# Patient Record
Sex: MALE | Race: WHITE | NOT HISPANIC OR LATINO | Employment: UNEMPLOYED | URBAN - METROPOLITAN AREA
[De-identification: names, ages, dates, MRNs, and addresses within clinical notes are randomized per-mention and may not be internally consistent; named-entity substitution may affect disease eponyms.]

---

## 2021-03-24 ENCOUNTER — OFFICE VISIT (OUTPATIENT)
Dept: URGENT CARE | Facility: CLINIC | Age: 46
End: 2021-03-24
Payer: MEDICAID

## 2021-03-24 VITALS
BODY MASS INDEX: 31.83 KG/M2 | OXYGEN SATURATION: 96 % | RESPIRATION RATE: 16 BRPM | SYSTOLIC BLOOD PRESSURE: 125 MMHG | HEART RATE: 106 BPM | DIASTOLIC BLOOD PRESSURE: 95 MMHG | HEIGHT: 72 IN | WEIGHT: 235 LBS | TEMPERATURE: 98 F

## 2021-03-24 DIAGNOSIS — M25.571 ACUTE RIGHT ANKLE PAIN: Primary | ICD-10-CM

## 2021-03-24 DIAGNOSIS — R09.81 SINUS CONGESTION: ICD-10-CM

## 2021-03-24 PROCEDURE — 99204 OFFICE O/P NEW MOD 45 MIN: CPT | Mod: S$GLB,,, | Performed by: FAMILY MEDICINE

## 2021-03-24 PROCEDURE — 73610 X-RAY EXAM OF ANKLE: CPT | Mod: RT,S$GLB,, | Performed by: RADIOLOGY

## 2021-03-24 PROCEDURE — 73610 XR ANKLE COMPLETE 3 VIEW RIGHT: ICD-10-PCS | Mod: RT,S$GLB,, | Performed by: RADIOLOGY

## 2021-03-24 PROCEDURE — 99204 PR OFFICE/OUTPT VISIT, NEW, LEVL IV, 45-59 MIN: ICD-10-PCS | Mod: S$GLB,,, | Performed by: FAMILY MEDICINE

## 2021-03-24 RX ORDER — PREGABALIN 100 MG/1
100 CAPSULE ORAL 3 TIMES DAILY
Status: ON HOLD | COMMUNITY
End: 2022-10-31 | Stop reason: HOSPADM

## 2021-03-24 RX ORDER — KETOROLAC TROMETHAMINE 30 MG/ML
30 INJECTION, SOLUTION INTRAMUSCULAR; INTRAVENOUS
Status: COMPLETED | OUTPATIENT
Start: 2021-03-24 | End: 2021-03-24

## 2021-03-24 RX ORDER — DEXAMETHASONE SODIUM PHOSPHATE 100 MG/10ML
10 INJECTION INTRAMUSCULAR; INTRAVENOUS
Status: COMPLETED | OUTPATIENT
Start: 2021-03-24 | End: 2021-03-24

## 2021-03-24 RX ORDER — FLUTICASONE PROPIONATE 50 MCG
1 SPRAY, SUSPENSION (ML) NASAL 2 TIMES DAILY
Qty: 16 G | Refills: 1 | Status: SHIPPED | OUTPATIENT
Start: 2021-03-24

## 2021-03-24 RX ADMIN — KETOROLAC TROMETHAMINE 30 MG: 30 INJECTION, SOLUTION INTRAMUSCULAR; INTRAVENOUS at 01:03

## 2021-03-24 RX ADMIN — DEXAMETHASONE SODIUM PHOSPHATE 10 MG: 100 INJECTION INTRAMUSCULAR; INTRAVENOUS at 01:03

## 2022-10-15 PROBLEM — D64.9 SYMPTOMATIC ANEMIA: Status: ACTIVE | Noted: 2022-10-15

## 2022-10-15 PROBLEM — E11.9 TYPE 2 DIABETES MELLITUS, WITHOUT LONG-TERM CURRENT USE OF INSULIN: Status: ACTIVE | Noted: 2022-10-15

## 2022-10-15 PROBLEM — K70.30 ALCOHOLIC CIRRHOSIS: Status: ACTIVE | Noted: 2022-10-15

## 2022-10-15 PROBLEM — D69.6 THROMBOCYTOPENIA: Status: ACTIVE | Noted: 2022-10-15

## 2022-10-15 PROBLEM — I10 BENIGN ESSENTIAL HTN: Status: ACTIVE | Noted: 2022-10-15

## 2022-10-16 PROBLEM — E11.9 TYPE 2 DIABETES MELLITUS, WITHOUT LONG-TERM CURRENT USE OF INSULIN: Status: RESOLVED | Noted: 2022-10-15 | Resolved: 2022-10-16

## 2022-10-16 PROBLEM — R53.81 DEBILITY: Status: ACTIVE | Noted: 2022-10-16

## 2022-10-18 PROBLEM — L02.413 ABSCESS OF RIGHT ARM: Status: ACTIVE | Noted: 2022-10-18

## 2022-10-18 PROBLEM — E87.1 HYPONATREMIA: Status: ACTIVE | Noted: 2022-10-18

## 2022-10-20 PROBLEM — N17.9 ARF (ACUTE RENAL FAILURE): Status: ACTIVE | Noted: 2022-10-20

## 2022-10-22 ENCOUNTER — HOSPITAL ENCOUNTER (INPATIENT)
Facility: HOSPITAL | Age: 47
LOS: 10 days | Discharge: HOME | DRG: 432 | End: 2022-11-01
Attending: HOSPITALIST | Admitting: HOSPITALIST
Payer: MEDICAID

## 2022-10-22 DIAGNOSIS — F10.21 ALCOHOL DEPENDENCE IN REMISSION: Chronic | ICD-10-CM

## 2022-10-22 DIAGNOSIS — K74.60 DECOMPENSATED HEPATIC CIRRHOSIS: Primary | ICD-10-CM

## 2022-10-22 DIAGNOSIS — G60.9 IDIOPATHIC PERIPHERAL NEUROPATHY: ICD-10-CM

## 2022-10-22 DIAGNOSIS — K72.90 DECOMPENSATED HEPATIC CIRRHOSIS: Primary | ICD-10-CM

## 2022-10-22 DIAGNOSIS — Z01.818 PRE-TRANSPLANT EVALUATION FOR LIVER TRANSPLANT: ICD-10-CM

## 2022-10-22 DIAGNOSIS — I27.20 PULMONARY HYPERTENSION: ICD-10-CM

## 2022-10-22 PROBLEM — G61.0 GUILLAIN-BARRE SYNDROME: Status: ACTIVE | Noted: 2020-09-08

## 2022-10-22 PROBLEM — I87.2 PERIPHERAL VENOUS INSUFFICIENCY: Status: ACTIVE | Noted: 2021-06-21

## 2022-10-22 PROBLEM — E87.5 HYPERKALEMIA: Status: ACTIVE | Noted: 2022-10-22

## 2022-10-22 PROBLEM — M79.2 PERIPHERAL NEUROPATHIC PAIN: Status: ACTIVE | Noted: 2019-10-28

## 2022-10-22 PROBLEM — F10.20 UNCOMPLICATED ALCOHOL DEPENDENCE: Status: ACTIVE | Noted: 2019-10-28

## 2022-10-22 PROBLEM — I82.812 THROMBOSIS OF LEFT SAPHENOUS VEIN: Status: ACTIVE | Noted: 2019-11-26

## 2022-10-22 PROBLEM — I95.1 ORTHOSTATIC HYPOTENSION: Status: ACTIVE | Noted: 2021-10-20

## 2022-10-22 PROBLEM — E53.8 VITAMIN B12 DEFICIENCY (NON ANEMIC): Status: ACTIVE | Noted: 2020-04-30

## 2022-10-22 PROBLEM — R00.0 TACHYCARDIA: Status: ACTIVE | Noted: 2021-05-03

## 2022-10-22 PROCEDURE — 84540 ASSAY OF URINE/UREA-N: CPT | Performed by: HOSPITALIST

## 2022-10-22 PROCEDURE — 83935 ASSAY OF URINE OSMOLALITY: CPT | Performed by: HOSPITALIST

## 2022-10-22 PROCEDURE — 84300 ASSAY OF URINE SODIUM: CPT | Performed by: HOSPITALIST

## 2022-10-22 PROCEDURE — 81001 URINALYSIS AUTO W/SCOPE: CPT | Performed by: HOSPITALIST

## 2022-10-22 PROCEDURE — 99223 PR INITIAL HOSPITAL CARE,LEVL III: ICD-10-PCS | Mod: ,,, | Performed by: HOSPITALIST

## 2022-10-22 PROCEDURE — 84156 ASSAY OF PROTEIN URINE: CPT | Performed by: HOSPITALIST

## 2022-10-22 PROCEDURE — 87086 URINE CULTURE/COLONY COUNT: CPT | Performed by: HOSPITALIST

## 2022-10-22 PROCEDURE — 84133 ASSAY OF URINE POTASSIUM: CPT | Performed by: HOSPITALIST

## 2022-10-22 PROCEDURE — 87205 SMEAR GRAM STAIN: CPT | Performed by: HOSPITALIST

## 2022-10-22 PROCEDURE — 20600001 HC STEP DOWN PRIVATE ROOM

## 2022-10-22 PROCEDURE — 25000003 PHARM REV CODE 250: Performed by: HOSPITALIST

## 2022-10-22 PROCEDURE — 82436 ASSAY OF URINE CHLORIDE: CPT | Performed by: HOSPITALIST

## 2022-10-22 PROCEDURE — 99223 1ST HOSP IP/OBS HIGH 75: CPT | Mod: ,,, | Performed by: HOSPITALIST

## 2022-10-22 RX ORDER — MICONAZOLE NITRATE 2 %
POWDER (GRAM) TOPICAL 2 TIMES DAILY
Status: DISCONTINUED | OUTPATIENT
Start: 2022-10-22 | End: 2022-11-01 | Stop reason: HOSPADM

## 2022-10-22 RX ORDER — LACTULOSE 10 G/15ML
10 SOLUTION ORAL 2 TIMES DAILY
Status: DISCONTINUED | OUTPATIENT
Start: 2022-10-22 | End: 2022-11-01

## 2022-10-22 RX ORDER — ONDANSETRON 2 MG/ML
4 INJECTION INTRAMUSCULAR; INTRAVENOUS EVERY 6 HOURS PRN
Status: DISCONTINUED | OUTPATIENT
Start: 2022-10-22 | End: 2022-11-01 | Stop reason: HOSPADM

## 2022-10-22 RX ORDER — MIDODRINE HYDROCHLORIDE 5 MG/1
10 TABLET ORAL EVERY 8 HOURS
Status: DISCONTINUED | OUTPATIENT
Start: 2022-10-22 | End: 2022-10-28

## 2022-10-22 RX ORDER — SODIUM BICARBONATE 650 MG/1
1300 TABLET ORAL 3 TIMES DAILY
Status: DISCONTINUED | OUTPATIENT
Start: 2022-10-22 | End: 2022-11-01 | Stop reason: HOSPADM

## 2022-10-22 RX ORDER — ACETAMINOPHEN 325 MG/1
650 TABLET ORAL EVERY 8 HOURS PRN
Status: DISCONTINUED | OUTPATIENT
Start: 2022-10-22 | End: 2022-11-01 | Stop reason: HOSPADM

## 2022-10-22 RX ORDER — FOLIC ACID 1 MG/1
1000 TABLET ORAL DAILY
Status: DISCONTINUED | OUTPATIENT
Start: 2022-10-23 | End: 2022-11-01 | Stop reason: HOSPADM

## 2022-10-22 RX ADMIN — SODIUM BICARBONATE 650 MG TABLET 1300 MG: at 09:10

## 2022-10-22 RX ADMIN — MIDODRINE HYDROCHLORIDE 10 MG: 5 TABLET ORAL at 09:10

## 2022-10-22 RX ADMIN — LACTULOSE 10 G: 20 SOLUTION ORAL at 09:10

## 2022-10-22 RX ADMIN — MICONAZOLE NITRATE 2 % TOPICAL POWDER: at 10:10

## 2022-10-22 NOTE — PROVIDER TRANSFER
Outside Transfer Acceptance Note / Regional Referral Center    Referring facility: [facility]   Referring provider: GAIL DOUGLAS  Accepting facility: [facility]  Accepting provider: BETH ALAS  Reason for transfer: Higher Level of Care   Transfer diagnosis: hepatorenal syndrome  Transfer specialty requested: Transplant Liver  Transfer specialty notified: yes  Transfer level: NUMBER 1-5: 2  Isolation status: No active isolations   Admission class or status: IP- Inpatient    Narrative     47-year-old m with PMH HTN, GBS, diabetes, presumed alcoholic cirrhosis admitted to [facility] on 10/15 with weakness and abd distension.  Patient reports that his last drink was 2 months ago.    On admission VS unremarkable.  PEx pos for scleral icterus and abd distension with no tenderness.  He was AOX3.  WBC 12.9, Hb 7.2, Plts 142, INR 1.6 > 1. Na 132, K 3.7, CO2 21, BUN 50, Cr 1.41 (BL 0.91), bilirubin 8.8, AST 68, ALT 37, HV A/B/C neg.  COVID neg.      CT abd pos for cirrhosis with moderate volume ascites.  Also, 1.4 x 1 cm nonspecific hypoattenuating lesion in near the splenic hilum.  US abdomen pos for cirrhotic liver.  Stasis flow suggestive of portal HTN.  Moderate ascites.      On 10/18 the patient was noted to have a right axillary abscess a/w rising WBC.  The abscess was drained and patient placed on clindamycin > linezolid and CTX.  Cultures NGTD.    Hospitalization a/w increasing renal failure with  Cr 1.41 (10/15) > 1.66 (10/19) > 2 (10/21) > 3.8 (10/22) following a large volume paracentesis on 10/21.  BP fell into the mid 90s briefly following the procedure. Patient was given albumin (25 g x 4 ) and placed on midodrine    Also hosppitalization a/w increasing liver failure: T bili 8.8 > > 10.2, AST 68 > > 88. MELD Na 35    Transfer requested for liver XPL evaluation.  Transfer diagnosis acute liver failure, acute renal failure with concern for hepatorenal syndrome.     Instructions       [facility] James-  Admit to Hospital Medicine  Upon patient arrival to floor, please send SecureChat to [facility] or call extension [phone] (if no answer, this will flip to a beeper, so enter your call back number) for Hospital Medicine admit team assignment and for additional admit orders for the patient.  Do not page the attending physician associated with the patient on arrival (this physician may not be on duty at the time of arrival).  Rather, always call [phone] to reach the triage physician for orders and team assignment.

## 2022-10-23 PROBLEM — K74.60 DECOMPENSATED HEPATIC CIRRHOSIS: Status: ACTIVE | Noted: 2022-10-23

## 2022-10-23 PROBLEM — F10.21 ALCOHOL DEPENDENCE IN REMISSION: Chronic | Status: ACTIVE | Noted: 2022-10-23

## 2022-10-23 PROBLEM — K72.90 DECOMPENSATED HEPATIC CIRRHOSIS: Status: ACTIVE | Noted: 2022-10-23

## 2022-10-23 PROBLEM — F10.10 ALCOHOL ABUSE: Status: ACTIVE | Noted: 2022-10-23

## 2022-10-23 LAB
AFP SERPL-MCNC: <2 NG/ML (ref 0–8.4)
ALBUMIN SERPL BCP-MCNC: 2.6 G/DL (ref 3.5–5.2)
ALP SERPL-CCNC: 171 U/L (ref 55–135)
ALT SERPL W/O P-5'-P-CCNC: 25 U/L (ref 10–44)
AMMONIA PLAS-SCNC: 54 UMOL/L (ref 10–50)
ANION GAP SERPL CALC-SCNC: 9 MMOL/L (ref 8–16)
AST SERPL-CCNC: 54 U/L (ref 10–40)
BASOPHILS # BLD AUTO: 0.1 K/UL (ref 0–0.2)
BASOPHILS NFR BLD: 0.7 % (ref 0–1.9)
BILIRUB SERPL-MCNC: 9.7 MG/DL (ref 0.1–1)
BILIRUB UR QL STRIP: ABNORMAL
BUN SERPL-MCNC: 53 MG/DL (ref 6–20)
CALCIUM SERPL-MCNC: 7.9 MG/DL (ref 8.7–10.5)
CERULOPLASMIN SERPL-MCNC: 14 MG/DL (ref 15–45)
CHLORIDE SERPL-SCNC: 99 MMOL/L (ref 95–110)
CHLORIDE UR-SCNC: <20 MMOL/L (ref 25–200)
CK SERPL-CCNC: 13 U/L (ref 20–200)
CLARITY UR REFRACT.AUTO: ABNORMAL
CO2 SERPL-SCNC: 20 MMOL/L (ref 23–29)
COLOR UR AUTO: ABNORMAL
CREAT SERPL-MCNC: 2.4 MG/DL (ref 0.5–1.4)
CREAT UR-MCNC: 163 MG/DL (ref 23–375)
CREAT UR-MCNC: 163 MG/DL (ref 23–375)
DIFFERENTIAL METHOD: ABNORMAL
EOSINOPHIL # BLD AUTO: 0.5 K/UL (ref 0–0.5)
EOSINOPHIL NFR BLD: 3.1 % (ref 0–8)
ERYTHROCYTE [DISTWIDTH] IN BLOOD BY AUTOMATED COUNT: 20.4 % (ref 11.5–14.5)
EST. GFR  (NO RACE VARIABLE): 32.3 ML/MIN/1.73 M^2
FERRITIN SERPL-MCNC: 1292 NG/ML (ref 20–300)
GLUCOSE SERPL-MCNC: 125 MG/DL (ref 70–110)
GLUCOSE UR QL STRIP: NEGATIVE
GRAM STN SPEC: NORMAL
GRAM STN SPEC: NORMAL
HCT VFR BLD AUTO: 24.8 % (ref 40–54)
HGB BLD-MCNC: 8.3 G/DL (ref 14–18)
HGB UR QL STRIP: ABNORMAL
HYALINE CASTS UR QL AUTO: 11 /LPF
IGG SERPL-MCNC: 1465 MG/DL (ref 650–1600)
IMM GRANULOCYTES # BLD AUTO: 0.07 K/UL (ref 0–0.04)
IMM GRANULOCYTES NFR BLD AUTO: 0.5 % (ref 0–0.5)
INR PPP: 1.7 (ref 0.8–1.2)
KETONES UR QL STRIP: NEGATIVE
LEUKOCYTE ESTERASE UR QL STRIP: ABNORMAL
LYMPHOCYTES # BLD AUTO: 1.7 K/UL (ref 1–4.8)
LYMPHOCYTES NFR BLD: 11.4 % (ref 18–48)
MAGNESIUM SERPL-MCNC: 1.7 MG/DL (ref 1.6–2.6)
MCH RBC QN AUTO: 35.2 PG (ref 27–31)
MCHC RBC AUTO-ENTMCNC: 33.5 G/DL (ref 32–36)
MCV RBC AUTO: 105 FL (ref 82–98)
MICROSCOPIC COMMENT: ABNORMAL
MONOCYTES # BLD AUTO: 1.5 K/UL (ref 0.3–1)
MONOCYTES NFR BLD: 10 % (ref 4–15)
NEUTROPHILS # BLD AUTO: 11 K/UL (ref 1.8–7.7)
NEUTROPHILS NFR BLD: 74.3 % (ref 38–73)
NITRITE UR QL STRIP: NEGATIVE
NRBC BLD-RTO: 0 /100 WBC
OSMOLALITY UR: 374 MOSM/KG (ref 50–1200)
PH UR STRIP: 5 [PH] (ref 5–8)
PHOSPHATE SERPL-MCNC: 4.2 MG/DL (ref 2.7–4.5)
PLATELET # BLD AUTO: 114 K/UL (ref 150–450)
PMV BLD AUTO: 13.4 FL (ref 9.2–12.9)
POTASSIUM SERPL-SCNC: 4.3 MMOL/L (ref 3.5–5.1)
POTASSIUM UR-SCNC: 48 MMOL/L (ref 15–95)
PROT SERPL-MCNC: 5.4 G/DL (ref 6–8.4)
PROT UR QL STRIP: NEGATIVE
PROT UR-MCNC: 22 MG/DL (ref 0–15)
PROT/CREAT UR: 0.13 MG/G{CREAT} (ref 0–0.2)
PROTHROMBIN TIME: 17.2 SEC (ref 9–12.5)
RBC # BLD AUTO: 2.36 M/UL (ref 4.6–6.2)
RBC #/AREA URNS AUTO: 15 /HPF (ref 0–4)
SODIUM SERPL-SCNC: 128 MMOL/L (ref 136–145)
SODIUM UR-SCNC: <10 MMOL/L (ref 20–250)
SP GR UR STRIP: 1.02 (ref 1–1.03)
SQUAMOUS #/AREA URNS AUTO: 1 /HPF
URATE SERPL-MCNC: 12.7 MG/DL (ref 3.4–7)
URN SPEC COLLECT METH UR: ABNORMAL
UUN UR-MCNC: 347 MG/DL (ref 140–1050)
VANCOMYCIN SERPL-MCNC: 18.5 UG/ML
WBC # BLD AUTO: 14.85 K/UL (ref 3.9–12.7)
WBC #/AREA URNS AUTO: 48 /HPF (ref 0–5)

## 2022-10-23 PROCEDURE — 86256 FLUORESCENT ANTIBODY TITER: CPT | Performed by: HOSPITALIST

## 2022-10-23 PROCEDURE — 86376 MICROSOMAL ANTIBODY EACH: CPT | Performed by: HOSPITALIST

## 2022-10-23 PROCEDURE — 82784 ASSAY IGA/IGD/IGG/IGM EACH: CPT | Performed by: HOSPITALIST

## 2022-10-23 PROCEDURE — 82105 ALPHA-FETOPROTEIN SERUM: CPT | Performed by: HOSPITALIST

## 2022-10-23 PROCEDURE — 82728 ASSAY OF FERRITIN: CPT | Performed by: HOSPITALIST

## 2022-10-23 PROCEDURE — 82140 ASSAY OF AMMONIA: CPT | Performed by: HOSPITALIST

## 2022-10-23 PROCEDURE — 80321 ALCOHOLS BIOMARKERS 1OR 2: CPT | Performed by: HOSPITALIST

## 2022-10-23 PROCEDURE — 80053 COMPREHEN METABOLIC PANEL: CPT | Performed by: HOSPITALIST

## 2022-10-23 PROCEDURE — 84550 ASSAY OF BLOOD/URIC ACID: CPT | Performed by: HOSPITALIST

## 2022-10-23 PROCEDURE — 82550 ASSAY OF CK (CPK): CPT | Performed by: HOSPITALIST

## 2022-10-23 PROCEDURE — 82542 COL CHROMOTOGRAPHY QUAL/QUAN: CPT | Performed by: HOSPITALIST

## 2022-10-23 PROCEDURE — 83735 ASSAY OF MAGNESIUM: CPT | Performed by: HOSPITALIST

## 2022-10-23 PROCEDURE — 99223 1ST HOSP IP/OBS HIGH 75: CPT | Mod: ,,, | Performed by: INTERNAL MEDICINE

## 2022-10-23 PROCEDURE — 84100 ASSAY OF PHOSPHORUS: CPT | Performed by: HOSPITALIST

## 2022-10-23 PROCEDURE — 63600175 PHARM REV CODE 636 W HCPCS: Mod: JB | Performed by: HOSPITALIST

## 2022-10-23 PROCEDURE — 36415 COLL VENOUS BLD VENIPUNCTURE: CPT | Performed by: HOSPITALIST

## 2022-10-23 PROCEDURE — 63600175 PHARM REV CODE 636 W HCPCS: Mod: JG | Performed by: HOSPITALIST

## 2022-10-23 PROCEDURE — 25000003 PHARM REV CODE 250: Performed by: HOSPITALIST

## 2022-10-23 PROCEDURE — 85610 PROTHROMBIN TIME: CPT | Performed by: HOSPITALIST

## 2022-10-23 PROCEDURE — 99223 PR INITIAL HOSPITAL CARE,LEVL III: ICD-10-PCS | Mod: ,,, | Performed by: INTERNAL MEDICINE

## 2022-10-23 PROCEDURE — 99222 PR INITIAL HOSPITAL CARE,LEVL II: ICD-10-PCS | Mod: ,,, | Performed by: INTERNAL MEDICINE

## 2022-10-23 PROCEDURE — 85025 COMPLETE CBC W/AUTO DIFF WBC: CPT | Performed by: HOSPITALIST

## 2022-10-23 PROCEDURE — 80202 ASSAY OF VANCOMYCIN: CPT | Performed by: HOSPITALIST

## 2022-10-23 PROCEDURE — 82390 ASSAY OF CERULOPLASMIN: CPT | Performed by: HOSPITALIST

## 2022-10-23 PROCEDURE — 86665 EPSTEIN-BARR CAPSID VCA: CPT | Mod: 59 | Performed by: HOSPITALIST

## 2022-10-23 PROCEDURE — 20600001 HC STEP DOWN PRIVATE ROOM

## 2022-10-23 PROCEDURE — 99222 1ST HOSP IP/OBS MODERATE 55: CPT | Mod: ,,, | Performed by: INTERNAL MEDICINE

## 2022-10-23 PROCEDURE — 82525 ASSAY OF COPPER: CPT | Performed by: HOSPITALIST

## 2022-10-23 PROCEDURE — P9047 ALBUMIN (HUMAN), 25%, 50ML: HCPCS | Mod: JG | Performed by: HOSPITALIST

## 2022-10-23 PROCEDURE — 86038 ANTINUCLEAR ANTIBODIES: CPT | Performed by: HOSPITALIST

## 2022-10-23 RX ORDER — PROCHLORPERAZINE EDISYLATE 5 MG/ML
5 INJECTION INTRAMUSCULAR; INTRAVENOUS EVERY 6 HOURS PRN
Status: DISCONTINUED | OUTPATIENT
Start: 2022-10-23 | End: 2022-11-01 | Stop reason: HOSPADM

## 2022-10-23 RX ORDER — ALBUMIN HUMAN 250 G/1000ML
50 SOLUTION INTRAVENOUS 2 TIMES DAILY
Status: DISPENSED | OUTPATIENT
Start: 2022-10-23 | End: 2022-10-24

## 2022-10-23 RX ORDER — THIAMINE HCL 100 MG
100 TABLET ORAL DAILY
Status: DISCONTINUED | OUTPATIENT
Start: 2022-10-23 | End: 2022-11-01 | Stop reason: HOSPADM

## 2022-10-23 RX ORDER — PREGABALIN 75 MG/1
75 CAPSULE ORAL 2 TIMES DAILY
Status: DISCONTINUED | OUTPATIENT
Start: 2022-10-23 | End: 2022-10-31

## 2022-10-23 RX ORDER — OCTREOTIDE ACETATE 50 UG/ML
100 INJECTION, SOLUTION INTRAVENOUS; SUBCUTANEOUS EVERY 8 HOURS
Status: DISCONTINUED | OUTPATIENT
Start: 2022-10-23 | End: 2022-10-26

## 2022-10-23 RX ADMIN — MIDODRINE HYDROCHLORIDE 10 MG: 5 TABLET ORAL at 05:10

## 2022-10-23 RX ADMIN — ALBUMIN (HUMAN) 50 G: 12.5 SOLUTION INTRAVENOUS at 08:10

## 2022-10-23 RX ADMIN — OCTREOTIDE ACETATE 100 MCG: 50 INJECTION, SOLUTION INTRAVENOUS; SUBCUTANEOUS at 08:10

## 2022-10-23 RX ADMIN — SODIUM BICARBONATE 650 MG TABLET 1300 MG: at 08:10

## 2022-10-23 RX ADMIN — SODIUM BICARBONATE 650 MG TABLET 1300 MG: at 02:10

## 2022-10-23 RX ADMIN — PREGABALIN 75 MG: 75 CAPSULE ORAL at 12:10

## 2022-10-23 RX ADMIN — LACTULOSE 10 G: 20 SOLUTION ORAL at 08:10

## 2022-10-23 RX ADMIN — CEFTRIAXONE SODIUM 2 G: 2 INJECTION, SOLUTION INTRAVENOUS at 01:10

## 2022-10-23 RX ADMIN — MICONAZOLE NITRATE 2 % TOPICAL POWDER: at 08:10

## 2022-10-23 RX ADMIN — Medication 100 MG: at 08:10

## 2022-10-23 RX ADMIN — PREGABALIN 75 MG: 75 CAPSULE ORAL at 08:10

## 2022-10-23 RX ADMIN — VANCOMYCIN HYDROCHLORIDE 2000 MG: 500 INJECTION, POWDER, LYOPHILIZED, FOR SOLUTION INTRAVENOUS at 02:10

## 2022-10-23 RX ADMIN — ALBUMIN (HUMAN) 50 G: 12.5 SOLUTION INTRAVENOUS at 01:10

## 2022-10-23 RX ADMIN — OCTREOTIDE ACETATE 100 MCG: 50 INJECTION, SOLUTION INTRAVENOUS; SUBCUTANEOUS at 02:10

## 2022-10-23 RX ADMIN — Medication 1 CAPSULE: at 08:10

## 2022-10-23 RX ADMIN — MIDODRINE HYDROCHLORIDE 10 MG: 5 TABLET ORAL at 02:10

## 2022-10-23 RX ADMIN — MIDODRINE HYDROCHLORIDE 10 MG: 5 TABLET ORAL at 08:10

## 2022-10-23 RX ADMIN — PHYTONADIONE 5 MG: 10 INJECTION, EMULSION INTRAMUSCULAR; INTRAVENOUS; SUBCUTANEOUS at 11:10

## 2022-10-23 RX ADMIN — FOLIC ACID 1000 MCG: 1 TABLET ORAL at 08:10

## 2022-10-23 NOTE — NURSING
Pt to US. Pt transported per stretcher. Pt refused to assist at all in transfer from bed to stretcher. Nurse, transport and PCT required to move patient.

## 2022-10-23 NOTE — H&P
[facility] Medicine  History & Physical    Patient Name: Marshall Corona  MRN: [id number]  Patient Class: IP- Inpatient  Admission Date: 10/22/2022  Attending Physician: Sandra Neal MD [facility]  Admitting Physician: Jose Quijano MD  Primary Care Provider: Primary Doctor No         Patient information was obtained from patient, past medical records and [facility] notes .     Subjective:     Principal Problem:KATHE (acute kidney injury)    Chief Complaint:   Chief Complaint   Patient presents with    decompensated cirrhosis kathe        HPI: TRANSFER CENTER  PHYSICIAN SUMMARY  47-year-old m with PMH HTN, GBS, diabetes, presumed alcoholic cirrhosis admitted to [facility] on 10/15 with weakness and abd distension.  Patient reports that his last drink was 2 months ago.     On admission VS unremarkable.  PEx pos for scleral icterus and abd distension with no tenderness.  He was AOX3.  WBC 12.9, Hb 7.2, Plts 142, INR 1.6 > 1. Na 132, K 3.7, CO2 21, BUN 50, Cr 1.41 (BL 0.91), bilirubin 8.8, AST 68, ALT 37, HV A/B/C neg.  COVID neg.       CT abd pos for cirrhosis with moderate volume ascites.  Also, 1.4 x 1 cm nonspecific hypoattenuating lesion in near the splenic hilum.  US abdomen pos for cirrhotic liver.  Stasis flow suggestive of portal HTN.  Moderate ascites.       On 10/18 the patient was noted to have a right axillary abscess a/w rising WBC.  The abscess was drained and patient placed on clindamycin > linezolid and CTX.  Cultures NGTD.     Hospitalization a/w increasing renal failure with  Cr 1.41 (10/15) > 1.66 (10/19) > 2 (10/21) > 3.8 (10/22) following a large volume paracentesis on 10/21.  BP fell into the mid 90s briefly following the procedure. Patient was given albumin (25 g x 4 ) and placed on midodrine     Also hosppitalization a/w increasing liver failure: T bili 8.8 > > 10.2, AST 68 > > 88. MELD Na 35     Transfer requested for liver XPL evaluation.   "Transfer diagnosis acute liver failure, acute renal failure with concern for hepatorenal syndrome.     BEDSIDE EVAL    At bedside patient reports he feels ok.  Before paracentesis on 10/21, he's had one prior at a another hospital earlier this month where 4L was removed.  He feels after recent paracentesis he has increased abdominal swelling and pain, feels fluid has reaccumulated faster or he is more bloated.  Reports last alcohol use was a few months ago, states he quit prior to diagnosis of liver disease.     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   \  He uses smokeless tobacco Gallion Dip.  He denies any hx of Intra-nasal or injection illicit drug use.       Past Medical History:   Diagnosis Date    Alcohol abuse 10/23/2022    Allergy     Guillain-[place] syndrome     Hypertension     Peripheral venous insufficiency 6/21/2021    Pulmonary hypertension 8/3/2021    Thrombosis of left saphenous vein 11/26/2019    Uncomplicated alcohol dependence 10/28/2019    Vitamin B12 deficiency (non anemic) 4/30/2020       Past Surgical History:   Procedure Laterality Date    ARTHROSCOPY OF KNEE  2001    Right knee scope    EYE SURGERY      bilateral lasic    INCISION AND DRAINAGE OF ABSCESS Right 10/18/2022    Procedure: INCISION AND DRAINAGE, ABSCESS-AXILLA;  Surgeon: Geovanni Lee MD;  Location: [facility];  Service: General;  Laterality: Right;       Review of patient's allergies indicates:   Allergen Reactions    Codeine Itching, Anaphylaxis and Hives    Hydrocodone-acetaminophen      "Agitation"    Influenza virus vaccines Other (See Comments)    Oxycodone-acetaminophen      "Agitation"       Current Facility-Administered Medications on File Prior to " Encounter   Medication    [DISCONTINUED] 0.9%  NaCl infusion (for blood administration)    [DISCONTINUED] acetaminophen tablet 650 mg    [DISCONTINUED] acetaminophen tablet 650 mg    [DISCONTINUED] cefTRIAXone (ROCEPHIN) 2 g/50 mL D5W IVPB    [DISCONTINUED] dextrose 50% injection 12.5 g    [DISCONTINUED] dextrose 50% injection 25 g    [DISCONTINUED] dextrose oral liquid/gel 15 g    [DISCONTINUED] dextrose oral liquid/gel 15 g    [DISCONTINUED] dextrose oral liquid/gel 30 g    [DISCONTINUED] dextrose oral liquid/gel 30 g    [DISCONTINUED] folic acid tablet 1,000 mcg    [DISCONTINUED] glucagon (human recombinant) injection 1 mg    [DISCONTINUED] Lactobacillus rhamnosus GG capsule 1 capsule    [DISCONTINUED] lactulose 20 gram/30 mL solution Soln 10 g    [DISCONTINUED] linezolid 600 mg/300 mL IVPB 600 mg    [DISCONTINUED] miconazole 2 % ointment    [DISCONTINUED] miconazole NITRATE 2 % top powder    [DISCONTINUED] midodrine tablet 10 mg    [DISCONTINUED] morphine injection 2 mg    [DISCONTINUED] naloxone 0.4 mg/mL injection 0.02 mg    [DISCONTINUED] ondansetron injection 4 mg    [DISCONTINUED] pantoprazole injection 40 mg    [DISCONTINUED] sodium bicarbonate tablet 1,300 mg    [DISCONTINUED] sodium bicarbonate tablet 650 mg    [DISCONTINUED] sodium chloride 0.9% flush 5 mL     Current Outpatient Medications on File Prior to Encounter   Medication Sig    amitriptyline (ELAVIL) 50 MG tablet Take 50 mg by mouth every evening.    benazepriL (LOTENSIN) 20 MG tablet Take 20 mg by mouth 2 (two) times daily.    bisoprolol (ZEBETA) 5 MG tablet Take 5 mg by mouth once daily.    fluticasone propionate (FLONASE) 50 mcg/actuation nasal spray 1 spray (50 mcg total) by Each Nostril route 2 (two) times daily.    folic acid (FOLVITE) 1 MG tablet Take 1,000 mcg by mouth once daily.    lactulose (CHRONULAC) 10 gram/15 mL solution Take 15 mLs by mouth 2 (two) times daily.    multivitamin (ONE DAILY MULTIVITAMIN) per tablet Take 1 tablet by  "mouth once daily.    nystatin (MYCOSTATIN) cream Apply 1 g topically 2 (two) times daily.    pantoprazole (PROTONIX) 40 MG tablet Take 40 mg by mouth 2 (two) times daily.    pregabalin (LYRICA) 100 MG capsule Take 100 mg by mouth 3 (three) times daily.     Family History    None       Tobacco Use    Smoking status: Never    Smokeless tobacco: Current     Types: Chew   Substance and Sexual Activity    Alcohol use: Not Currently     Comment: "quit 8wks ago" as of 10/15/2022    Drug use: Never    Sexual activity: Not on file     Review of Systems   Constitutional:  Positive for activity change and appetite change. Negative for chills and fever.   HENT:  Negative for sore throat and trouble swallowing.    Respiratory:  Positive for shortness of breath. Negative for cough.    Cardiovascular:  Positive for leg swelling.   Gastrointestinal:  Negative for nausea and vomiting.   Genitourinary:  Negative for dysuria.   Skin:  Negative for rash.   Neurological:  Negative for weakness.   Psychiatric/Behavioral:  Negative for confusion.    Objective:     Vital Signs (Most Recent):  Temp: 98 °F (36.7 °C) (10/22/22 2356)  Pulse: 98 (10/22/22 2356)  Resp: 15 (10/22/22 2356)  BP: 101/70 (10/22/22 2356)  SpO2: 99 % (10/22/22 2356) Vital Signs (24h Range):  Temp:  [97.7 °F (36.5 °C)-98 °F (36.7 °C)] 98 °F (36.7 °C)  Pulse:  [] 98  Resp:  [13-20] 15  SpO2:  [98 %-100 %] 99 %  BP: (100-144)/(63-86) 101/70     Weight: 103.5 kg (228 lb 2.8 oz)  Body mass index is 30.95 kg/m².    Physical Exam  Vitals and nursing note reviewed.   Constitutional:       General: He is not in acute distress.     Appearance: He is ill-appearing.   HENT:      Head: Normocephalic and atraumatic.      Mouth/Throat:      Pharynx: Oropharynx is clear.      Comments: Icteric palate  Eyes:      General: Scleral icterus present.      Pupils: Pupils are equal, round, and reactive to light.   Cardiovascular:      Rate and Rhythm: Normal rate and regular rhythm.    "   Heart sounds: No murmur heard.  Pulmonary:      Effort: Pulmonary effort is normal. No respiratory distress.      Breath sounds: No wheezing.      Comments: Reduced at bases  Abdominal:      General: Bowel sounds are normal. There is distension.      Palpations: There is shifting dullness and fluid wave.      Tenderness: There is generalized abdominal tenderness.      Hernia: A hernia is present. Hernia is present in the umbilical area.   Musculoskeletal:      Right lower leg: Edema present.      Left lower leg: Edema present.   Skin:     Coloration: Skin is jaundiced.   Neurological:      Mental Status: He is alert. Mental status is at baseline.      Comments: Has poor endurance keeping arms up testing asterixis - no overt flap noted         CRANIAL NERVES     CN III, IV, VI   Pupils are equal, round, and reactive to light.     Significant Labs: All pertinent labs within the past 24 hours have been reviewed.  CBC:   Recent Labs   Lab 10/21/22  0838 10/22/22  1535   WBC 18.42* 21.07*   HGB 9.0* 9.5*   HCT 26.2* 28.5*   * 149*     CMP:   Recent Labs   Lab 10/21/22  0546 10/22/22  1050 10/22/22  1317   *  133* 133*  --    K 4.7  4.7 6.1* 4.5     101 106  --    CO2 17*  17* 14*  --    *  132* 126*  --    BUN 53*  53* 58*  --    CREATININE 2.00*  2.00* 3.80*  --    CALCIUM 7.7*  7.7* 7.6*  --    PROT 6.5 6.2  --    ALBUMIN 2.5*  2.5* 2.5*  --    BILITOT 9.8* 10.2*  --    ALKPHOS 246* 189*  --    AST 81* 88*  --    ALT 35 32  --    ANIONGAP 15  15 13  --      Cardiac Markers: No results for input(s): CKMB, MYOGLOBIN, BNP, TROPISTAT in the last 48 hours.  Coagulation:   Recent Labs   Lab 10/22/22  1535   INR 1.6     Lactic Acid: No results for input(s): LACTATE in the last 48 hours.  Urine Studies:   Recent Labs   Lab 10/22/22  1649 10/22/22  2346   COLORU Yellow Odilia   APPEARANCEUA Clear Hazy*   PHUR 5.0 5.0   SPECGRAV 1.020 1.020   PROTEINUA 1+* Negative   GLUCUA Negative Negative    KETONESU Trace* Negative   BILIRUBINUA 3+* 1+*   OCCULTUA Trace* 3+*   NITRITE Positive* Negative   UROBILINOGEN 1.0  --    LEUKOCYTESUR 1+* 1+*   RBCUA 34* 15*   WBCUA 31* 48*   BACTERIA Negative  --    SQUAMEPITHEL 3 1   HYALINECASTS 50* 11*       Significant Imaging: I have reviewed all pertinent imaging results/findings within the past 24 hours.      MELD-Na score: 34 at 10/22/2022  3:35 PM  MELD score: 33 at 10/22/2022  3:35 PM  Calculated from:  Serum Creatinine: 3.80 mg/dL at 10/22/2022 10:50 AM  Serum Sodium: 133 mmol/L at 10/22/2022 10:50 AM  Total Bilirubin: 10.2 mg/dL at 10/22/2022 10:50 AM  INR(ratio): 1.6 at 10/22/2022  3:35 PM  Age: 47 years    Assessment/Plan:     * KATHE (acute kidney injury)  Patient with acute kidney injury likely due to pre-renal azotemia and vs ATN vs  possible hepatorenal syndrome KATHE is currently worsening. Labs reviewed- Renal function/electrolytes with Estimated Creatinine Clearance: 29.3 mL/min (A) (based on SCr of 3.8 mg/dL (H)). according to latest data. Monitor urine output and serial BMP and adjust therapy as needed. Avoid nephrotoxins and renally dose meds for GFR listed above.   -obtain urine electrolytes  -obtain Nephrology consult  -obtain retroperitoneal u/s   -continue patient on Midodrine   -Schedule 1gram/kg of iv albumin every 8 hours - normalize serum albumin and eval for improvement in creatinine  -consider up to 1.5l of saline infusion      Alcoholic cirrhosis  -send additional serologic w/u for liver disease etiologies  -hepatology consult in AM  -check Liver Doppler ultrasound   -sodium and fluid restriction   -May need another diagnostic vs therapeutic paracentesis soon.  Review of [facility] labs do not see that any Ascitic fluid was send for Cell Ct Diff or studies.   -Check PETH      Symptomatic anemia  Resolved, Required PRBC transfusion prior to transfer.   -trend daily CBC  -no active GI bleeding.       Abscess of right arm  S/p I &D at outside  hospital  -patient was on Linezolid,  Ceftriaxone added today     -switch to vancomycin IV for now - wound cultures pending, does not appear gram stain sent.       Hyponatremia  Secondary to underlying cirrhosis   -holding diuretics  -Oral fluid restriction         Benign essential HTN  Holding any anti-hypertensives now      Debility  Hx of Guillain [place]re syndrome - reports not fully recovered, left with chronic debility   -PT/OT consults.       Alcohol dependence in remission  Check PETH  -reported last drink was few months ago  -continue MVI & Folic acid, add thiamine supplementation        VTE Risk Mitigation (From admission, onward)           Ordered     Place sequential compression device  Until discontinued         10/22/22 2124                       Jose Quijano MD  Department of Hospital Medicine   [facility] - Transplant Stepdown

## 2022-10-23 NOTE — PROGRESS NOTES
"Pharmacokinetic Initial Assessment: IV Vancomycin    Assessment/Plan:    Patient with KATHE, unclear baseline scr. Nephrology consult ordered. Will pulse dose at this time.     Initiate intravenous vancomycin with loading dose of 2000 mg once with subsequent doses when random concentrations are less than 20 mcg/mL  Desired empiric serum trough concentration is 10 to 15 mcg/mL  Draw vancomycin trough level on 10/23 at approximately 1400 (~12 hr post dose).  Pharmacy will continue to follow and monitor vancomycin.      Please contact pharmacy at extension [phone] with any questions regarding this assessment.     Thank you for the consult,   Josefa Park       Patient brief summary:  Marshall Corona is a 47 y.o. male initiated on antimicrobial therapy with IV Vancomycin for treatment of suspected skin & soft tissue infection    Drug Allergies:   Review of patient's allergies indicates:   Allergen Reactions    Codeine Itching, Anaphylaxis and Hives    Hydrocodone-acetaminophen      "Agitation"    Influenza virus vaccines Other (See Comments)    Oxycodone-acetaminophen      "Agitation"       Actual Body Weight:   103.5 kg    Renal Function:   Estimated Creatinine Clearance: 29.3 mL/min (A) (based on SCr of 3.8 mg/dL (H)).,     Dialysis Method (if applicable):  N/A    CBC (last 72 hours):  Recent Labs   Lab Result Units 10/20/22  1046 10/21/22  0838 10/22/22  1535   WBC K/uL 17.48* 18.42* 21.07*   Hemoglobin g/dL 8.5* 9.0* 9.5*   Hematocrit % 25.3* 26.2* 28.5*   Platelets K/uL 172 144* 149*   Gran % % 80.1* 76.5* 80.1*   Lymph % % 7.7* 9.2* 8.1*   Mono % % 8.5 9.2 8.5   Eosinophil % % 2.6 3.6 2.4   Basophil % % 0.5 0.6 0.4   Differential Method  Automated Automated Automated       Metabolic Panel (last 72 hours):  Recent Labs   Lab Result Units 10/20/22  1046 10/20/22  1923 10/21/22  0546 10/22/22  1050 10/22/22  1317 10/22/22  1649 10/22/22  2346   Sodium mmol/L 133*  --  133*  133* 133*  --   --   --    Sodium, Urine " mmol/L  --  <5*  --   --   --  <5*  --    Potassium mmol/L 4.6  --  4.7  4.7 6.1* 4.5  --   --    Potassium, Urine mmol/L  --  39  --   --   --   --   --    Chloride mmol/L 101  --  101  101 106  --   --   --    CO2 mmol/L 17*  --  17*  17* 14*  --   --   --    Glucose mg/dL 144*  --  132*  132* 126*  --   --   --    Glucose, UA   --   --   --   --   --  Negative Negative   BUN mg/dL 53*  --  53*  53* 58*  --   --   --    Creatinine mg/dL 1.93*  --  2.00*  2.00* 3.80*  --   --   --    Creatinine, Urine mg/dL  --  138.2  138.2  138.2  --   --   --   --   --    Albumin g/dL 2.5*  --  2.5*  2.5* 2.5*  --   --   --    Total Bilirubin mg/dL 10.2*  --  9.8* 10.2*  --   --   --    Alkaline Phosphatase U/L 241*  --  246* 189*  --   --   --    AST U/L 76*  --  81* 88*  --   --   --    ALT U/L 38  --  35 32  --   --   --    Phosphorus mg/dL  --   --  4.6*  --   --   --   --        Drug levels (last 3 results):  No results for input(s): VANCOMYCINRA, VANCORANDOM, VANCOMYCINPE, VANCOPEAK, VANCOMYCINTR, VANCOTROUGH in the last 72 hours.    Microbiologic Results:  Microbiology Results (last 7 days)       Procedure Component Value Units Date/Time    Gram stain [[id number]]     Order Status: Completed Specimen: Urine     Urine culture [[id number]] Collected: 10/22/22 2346    Order Status: No result Specimen: Urine Updated: 10/23/22 0035

## 2022-10-23 NOTE — SUBJECTIVE & OBJECTIVE
"Past Medical History:   Diagnosis Date    Alcohol abuse 10/23/2022    Allergy     Guillain-[place] syndrome     Hypertension     Peripheral venous insufficiency 6/21/2021    Pulmonary hypertension 8/3/2021    Thrombosis of left saphenous vein 11/26/2019    Uncomplicated alcohol dependence 10/28/2019    Vitamin B12 deficiency (non anemic) 4/30/2020       Past Surgical History:   Procedure Laterality Date    ARTHROSCOPY OF KNEE  2001    Right knee scope    EYE SURGERY      bilateral lasic    INCISION AND DRAINAGE OF ABSCESS Right 10/18/2022    Procedure: INCISION AND DRAINAGE, ABSCESS-AXILLA;  Surgeon: Geovanni Lee MD;  Location: [facility];  Service: General;  Laterality: Right;       Review of patient's allergies indicates:   Allergen Reactions    Codeine Itching, Anaphylaxis and Hives    Hydrocodone-acetaminophen      "Agitation"    Influenza virus vaccines Other (See Comments)    Oxycodone-acetaminophen      "Agitation"     Current Facility-Administered Medications   Medication Frequency    acetaminophen tablet 650 mg Q8H PRN    albumin human 25% bottle 50 g BID    cefTRIAXone (ROCEPHIN) 2 g/50 mL D5W IVPB Q24H    dextrose 10% bolus 125 mL PRN    dextrose 10% bolus 250 mL PRN    folic acid tablet 1,000 mcg Daily    Lactobacillus rhamnosus GG capsule 1 capsule Daily    lactulose 20 gram/30 mL solution Soln 10 g BID    miconazole NITRATE 2 % top powder BID    miconazole nitrate 2% ointment PRN    midodrine tablet 10 mg Q8H    ondansetron injection 4 mg Q6H PRN    phytonadione vitamin k (AQUA-MEPHYTON) 5 mg in dextrose 5 % 50 mL IVPB Daily    prochlorperazine injection Soln 5 mg Q6H PRN    sodium bicarbonate tablet 1,300 mg TID    thiamine tablet 100 mg Daily    vancomycin - pharmacy to dose pharmacy to manage frequency     Family History    None       Tobacco Use    Smoking status: Never    Smokeless tobacco: Current     Types: Chew   Substance and Sexual Activity    Alcohol use: Not Currently     Comment: "quit " "8wks ago" as of 10/15/2022    Drug use: Never    Sexual activity: Not on file     Review of Systems   Constitutional:  Positive for fatigue. Negative for appetite change, chills and fever.   HENT:  Negative for congestion, sore throat and trouble swallowing.    Eyes:  Negative for photophobia, pain and discharge.   Respiratory:  Negative for cough and shortness of breath.    Cardiovascular:  Negative for chest pain and palpitations.   Gastrointestinal:  Positive for abdominal distention. Negative for abdominal pain, diarrhea, nausea and vomiting.   Genitourinary:  Negative for difficulty urinating.   Musculoskeletal:  Negative for back pain, myalgias and neck pain.   Skin:  Positive for color change. Negative for pallor and rash.   Neurological:  Negative for light-headedness, numbness and headaches.   Objective:     Vital Signs (Most Recent):  Temp: 97.6 °F (36.4 °C) (10/23/22 0706)  Pulse: 101 (10/23/22 0706)  Resp: 16 (10/23/22 0706)  BP: 133/68 (10/23/22 0706)  SpO2: 98 % (10/23/22 0706)  O2 Device (Oxygen Therapy): room air (10/23/22 0706) Vital Signs (24h Range):  Temp:  [97.6 °F (36.4 °C)-98.1 °F (36.7 °C)] 97.6 °F (36.4 °C)  Pulse:  [] 101  Resp:  [13-18] 16  SpO2:  [98 %-100 %] 98 %  BP: (100-144)/(63-86) 133/68     Weight: 103.5 kg (228 lb 2.8 oz) (10/22/22 2053)  Body mass index is 30.95 kg/m².  Body surface area is 2.29 meters squared.    I/O last 3 completed shifts:  In: 200 [P.O.:200]  Out: 125 [Urine:125]    Physical Exam  Constitutional:       Appearance: Normal appearance.   Eyes:      General: Scleral icterus present.      Conjunctiva/sclera: Conjunctivae normal.   Cardiovascular:      Rate and Rhythm: Normal rate and regular rhythm.      Pulses: Normal pulses.      Heart sounds: Normal heart sounds.   Pulmonary:      Effort: Pulmonary effort is normal. No respiratory distress.      Breath sounds: Normal breath sounds.   Abdominal:      General: Bowel sounds are normal. There is distension. "      Palpations: Abdomen is soft.      Tenderness: There is no abdominal tenderness.   Musculoskeletal:      Right lower leg: No edema.      Left lower leg: No edema.   Skin:     General: Skin is warm and dry.      Coloration: Skin is jaundiced.      Findings: No bruising.   Neurological:      Mental Status: He is alert and oriented to person, place, and time.       Significant Labs:  All labs within the past 24 hours have been reviewed.    Significant Imaging:  Labs: Reviewed

## 2022-10-23 NOTE — CONSULTS
[facility] - Transplant Stepdown  Hepatology  Consult Note    Patient Name: Marshall Corona  MRN: [id number]  Admission Date: 10/22/2022  Hospital Length of Stay: 1 days  Attending Provider: Sandra Neal MD   Primary Care Physician: Primary Doctor No  Principal Problem:KATHE (acute kidney injury)    Inpatient consult to Hepatology  Consult performed by: Reynaldo Saucedo MD  Consult ordered by: Jose Quijano MD        Subjective:     Transplant status: No    HPI:  Mr. Marshall Corona is a 47 year old male for whom hepatology is consulted for management of decompensated cirrhosis. 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     Patient reports developing progressive abdominal distension and bilateral lower extremity swelling in approximately 07/2022 associated with friends/family noticing skin and eye yellowing. He reports symptom association with same duration of progressive generalized weakness.     He reports being admitted at [facility] from 10/08/2022 to 10/12/2022 for evaluation of symptoms; records for this hospitalization not available for review. He reports being told he was anemic and given 2U PRBC with improvement in weakness. He also reports undergoing an EGD, but does not recall results. He reports abdominal distension and skin/eye yellowing was not addressed at this admission. Following discharge, his weakness again worsened and he then presented to [facility] on 10/15.     Hospital Course: On arrival to [facility] on 10/15/2022, his physical exam was notable for evidence of jaundice/scleral icterus with abdominal distension and swelling of his right axilla. His presentation was notable for labs showing leukocytosis (12), macrocytic anemia (Hgb  "7.2), elevated INR (1.6), hyponatremia (132), KATHE (Cr 1.4), hyperbilirubinemia (8.8), and transaminitis (AST 68). Hepatitis panel negative. CT A/P showed cirrhosis with moderate volume ascites and a 1.4 X 1 cm splenic lesion. He was admitted to hospital medicine and given 2U PRBC. He was noted to have a rising leukocytosis and general surgery consulted on 10/18 with concern for abscess of right axilla; he underwent I&D on 10/18 and was treated with Clindamycin and Linezolid. He was noted to develop a worsening KATHE by 10/20 and nephrology consulted; he was initiated on Midodrine for suspected HRS. He underwent paracentesis on 10/21 with 7L of fluid removed (not analyzed) with worsening KATHE afterwards despite receiving Albumin after procedure. He was also noted to have a rising MELD score by 10/21 (35). Patient was then transferred to [facility] on 10/22 with concern for needing liver transplant evaluation.     On initial bedside interview, patient reports continued weakness and abdominal distension. He denies melena or hematochezia. He denies prior known history of liver disease, prior episodes of hepatitis, family history of liver disease, fevers, chills, and history of IVDU or tobacco usage. He reports daily consumption of ETOH since approximately age 16 that increased in frequency after being diagnosed with GBS in 2020 due to being stuck inside his home from subsequent debility. He is unable to quantify the exact amount he drinks daily; he reports drinking "until I am done". His last drink was in approximately 08/2022. He reports one prior DUI >20 years ago with subsequent enrollment in [employer] and anger management, however with continued drinking. 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 He is  and has on son. He requires a walker for assistance with ambulation and is currently looking for a new home due to living in a second floor walk-up and having difficulty " "using stairs due to lower extremity weakness.       Review of Systems   Constitutional:  Positive for fatigue. Negative for appetite change, chills and fever.   HENT:  Negative for congestion, sore throat and trouble swallowing.    Eyes:  Negative for photophobia, pain and discharge.   Respiratory:  Negative for cough and shortness of breath.    Cardiovascular:  Negative for chest pain and palpitations.   Gastrointestinal:  Positive for abdominal distention. Negative for abdominal pain, diarrhea, nausea and vomiting.   Genitourinary:  Negative for difficulty urinating.   Musculoskeletal:  Negative for back pain, myalgias and neck pain.   Skin:  Positive for color change. Negative for pallor and rash.   Neurological:  Positive for weakness. Negative for light-headedness, numbness and headaches.     Past Medical History:   Diagnosis Date    Alcohol abuse 10/23/2022    Allergy     Guillain-Umbarger syndrome     Hypertension     Peripheral venous insufficiency 6/21/2021    Pulmonary hypertension 8/3/2021    Thrombosis of left saphenous vein 11/26/2019    Uncomplicated alcohol dependence 10/28/2019    Vitamin B12 deficiency (non anemic) 4/30/2020       Past Surgical History:   Procedure Laterality Date    ARTHROSCOPY OF KNEE  2001    Right knee scope    EYE SURGERY      bilateral lasic    INCISION AND DRAINAGE OF ABSCESS Right 10/18/2022    Procedure: INCISION AND DRAINAGE, ABSCESS-AXILLA;  Surgeon: Geovanni Lee MD;  Location: [facility] OR;  Service: General;  Laterality: Right;     Review of patient's allergies indicates:   Allergen Reactions    Codeine Itching, Anaphylaxis and Hives    Hydrocodone-acetaminophen      "Agitation"    Influenza virus vaccines Other (See Comments)    Oxycodone-acetaminophen      "Agitation"         Tobacco Use    Smoking status: Never    Smokeless tobacco: Current     Types: Chew   Substance and Sexual Activity    Alcohol use: Not Currently     Comment: "quit 8wks ago" " as of 10/15/2022    Drug use: Never    Sexual activity: Not on file       Medications Prior to Admission   Medication Sig Dispense Refill Last Dose    amitriptyline (ELAVIL) 50 MG tablet Take 50 mg by mouth every evening.       benazepriL (LOTENSIN) 20 MG tablet Take 20 mg by mouth 2 (two) times daily.       bisoprolol (ZEBETA) 5 MG tablet Take 5 mg by mouth once daily.       fluticasone propionate (FLONASE) 50 mcg/actuation nasal spray 1 spray (50 mcg total) by Each Nostril route 2 (two) times daily. 16 g 1     folic acid (FOLVITE) 1 MG tablet Take 1,000 mcg by mouth once daily.       lactulose (CHRONULAC) 10 gram/15 mL solution Take 15 mLs by mouth 2 (two) times daily.       multivitamin (ONE DAILY MULTIVITAMIN) per tablet Take 1 tablet by mouth once daily.       nystatin (MYCOSTATIN) cream Apply 1 g topically 2 (two) times daily.       pantoprazole (PROTONIX) 40 MG tablet Take 40 mg by mouth 2 (two) times daily.       pregabalin (LYRICA) 100 MG capsule Take 100 mg by mouth 3 (three) times daily.          Objective:     Vital Signs (Most Recent):  Temp: 98.1 °F (36.7 °C) (10/23/22 0500)  Pulse: 99 (10/23/22 0500)  Resp: 16 (10/23/22 0500)  BP: 123/76 (10/23/22 0500)  SpO2: 99 % (10/23/22 0500) Vital Signs (24h Range):  Temp:  [97.7 °F (36.5 °C)-98.1 °F (36.7 °C)] 98.1 °F (36.7 °C)  Pulse:  [] 99  Resp:  [13-18] 16  SpO2:  [98 %-100 %] 99 %  BP: (100-144)/(63-86) 123/76     Weight: 103.5 kg (228 lb 2.8 oz) (10/22/22 2053)  Body mass index is 30.95 kg/m².    Physical Exam  Constitutional:       Appearance: Normal appearance.   Eyes:      General: Scleral icterus present.      Conjunctiva/sclera: Conjunctivae normal.   Cardiovascular:      Rate and Rhythm: Normal rate and regular rhythm.      Pulses: Normal pulses.      Heart sounds: Normal heart sounds.   Pulmonary:      Effort: Pulmonary effort is normal. No respiratory distress.      Breath sounds: Normal breath sounds.   Abdominal:      General:  Bowel sounds are normal. There is distension.      Palpations: Abdomen is soft.      Tenderness: There is no abdominal tenderness.   Musculoskeletal:      Right lower leg: No edema.      Left lower leg: No edema.   Skin:     General: Skin is warm and dry.      Coloration: Skin is jaundiced.      Findings: No bruising or rash.   Neurological:      Mental Status: He is alert and oriented to person, place, and time.       MELD-Na score: 34 at 10/22/2022  3:35 PM  MELD score: 33 at 10/22/2022  3:35 PM  Calculated from:  Serum Creatinine: 3.80 mg/dL at 10/22/2022 10:50 AM  Serum Sodium: 133 mmol/L at 10/22/2022 10:50 AM  Total Bilirubin: 10.2 mg/dL at 10/22/2022 10:50 AM  INR(ratio): 1.6 at 10/22/2022  3:35 PM  Age: 47 years    Significant Labs:  Labs within the past month have been reviewed.    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 for management of newly diagnosed decompensated cirrhosis associated with significant ascites, abscess of right axilla (status post I&D on 10/18) and KATHE with concern for needing liver transplant evaluation. Presentation here notable for evidence of ascites, improving KATHE, and MELD >30.     Recommendations:     -Serological work-up for other etiologies of liver disease other than ETOH (ordered).   -Follow-up PETH.    -Obtain repeat diagnostic and therapeutic paracentesis and send fluid studies to rule out SBP (culture, gram stain, cell count, albumin, total protein, LDH, and amylase).   -Obtain ECHO.    -Evaluation by addiction psychiatry.   -Follow-up nephrology recommendations for KATHE management.  -Midodrine TID.  -Lactulose TID and Rifaximin BID for encephalopathy management; titrate Lactulose to 3 bowel movements daily.   -PT/OT to assess functional status.   -CMP and INR daily.   -Avoid use of medications that may precipitate encephalopathy (e.g. opioids and benzo's).   -Given high MELD score, patient warrants consideration for transplant. However will hold off on initiation of transplant evaluation until further work-up of liver disease, evaluation by addiction psychiatry, PT/OT assessment, and evaluation by transplant .           Thank you for your consult. I will follow-up with patient. Please contact us if you have any additional questions.    Reynaldo Saucedo MD  Hepatology  [facility] - Transplant Stepdown

## 2022-10-23 NOTE — SUBJECTIVE & OBJECTIVE
"Past Medical History:   Diagnosis Date    Alcohol abuse 10/23/2022    Allergy     Guillain-Morris syndrome     Hypertension     Peripheral venous insufficiency 6/21/2021    Pulmonary hypertension 8/3/2021    Thrombosis of left saphenous vein 11/26/2019    Uncomplicated alcohol dependence 10/28/2019    Vitamin B12 deficiency (non anemic) 4/30/2020       Past Surgical History:   Procedure Laterality Date    ARTHROSCOPY OF KNEE  2001    Right knee scope    EYE SURGERY      bilateral lasic    INCISION AND DRAINAGE OF ABSCESS Right 10/18/2022    Procedure: INCISION AND DRAINAGE, ABSCESS-AXILLA;  Surgeon: Geovanni Lee MD;  Location: [facility];  Service: General;  Laterality: Right;       Review of patient's allergies indicates:   Allergen Reactions    Codeine Itching, Anaphylaxis and Hives    Hydrocodone-acetaminophen      "Agitation"    Influenza virus vaccines Other (See Comments)    Oxycodone-acetaminophen      "Agitation"       Current Facility-Administered Medications on File Prior to Encounter   Medication    [DISCONTINUED] 0.9%  NaCl infusion (for blood administration)    [DISCONTINUED] acetaminophen tablet 650 mg    [DISCONTINUED] acetaminophen tablet 650 mg    [DISCONTINUED] cefTRIAXone (ROCEPHIN) 2 g/50 mL D5W IVPB    [DISCONTINUED] dextrose 50% injection 12.5 g    [DISCONTINUED] dextrose 50% injection 25 g    [DISCONTINUED] dextrose oral liquid/gel 15 g    [DISCONTINUED] dextrose oral liquid/gel 15 g    [DISCONTINUED] dextrose oral liquid/gel 30 g    [DISCONTINUED] dextrose oral liquid/gel 30 g    [DISCONTINUED] folic acid tablet 1,000 mcg    [DISCONTINUED] glucagon (human recombinant) injection 1 mg    [DISCONTINUED] Lactobacillus rhamnosus GG capsule 1 capsule    [DISCONTINUED] lactulose 20 gram/30 mL solution Soln 10 g    [DISCONTINUED] linezolid 600 mg/300 mL IVPB 600 mg    [DISCONTINUED] miconazole 2 % ointment    [DISCONTINUED] miconazole NITRATE 2 % top powder    [DISCONTINUED] midodrine tablet 10 " "mg    [DISCONTINUED] morphine injection 2 mg    [DISCONTINUED] naloxone 0.4 mg/mL injection 0.02 mg    [DISCONTINUED] ondansetron injection 4 mg    [DISCONTINUED] pantoprazole injection 40 mg    [DISCONTINUED] sodium bicarbonate tablet 1,300 mg    [DISCONTINUED] sodium bicarbonate tablet 650 mg    [DISCONTINUED] sodium chloride 0.9% flush 5 mL     Current Outpatient Medications on File Prior to Encounter   Medication Sig    amitriptyline (ELAVIL) 50 MG tablet Take 50 mg by mouth every evening.    benazepriL (LOTENSIN) 20 MG tablet Take 20 mg by mouth 2 (two) times daily.    bisoprolol (ZEBETA) 5 MG tablet Take 5 mg by mouth once daily.    fluticasone propionate (FLONASE) 50 mcg/actuation nasal spray 1 spray (50 mcg total) by Each Nostril route 2 (two) times daily.    folic acid (FOLVITE) 1 MG tablet Take 1,000 mcg by mouth once daily.    lactulose (CHRONULAC) 10 gram/15 mL solution Take 15 mLs by mouth 2 (two) times daily.    multivitamin (ONE DAILY MULTIVITAMIN) per tablet Take 1 tablet by mouth once daily.    nystatin (MYCOSTATIN) cream Apply 1 g topically 2 (two) times daily.    pantoprazole (PROTONIX) 40 MG tablet Take 40 mg by mouth 2 (two) times daily.    pregabalin (LYRICA) 100 MG capsule Take 100 mg by mouth 3 (three) times daily.     Family History    None       Tobacco Use    Smoking status: Never    Smokeless tobacco: Current     Types: Chew   Substance and Sexual Activity    Alcohol use: Not Currently     Comment: "quit 8wks ago" as of 10/15/2022    Drug use: Never    Sexual activity: Not on file     Review of Systems   Constitutional:  Positive for activity change and appetite change. Negative for chills and fever.   HENT:  Negative for sore throat and trouble swallowing.    Respiratory:  Positive for shortness of breath. Negative for cough.    Cardiovascular:  Positive for leg swelling.   Gastrointestinal:  Negative for nausea and vomiting.   Genitourinary:  Negative for dysuria.   Skin:  Negative for " rash.   Neurological:  Negative for weakness.   Psychiatric/Behavioral:  Negative for confusion.    Objective:     Vital Signs (Most Recent):  Temp: 98 °F (36.7 °C) (10/22/22 2356)  Pulse: 98 (10/22/22 2356)  Resp: 15 (10/22/22 2356)  BP: 101/70 (10/22/22 2356)  SpO2: 99 % (10/22/22 2356) Vital Signs (24h Range):  Temp:  [97.7 °F (36.5 °C)-98 °F (36.7 °C)] 98 °F (36.7 °C)  Pulse:  [] 98  Resp:  [13-20] 15  SpO2:  [98 %-100 %] 99 %  BP: (100-144)/(63-86) 101/70     Weight: 103.5 kg (228 lb 2.8 oz)  Body mass index is 30.95 kg/m².    Physical Exam  Vitals and nursing note reviewed.   Constitutional:       General: He is not in acute distress.     Appearance: He is ill-appearing.   HENT:      Head: Normocephalic and atraumatic.      Mouth/Throat:      Pharynx: Oropharynx is clear.      Comments: Icteric palate  Eyes:      General: Scleral icterus present.      Pupils: Pupils are equal, round, and reactive to light.   Cardiovascular:      Rate and Rhythm: Normal rate and regular rhythm.      Heart sounds: No murmur heard.  Pulmonary:      Effort: Pulmonary effort is normal. No respiratory distress.      Breath sounds: No wheezing.      Comments: Reduced at bases  Abdominal:      General: Bowel sounds are normal. There is distension.      Palpations: There is shifting dullness and fluid wave.      Tenderness: There is generalized abdominal tenderness.      Hernia: A hernia is present. Hernia is present in the umbilical area.   Musculoskeletal:      Right lower leg: Edema present.      Left lower leg: Edema present.   Skin:     Coloration: Skin is jaundiced.   Neurological:      Mental Status: He is alert. Mental status is at baseline.      Comments: Has poor endurance keeping arms up testing asterixis - no overt flap noted         CRANIAL NERVES     CN III, IV, VI   Pupils are equal, round, and reactive to light.     Significant Labs: All pertinent labs within the past 24 hours have been reviewed.  CBC:   Recent  Labs   Lab 10/21/22  0838 10/22/22  1535   WBC 18.42* 21.07*   HGB 9.0* 9.5*   HCT 26.2* 28.5*   * 149*     CMP:   Recent Labs   Lab 10/21/22  0546 10/22/22  1050 10/22/22  1317   *  133* 133*  --    K 4.7  4.7 6.1* 4.5     101 106  --    CO2 17*  17* 14*  --    *  132* 126*  --    BUN 53*  53* 58*  --    CREATININE 2.00*  2.00* 3.80*  --    CALCIUM 7.7*  7.7* 7.6*  --    PROT 6.5 6.2  --    ALBUMIN 2.5*  2.5* 2.5*  --    BILITOT 9.8* 10.2*  --    ALKPHOS 246* 189*  --    AST 81* 88*  --    ALT 35 32  --    ANIONGAP 15  15 13  --      Cardiac Markers: No results for input(s): CKMB, MYOGLOBIN, BNP, TROPISTAT in the last 48 hours.  Coagulation:   Recent Labs   Lab 10/22/22  1535   INR 1.6     Lactic Acid: No results for input(s): LACTATE in the last 48 hours.  Urine Studies:   Recent Labs   Lab 10/22/22  1649 10/22/22  2346   COLORU Yellow Odilia   APPEARANCEUA Clear Hazy*   PHUR 5.0 5.0   SPECGRAV 1.020 1.020   PROTEINUA 1+* Negative   GLUCUA Negative Negative   KETONESU Trace* Negative   BILIRUBINUA 3+* 1+*   OCCULTUA Trace* 3+*   NITRITE Positive* Negative   UROBILINOGEN 1.0  --    LEUKOCYTESUR 1+* 1+*   RBCUA 34* 15*   WBCUA 31* 48*   BACTERIA Negative  --    SQUAMEPITHEL 3 1   HYALINECASTS 50* 11*       Significant Imaging: I have reviewed all pertinent imaging results/findings within the past 24 hours.

## 2022-10-23 NOTE — CONSULTS
[facility] - Transplant Stepdown  Nephrology  Consult Note    Patient Name: Marshall Corona  MRN: [id number]  Admission Date: 10/22/2022  Hospital Length of Stay: 1 days  Attending Provider: Sandra Neal MD   Primary Care Physician: Primary Doctor No  Principal Problem:KATHE (acute kidney injury)    Inpatient consult to Nephrology  Consult performed by: Luis Alcantara MD  Consult ordered by: Jose Quijano MD        Subjective:     HPI: Marshall Corona is a 47-year-old male with hypertension, diabetes, alcoholic cirrhosis who presented to OSH with weakness and abdominal distention. Patient was subsequently transferred to [facility] for liver transplant evaluation.  During hospitalization at OSH patient noted to have right axillary abscess which was drained and he was subsequently started on clindamycin and transitioned to linezolid.  Additionally patient with worsening renal function during admission with creatinine of 1.4 on admit with subsequent worsening up to 3.8 at the time of transfer.  Outside records noting worsening renal function following paracentesis and associated hypotension for which he received albumin and was started on midodrine.  Nephrology was consulted for the management of KATHE.      Past Medical History:   Diagnosis Date    Alcohol abuse 10/23/2022    Allergy     Guillain-[place] syndrome     Hypertension     Peripheral venous insufficiency 6/21/2021    Pulmonary hypertension 8/3/2021    Thrombosis of left saphenous vein 11/26/2019    Uncomplicated alcohol dependence 10/28/2019    Vitamin B12 deficiency (non anemic) 4/30/2020       Past Surgical History:   Procedure Laterality Date    ARTHROSCOPY OF KNEE  2001    Right knee scope    EYE SURGERY      bilateral lasic    INCISION AND DRAINAGE OF ABSCESS Right 10/18/2022    Procedure: INCISION AND DRAINAGE, ABSCESS-AXILLA;  Surgeon: Geovanni Lee MD;  Location: [facility];  Service: General;  Laterality: Right;       Review of patient's allergies  "indicates:   Allergen Reactions    Codeine Itching, Anaphylaxis and Hives    Hydrocodone-acetaminophen      "Agitation"    Influenza virus vaccines Other (See Comments)    Oxycodone-acetaminophen      "Agitation"     Current Facility-Administered Medications   Medication Frequency    acetaminophen tablet 650 mg Q8H PRN    albumin human 25% bottle 50 g BID    cefTRIAXone (ROCEPHIN) 2 g/50 mL D5W IVPB Q24H    dextrose 10% bolus 125 mL PRN    dextrose 10% bolus 250 mL PRN    folic acid tablet 1,000 mcg Daily    Lactobacillus rhamnosus GG capsule 1 capsule Daily    lactulose 20 gram/30 mL solution Soln 10 g BID    miconazole NITRATE 2 % top powder BID    miconazole nitrate 2% ointment PRN    midodrine tablet 10 mg Q8H    ondansetron injection 4 mg Q6H PRN    phytonadione vitamin k (AQUA-MEPHYTON) 5 mg in dextrose 5 % 50 mL IVPB Daily    prochlorperazine injection Soln 5 mg Q6H PRN    sodium bicarbonate tablet 1,300 mg TID    thiamine tablet 100 mg Daily    vancomycin - pharmacy to dose pharmacy to manage frequency     Family History    None       Tobacco Use    Smoking status: Never    Smokeless tobacco: Current     Types: Chew   Substance and Sexual Activity    Alcohol use: Not Currently     Comment: "quit 8wks ago" as of 10/15/2022    Drug use: Never    Sexual activity: Not on file     Review of Systems   Constitutional:  Positive for fatigue. Negative for appetite change, chills and fever.   HENT:  Negative for congestion, sore throat and trouble swallowing.    Eyes:  Negative for photophobia, pain and discharge.   Respiratory:  Negative for cough and shortness of breath.    Cardiovascular:  Negative for chest pain and palpitations.   Gastrointestinal:  Positive for abdominal distention. Negative for abdominal pain, diarrhea, nausea and vomiting.   Genitourinary:  Negative for difficulty urinating.   Musculoskeletal:  Negative for back pain, myalgias and neck pain.   Skin:  Positive for color change. Negative for " pallor and rash.   Neurological:  Negative for light-headedness, numbness and headaches.   Objective:     Vital Signs (Most Recent):  Temp: 97.6 °F (36.4 °C) (10/23/22 0706)  Pulse: 101 (10/23/22 0706)  Resp: 16 (10/23/22 0706)  BP: 133/68 (10/23/22 0706)  SpO2: 98 % (10/23/22 0706)  O2 Device (Oxygen Therapy): room air (10/23/22 0706) Vital Signs (24h Range):  Temp:  [97.6 °F (36.4 °C)-98.1 °F (36.7 °C)] 97.6 °F (36.4 °C)  Pulse:  [] 101  Resp:  [13-18] 16  SpO2:  [98 %-100 %] 98 %  BP: (100-144)/(63-86) 133/68     Weight: 103.5 kg (228 lb 2.8 oz) (10/22/22 2053)  Body mass index is 30.95 kg/m².  Body surface area is 2.29 meters squared.    I/O last 3 completed shifts:  In: 200 [P.O.:200]  Out: 125 [Urine:125]    Physical Exam  Constitutional:       Appearance: Normal appearance.   Eyes:      General: Scleral icterus present.      Conjunctiva/sclera: Conjunctivae normal.   Cardiovascular:      Rate and Rhythm: Normal rate and regular rhythm.      Pulses: Normal pulses.      Heart sounds: Normal heart sounds.   Pulmonary:      Effort: Pulmonary effort is normal. No respiratory distress.      Breath sounds: Normal breath sounds.   Abdominal:      General: Bowel sounds are normal. There is distension.      Palpations: Abdomen is soft.      Tenderness: There is abdominal tenderness.   Musculoskeletal:      Right lower leg: No edema.      Left lower leg: No edema.   Skin:     General: Skin is warm and dry.      Coloration: Skin is jaundiced.      Findings: No bruising.   Neurological:      Mental Status: He is alert and oriented to person, place, and time.       Significant Labs:  All labs within the past 24 hours have been reviewed.    Significant Imaging:  Labs: Reviewed    Assessment/Plan:     * KATHE (acute kidney injury)  Patient with baseline creatinine of 1.0 which worsened to creatinine of 1.4 at the time of admission to OSH and subsequently 3.8 upon [place] [facility]  Notably, creatinine 2.4 upon repeat so  suspect 3.8 was erroneous. In the setting of cirrhosis, there was some concern for hepatorenal syndrome so patient was started on albumin and midodrine.  Urine sodium (<10) consistent with hepatorenal syndrome but patient with robust blood pressures so less likely.  Some concern for abdominal compartment syndrome but recent paracentesis. Tender belly. Bilirubin elevated.  Suspect either pre renal versus ATN v bilirubin tubulopathy.    - lower suspicion but okay to continue HRS regimen at this time  - volume expansion with albumin  - recommend repeat paracentesis for therapeutic and diagnostic  - will plan to assess urine sediment  - strict intake and output  - maintain map > 65  - renally dose medications and avoid nephrotoxins when possible      Hyponatremia  Patient with baseline hyponatremia but worsening to 128 at the time of consult.  Suspect likely related to underlying cirrhosis versus hypovolemic hyponatremia given history.     - would recommend repeating urine sodium and osmolality, serum osmolality  - daily BMP    Abscess of right arm  Status post I&D at OSH  Currently on vancomycin  - per primary    Alcoholic cirrhosis  MELD-Na score: 32 at 10/23/2022  6:19 AM  MELD score: 29 at 10/23/2022  6:19 AM  Calculated from:  Serum Creatinine: 2.4 mg/dL at 10/23/2022  6:19 AM  Serum Sodium: 128 mmol/L at 10/23/2022  6:19 AM  Total Bilirubin: 9.7 mg/dL at 10/23/2022  6:19 AM  INR(ratio): 1.7 at 10/23/2022  6:18 AM  Age: 47 years    - per primary        Thank you for your consult. I will follow-up with patient. Please contact us if you have any additional questions.    Luis Alcantara MD  Nephrology  [facility] - Transplant Stepdown

## 2022-10-23 NOTE — PROGRESS NOTES
"Pharmacokinetic Assessment Follow Up: IV Vancomycin    Vancomycin serum concentration assessment(s):    Indication: right axillary abscess s/p I&D 10/18, culture NGTD  Goal trough: 10-15  Renal function: KATHE iso suspected HRS, Scr bl 1.4, peaked at 3.8 upon admission, improving today to 2.4  The random level was drawn correctly and can be used to guide therapy at this time. The measurement is above the desired definitive target range of 10 to 15 mcg/mL.    Vancomycin Regimen Plan:    Re-dose later with 15 mg/kg x1, when the random level is less than 15 mcg/mL, likely 24 hr after the last dose on 10/23 @2:30 AM  Dose by level iso KATHE, next level to be drawn at 0200 on 10/25, if still continuing therapy    Drug levels (last 3 results):  Recent Labs   Lab Result Units 10/23/22  1454   Vancomycin, Random ug/mL 18.5       Pharmacy will continue to follow and monitor vancomycin.    Please contact pharmacy at extension [phone] for questions regarding this assessment.    Thank you for the consult,   Kitty "Dakota Flanagan  PGY2 Solid Organ Transplant Pharmacy Resident         Patient brief summary:  Marshall Corona is a 47 y.o. male initiated on antimicrobial therapy with IV Vancomycin for treatment of skin & soft tissue infection - right axillary abscess s/p I&D 10/18.    The patient's current regimen is dose by level    Drug Allergies:   Review of patient's allergies indicates:   Allergen Reactions    Codeine Itching, Anaphylaxis and Hives    Hydrocodone-acetaminophen      "Agitation"    Influenza virus vaccines Other (See Comments)    Oxycodone-acetaminophen      "Agitation"       Actual Body Weight:   103.5 kg    Renal Function:   Estimated Creatinine Clearance: 46.3 mL/min (A) (based on SCr of 2.4 mg/dL (H)).,     Dialysis Method (if applicable):  N/A    CBC (last 72 hours):  Recent Labs   Lab Result Units 10/21/22  0838 10/22/22  1535 10/23/22  0618   WBC K/uL 18.42* 21.07* 14.85*   Hemoglobin g/dL 9.0* 9.5* 8.3* "   Hematocrit % 26.2* 28.5* 24.8*   Platelets K/uL 144* 149* 114*   Gran % % 76.5* 80.1* 74.3*   Lymph % % 9.2* 8.1* 11.4*   Mono % % 9.2 8.5 10.0   Eosinophil % % 3.6 2.4 3.1   Basophil % % 0.6 0.4 0.7   Differential Method  Automated Automated Automated       Metabolic Panel (last 72 hours):  Recent Labs   Lab Result Units 10/20/22  1923 10/21/22  0546 10/22/22  1050 10/22/22  1317 10/22/22  1649 10/22/22  2345 10/22/22  2346 10/23/22  0619   Sodium mmol/L  --  133*  133* 133*  --   --   --   --  128*   Sodium, Urine mmol/L <5*  --   --   --  <5* <10*  --   --    Potassium mmol/L  --  4.7  4.7 6.1* 4.5  --   --   --  4.3   Potassium, Urine mmol/L 39  --   --   --   --  48  --   --    Chloride mmol/L  --  101  101 106  --   --   --   --  99   Chloride, Urine mmol/L  --   --   --   --   --  <20*  --   --    CO2 mmol/L  --  17*  17* 14*  --   --   --   --  20*   Glucose mg/dL  --  132*  132* 126*  --   --   --   --  125*   Glucose, UA   --   --   --   --  Negative  --  Negative  --    BUN mg/dL  --  53*  53* 58*  --   --   --   --  53*   Creatinine mg/dL  --  2.00*  2.00* 3.80*  --   --   --   --  2.4*   Creatinine, Urine mg/dL 138.2  138.2  138.2  --   --   --   --  163.0  163.0  --   --    Albumin g/dL  --  2.5*  2.5* 2.5*  --   --   --   --  2.6*   Total Bilirubin mg/dL  --  9.8* 10.2*  --   --   --   --  9.7*   Alkaline Phosphatase U/L  --  246* 189*  --   --   --   --  171*   AST U/L  --  81* 88*  --   --   --   --  54*   ALT U/L  --  35 32  --   --   --   --  25   Magnesium mg/dL  --   --   --   --   --   --   --  1.7   Phosphorus mg/dL  --  4.6*  --   --   --   --   --  4.2       Vancomycin Administrations:  vancomycin given in the last 96 hours                     vancomycin 2 g in dextrose 5 % 500 mL IVPB (mg) 2,000 mg New Bag 10/23/22 0254                    Microbiologic Results:  Microbiology Results (last 7 days)       Procedure Component Value Units Date/Time    Gram stain [[id number]]  Collected: 10/22/22 2346    Order Status: Completed Specimen: Urine Updated: 10/23/22 1448     Gram Stain Result No WBC's      No organisms seen    (rule out SBP) Aerobic culture [[id number]]     Order Status: No result Specimen: Ascites     (rule out SBP) Culture, Anaerobic [[id number]]     Order Status: No result Specimen: Ascites     (rule out SBP) Gram stain [[id number]]     Order Status: No result Specimen: Ascites     Gram stain [[id number]]     Order Status: Completed Specimen: Urine     Urine culture [[id number]] Collected: 10/22/22 2346    Order Status: No result Specimen: Urine Updated: 10/23/22 0036

## 2022-10-23 NOTE — ASSESSMENT & PLAN NOTE
MELD-Na score: 32 at 10/23/2022  6:19 AM  MELD score: 29 at 10/23/2022  6:19 AM  Calculated from:  Serum Creatinine: 2.4 mg/dL at 10/23/2022  6:19 AM  Serum Sodium: 128 mmol/L at 10/23/2022  6:19 AM  Total Bilirubin: 9.7 mg/dL at 10/23/2022  6:19 AM  INR(ratio): 1.7 at 10/23/2022  6:18 AM  Age: 47 years    - per primary

## 2022-10-23 NOTE — NURSING
Pt arrived to [facility] room 92829 at this time.  Pt is AAO4; he was pulled over from stretcher to bed with 4 person assistance.  Pt states that he can walk with assistance devices/people, but not on his own.  Bruising noted to extremities, skin and sclera yellow, abdomen distended.  Foam dressing to R axillae is CDI.  Gauze dressing to RLQ is CDI.  Foam dressing applied to sacrum for protection. VSS/afebrile.  Wctm.

## 2022-10-23 NOTE — ASSESSMENT & PLAN NOTE
This is a 47 year old male with  a PMH significant for DVT (diagnosed in 11/2019 and status post treatment with Apixaban), Guillain [place] (diagnosed in 2020, attributed to preceding insect bite, and associated with weakness and neuropathy of bilateral lower extremities), ETOH abuse (daily ETOH consumption since age 16 with last drink reportedly in 08/2022), HTN, and T2DM who presented to [facility] on 10/22/2022 as a transfer from [facility] for management of newly diagnosed decompensated cirrhosis associated with significant ascites, abscess of right axilla (status post I&D on 10/18) and KATHE with concern for needing liver transplant evaluation. Presentation here notable for evidence of ascites, improving KATHE, and MELD >30.     Recommendations:     -Serological work-up for other etiologies of liver disease other than ETOH (ordered).   -Follow-up PETH.    -Obtain repeat diagnostic and therapeutic paracentesis and send fluid studies to rule out SBP (culture, gram stain, cell count, albumin, total protein, LDH, and amylase).   -Obtain ECHO.   -Evaluation by addiction psychiatry.   -Follow-up nephrology recommendations for KATHE management.  -Midodrine TID.  -Lactulose TID and Rifaximin BID for encephalopathy management; titrate Lactulose to 3 bowel movements daily.   -PT/OT to assess functional status.   -CMP and INR daily.   -Avoid use of medications that may precipitate encephalopathy (e.g. opioids and benzo's).   -Given high MELD score, patient warrants consideration for transplant. However will hold off on initiation of transplant evaluation until further work-up of liver disease, evaluation by addiction psychiatry, PT/OT assessment, and evaluation by transplant .

## 2022-10-23 NOTE — ASSESSMENT & PLAN NOTE
Check PETH  -reported last drink was few months ago  -continue MVI & Folic acid, add thiamine supplementation

## 2022-10-23 NOTE — ASSESSMENT & PLAN NOTE
S/p I &D at outside hospital  -patient was on Linezolid,  Ceftriaxone added today     -switch to vancomycin IV for now - wound cultures pending, does not appear gram stain sent.

## 2022-10-23 NOTE — PLAN OF CARE
Pt aao x3. Vss. No acute distress. Pt very deconditioned. PT and OT ordered as well as a dietician consult. Pt complaining of peripheral neuropathy- home Lyrica ordered. Pt had a US of the liver and a retroperitoneal US today. See results for details. Pt started on midodrine and octreotide. Psych consulted. Order written for pt to have a paracentesis with labs tomorrow. Echo ordered. Pt is bed bound. Pt able to roll to side, but complains of extreme abdominal pain. Pt's mother and sister coming in town from [place] today to come see him. See assessment for full chart details. Will continue to monitor, assess, and adjust care as needed.

## 2022-10-23 NOTE — ASSESSMENT & PLAN NOTE
Patient with baseline creatinine of 1.0 which worsened to creatinine of 1.4 at the time of admission to OSH and subsequently 3.8 upon [place] [facility]  Notably, creatinine 2.4 upon repeat so suspect 3.8 was erroneous. In the setting of cirrhosis, there was some concern for hepatorenal syndrome so patient was started on albumin and midodrine.  Urine sodium (<10) consistent with hepatorenal syndrome but patient with robust blood pressures so less likely.  Lower suspicion for abdominal compartment syndrome given recent paracentesis.  Bilirubin elevated.  Suspect either pre renal versus ATN v bilirubin tubulopathy.    - lower suspicion but okay to continue HRS regimen at this time  - volume expansion with albumin  - will plan to assess urine sediment  - strict intake and output  - maintain map > 65  - renally dose medications and avoid nephrotoxins when possible

## 2022-10-23 NOTE — PLAN OF CARE
Admit 10/22 from OSH for concern for hepatorenal syndrome and liver eval  -AAO4, VSS/afebrile, on RA, reports pain to abdomen  -last para 10/21 - 7.7 L off  -hepatology/nephrology consulted  -pt takes PO lactulose  -WBC 21.07; q24 rocephin ordered  -incision to R axillae with dressing CDI, plan for dressing changes q3 days  -low Na diet ordered  -voids per urinal, LBM 10/22  -fall precautions maintained, SCDs in place, call bell in reach

## 2022-10-23 NOTE — ASSESSMENT & PLAN NOTE
-send additional serologic w/u for liver disease etiologies  -hepatology consult in AM  -check Liver Doppler ultrasound   -sodium and fluid restriction   -May need another diagnostic vs therapeutic paracentesis soon.  Review of [facility] labs do not see that any Ascitic fluid was send for Cell Ct Diff or studies.   -Check PETH

## 2022-10-23 NOTE — ASSESSMENT & PLAN NOTE
Resolved, Required PRBC transfusion prior to transfer.   -trend daily CBC  -no active GI bleeding.

## 2022-10-23 NOTE — ASSESSMENT & PLAN NOTE
Patient with baseline hyponatremia but worsening to 128 at the time of consult.  Suspect likely related to underlying cirrhosis versus hypovolemic hyponatremia given history.     - would recommend repeating urine sodium and osmolality, serum osmolality  - daily BMP

## 2022-10-23 NOTE — ASSESSMENT & PLAN NOTE
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

## 2022-10-23 NOTE — SUBJECTIVE & OBJECTIVE
"Review of Systems   Constitutional:  Positive for fatigue. Negative for appetite change, chills and fever.   HENT:  Negative for congestion, sore throat and trouble swallowing.    Eyes:  Negative for photophobia, pain and discharge.   Respiratory:  Negative for cough and shortness of breath.    Cardiovascular:  Negative for chest pain and palpitations.   Gastrointestinal:  Positive for abdominal distention. Negative for abdominal pain, diarrhea, nausea and vomiting.   Genitourinary:  Negative for difficulty urinating.   Musculoskeletal:  Negative for back pain, myalgias and neck pain.   Skin:  Positive for color change. Negative for pallor and rash.   Neurological:  Positive for weakness. Negative for light-headedness, numbness and headaches.     Past Medical History:   Diagnosis Date    Alcohol abuse 10/23/2022    Allergy     Guillain-[place] syndrome     Hypertension     Peripheral venous insufficiency 6/21/2021    Pulmonary hypertension 8/3/2021    Thrombosis of left saphenous vein 11/26/2019    Uncomplicated alcohol dependence 10/28/2019    Vitamin B12 deficiency (non anemic) 4/30/2020       Past Surgical History:   Procedure Laterality Date    ARTHROSCOPY OF KNEE  2001    Right knee scope    EYE SURGERY      bilateral lasic    INCISION AND DRAINAGE OF ABSCESS Right 10/18/2022    Procedure: INCISION AND DRAINAGE, ABSCESS-AXILLA;  Surgeon: Geovanni Lee MD;  Location: [facility];  Service: General;  Laterality: Right;     Review of patient's allergies indicates:   Allergen Reactions    Codeine Itching, Anaphylaxis and Hives    Hydrocodone-acetaminophen      "Agitation"    Influenza virus vaccines Other (See Comments)    Oxycodone-acetaminophen      "Agitation"         Tobacco Use    Smoking status: Never    Smokeless tobacco: Current     Types: Chew   Substance and Sexual Activity    Alcohol use: Not Currently     Comment: "quit 8wks ago" as of 10/15/2022    Drug use: Never    Sexual activity: Not on file "       Medications Prior to Admission   Medication Sig Dispense Refill Last Dose    amitriptyline (ELAVIL) 50 MG tablet Take 50 mg by mouth every evening.       benazepriL (LOTENSIN) 20 MG tablet Take 20 mg by mouth 2 (two) times daily.       bisoprolol (ZEBETA) 5 MG tablet Take 5 mg by mouth once daily.       fluticasone propionate (FLONASE) 50 mcg/actuation nasal spray 1 spray (50 mcg total) by Each Nostril route 2 (two) times daily. 16 g 1     folic acid (FOLVITE) 1 MG tablet Take 1,000 mcg by mouth once daily.       lactulose (CHRONULAC) 10 gram/15 mL solution Take 15 mLs by mouth 2 (two) times daily.       multivitamin (ONE DAILY MULTIVITAMIN) per tablet Take 1 tablet by mouth once daily.       nystatin (MYCOSTATIN) cream Apply 1 g topically 2 (two) times daily.       pantoprazole (PROTONIX) 40 MG tablet Take 40 mg by mouth 2 (two) times daily.       pregabalin (LYRICA) 100 MG capsule Take 100 mg by mouth 3 (three) times daily.          Objective:     Vital Signs (Most Recent):  Temp: 98.1 °F (36.7 °C) (10/23/22 0500)  Pulse: 99 (10/23/22 0500)  Resp: 16 (10/23/22 0500)  BP: 123/76 (10/23/22 0500)  SpO2: 99 % (10/23/22 0500) Vital Signs (24h Range):  Temp:  [97.7 °F (36.5 °C)-98.1 °F (36.7 °C)] 98.1 °F (36.7 °C)  Pulse:  [] 99  Resp:  [13-18] 16  SpO2:  [98 %-100 %] 99 %  BP: (100-144)/(63-86) 123/76     Weight: 103.5 kg (228 lb 2.8 oz) (10/22/22 2053)  Body mass index is 30.95 kg/m².    Physical Exam  Constitutional:       Appearance: Normal appearance.   Eyes:      General: Scleral icterus present.      Conjunctiva/sclera: Conjunctivae normal.   Cardiovascular:      Rate and Rhythm: Normal rate and regular rhythm.      Pulses: Normal pulses.      Heart sounds: Normal heart sounds.   Pulmonary:      Effort: Pulmonary effort is normal. No respiratory distress.      Breath sounds: Normal breath sounds.   Abdominal:      General: Bowel sounds are normal. There is distension.      Palpations: Abdomen is soft.       Tenderness: There is no abdominal tenderness.   Musculoskeletal:      Right lower leg: No edema.      Left lower leg: No edema.   Skin:     General: Skin is warm and dry.      Coloration: Skin is jaundiced.      Findings: No bruising or rash.   Neurological:      Mental Status: He is alert and oriented to person, place, and time.       MELD-Na score: 34 at 10/22/2022  3:35 PM  MELD score: 33 at 10/22/2022  3:35 PM  Calculated from:  Serum Creatinine: 3.80 mg/dL at 10/22/2022 10:50 AM  Serum Sodium: 133 mmol/L at 10/22/2022 10:50 AM  Total Bilirubin: 10.2 mg/dL at 10/22/2022 10:50 AM  INR(ratio): 1.6 at 10/22/2022  3:35 PM  Age: 47 years    Significant Labs:  Labs within the past month have been reviewed.    Significant Imaging:  CT: Reviewed

## 2022-10-23 NOTE — HPI
Mr. Marshall Corona is a 47 year old male for whom hepatology is consulted for management of decompensated cirrhosis. 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     Patient reports developing progressive abdominal distension and bilateral lower extremity swelling in approximately 07/2022 associated with friends/family noticing skin and eye yellowing. He reports symptom association with same duration of progressive generalized weakness.     He reports being admitted at [facility] from 10/08/2022 to 10/12/2022 for evaluation of symptoms; records for this hospitalization not available for review. He reports being told he was anemic and given 2U PRBC with improvement in weakness. He also reports undergoing an EGD, but does not recall results. He reports abdominal distension and skin/eye yellowing was not addressed at this admission. Following discharge, his weakness again worsened and he then presented to [facility] on 10/15.     Hospital Course: On arrival to [facility] on 10/15/2022, his physical exam was notable for evidence of jaundice/scleral icterus with abdominal distension and swelling of his right axilla. His presentation was notable for labs showing leukocytosis (12), macrocytic anemia (Hgb 7.2), elevated INR (1.6), hyponatremia (132), KATHE (Cr 1.4), hyperbilirubinemia (8.8), and transaminitis (AST 68). Hepatitis panel negative. CT A/P showed cirrhosis with moderate volume ascites and a 1.4 X 1 cm splenic lesion. He was admitted to hospital medicine and given 2U PRBC. He was noted to have a rising leukocytosis and general surgery consulted on 10/18 with concern for abscess of right axilla; he underwent I&D on 10/18 and was treated with Clindamycin and  "Linezolid. He was noted to develop a worsening KATHE by 10/20 and nephrology consulted; he was initiated on Midodrine for suspected HRS. He underwent paracentesis on 10/21 with 7L of fluid removed (not analyzed) with worsening KATHE afterwards despite receiving Albumin after procedure. He was also noted to have a rising MELD score by 10/21 (35). Patient was then transferred to [facility] on 10/22 with concern for needing liver transplant evaluation.     On initial bedside interview, patient reports continued weakness and abdominal distension. He denies melena or hematochezia. He denies prior known history of liver disease, prior episodes of hepatitis, family history of liver disease, fevers, chills, and history of IVDU or tobacco usage. He reports daily consumption of ETOH since approximately age 16 that increased in frequency after being diagnosed with GBS in 2020 due to being stuck inside his home from subsequent debility. He is unable to quantify the exact amount he drinks daily; he reports drinking "until I am done". His last drink was in approximately 08/2022. He reports one prior DUI >20 years ago with subsequent enrollment in [employer] and anger management, however with continued drinking. 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 He is  and has on son. He requires a walker for assistance with ambulation and is currently looking for a new home due to living in a second floor walk-up and having difficulty using stairs due to lower extremity weakness.   "

## 2022-10-23 NOTE — ASSESSMENT & PLAN NOTE
Patient with acute kidney injury likely due to pre-renal azotemia and vs ATN vs  possible hepatorenal syndrome KATHE is currently worsening. Labs reviewed- Renal function/electrolytes with Estimated Creatinine Clearance: 29.3 mL/min (A) (based on SCr of 3.8 mg/dL (H)). according to latest data. Monitor urine output and serial BMP and adjust therapy as needed. Avoid nephrotoxins and renally dose meds for GFR listed above.   -obtain urine electrolytes  -obtain Nephrology consult  -obtain retroperitoneal u/s   -continue patient on Midodrine   -Schedule 1gram/kg of iv albumin every 8 hours - normalize serum albumin and eval for improvement in creatinine  -consider up to 1.5l of saline infusion

## 2022-10-23 NOTE — HPI
Marshall Corona is a 47-year-old male with hypertension, diabetes, alcoholic cirrhosis who presented to OSH with weakness and abdominal distention. [place] [facility].  During hospitalization at OSH patient noted to have right axillary abscess which was drained and he was subsequently started on clindamycin and transitioned to linezolid.  Additionally patient with worsening renal function during admission with creatinine of 1.4 on admit with subsequent worsening up to 3.8 at the time of transfer.  Outside records noting worsening renal function following paracentesis and associated hypotension for which he received albumin and was started on midodrine.  Nephrology was consulted for the management of KATHE.

## 2022-10-23 NOTE — HPI
TRANSFER CENTER  PHYSICIAN SUMMARY  47-year-old m with PMH HTN, GBS, diabetes, presumed alcoholic cirrhosis admitted to [facility] on 10/15 with weakness and abd distension.  Patient reports that his last drink was 2 months ago.     On admission VS unremarkable.  PEx pos for scleral icterus and abd distension with no tenderness.  He was AOX3.  WBC 12.9, Hb 7.2, Plts 142, INR 1.6 > 1. Na 132, K 3.7, CO2 21, BUN 50, Cr 1.41 (BL 0.91), bilirubin 8.8, AST 68, ALT 37, HV A/B/C neg.  COVID neg.       CT abd pos for cirrhosis with moderate volume ascites.  Also, 1.4 x 1 cm nonspecific hypoattenuating lesion in near the splenic hilum.  US abdomen pos for cirrhotic liver.  Stasis flow suggestive of portal HTN.  Moderate ascites.       On 10/18 the patient was noted to have a right axillary abscess a/w rising WBC.  The abscess was drained and patient placed on clindamycin > linezolid and CTX.  Cultures NGTD.     Hospitalization a/w increasing renal failure with  Cr 1.41 (10/15) > 1.66 (10/19) > 2 (10/21) > 3.8 (10/22) following a large volume paracentesis on 10/21.  BP fell into the mid 90s briefly following the procedure. Patient was given albumin (25 g x 4 ) and placed on midodrine     Also hosppitalization a/w increasing liver failure: T bili 8.8 > > 10.2, AST 68 > > 88. MELD Na 35     Transfer requested for liver XPL evaluation.  Transfer diagnosis acute liver failure, acute renal failure with concern for hepatorenal syndrome.     BEDSIDE EVAL    At bedside patient reports he feels ok.  Before paracentesis on 10/21, he's had one prior at a another hospital earlier this month where 4L was removed.  He feels after recent paracentesis he has increased abdominal swelling and pain, feels fluid has reaccumulated faster or he is more bloated.  Reports last alcohol use was a few months ago, states he quit prior to diagnosis of liver disease.     Reports having Guillain [place] syndrome in 2019 after an insect bite, stated that he  has not fully recovered from this, after his discharge and receiving rehab, continued as an outpatient but when pandemic started in 2020, could not continue prior rehab efforts and reports chronic debility as a result.   \  He uses smokeless tobacco [employer].  He denies any hx of Intra-nasal or injection illicit drug use.

## 2022-10-24 LAB
ALBUMIN FLD-MCNC: 1 G/DL
ALBUMIN SERPL BCP-MCNC: 3.5 G/DL (ref 3.5–5.2)
ALP SERPL-CCNC: 147 U/L (ref 55–135)
ALT SERPL W/O P-5'-P-CCNC: 26 U/L (ref 10–44)
ANA SER QL IF: NORMAL
ANION GAP SERPL CALC-SCNC: 10 MMOL/L (ref 8–16)
APPEARANCE FLD: CLEAR
ASCENDING AORTA: 3.05 CM
AST SERPL-CCNC: 74 U/L (ref 10–40)
AV INDEX (PROSTH): 0.76
AV MEAN GRADIENT: 10 MMHG
AV PEAK GRADIENT: 18 MMHG
AV VALVE AREA: 2.93 CM2
AV VELOCITY RATIO: 0.75
BACTERIA UR CULT: NO GROWTH
BASOPHILS # BLD AUTO: 0.11 K/UL (ref 0–0.2)
BASOPHILS NFR BLD: 0.6 % (ref 0–1.9)
BILIRUB SERPL-MCNC: 13.2 MG/DL (ref 0.1–1)
BODY FLD TYPE: NORMAL
BODY FLUID SOURCE, LDH: NORMAL
BSA FOR ECHO PROCEDURE: 2.29 M2
BUN SERPL-MCNC: 52 MG/DL (ref 6–20)
CALCIUM SERPL-MCNC: 9 MG/DL (ref 8.7–10.5)
CHLORIDE SERPL-SCNC: 99 MMOL/L (ref 95–110)
CO2 SERPL-SCNC: 20 MMOL/L (ref 23–29)
COLOR FLD: YELLOW
CREAT SERPL-MCNC: 2.6 MG/DL (ref 0.5–1.4)
CV ECHO LV RWT: 0.43 CM
DIFFERENTIAL METHOD: ABNORMAL
DOP CALC AO PEAK VEL: 2.13 M/S
DOP CALC AO VTI: 37.15 CM
DOP CALC LVOT AREA: 3.8 CM2
DOP CALC LVOT DIAMETER: 2.21 CM
DOP CALC LVOT PEAK VEL: 1.6 M/S
DOP CALC LVOT STROKE VOLUME: 108.85 CM3
DOP CALCLVOT PEAK VEL VTI: 28.39 CM
E WAVE DECELERATION TIME: 265.84 MSEC
E/A RATIO: 0.65
E/E' RATIO: 5.82 M/S
ECHO LV POSTERIOR WALL: 0.88 CM (ref 0.6–1.1)
EJECTION FRACTION: 70 %
EOSINOPHIL # BLD AUTO: 0.8 K/UL (ref 0–0.5)
EOSINOPHIL NFR BLD: 4.3 % (ref 0–8)
ERYTHROCYTE [DISTWIDTH] IN BLOOD BY AUTOMATED COUNT: 19.9 % (ref 11.5–14.5)
EST. GFR  (NO RACE VARIABLE): 29.3 ML/MIN/1.73 M^2
FRACTIONAL SHORTENING: 30 % (ref 28–44)
GLUCOSE SERPL-MCNC: 135 MG/DL (ref 70–110)
GRAM STN SPEC: NORMAL
GRAM STN SPEC: NORMAL
HCT VFR BLD AUTO: 25.1 % (ref 40–54)
HGB BLD-MCNC: 8.2 G/DL (ref 14–18)
IMM GRANULOCYTES # BLD AUTO: 0.07 K/UL (ref 0–0.04)
IMM GRANULOCYTES NFR BLD AUTO: 0.4 % (ref 0–0.5)
INR PPP: 1.6 (ref 0.8–1.2)
INTERVENTRICULAR SEPTUM: 0.93 CM (ref 0.6–1.1)
IVRT: 78.02 MSEC
LA MAJOR: 4.66 CM
LA MINOR: 5.2 CM
LA WIDTH: 3.41 CM
LDH FLD L TO P-CCNC: 107 U/L
LEFT ATRIUM SIZE: 3.35 CM
LEFT ATRIUM VOLUME INDEX: 21.2 ML/M2
LEFT ATRIUM VOLUME: 47.73 CM3
LEFT INTERNAL DIMENSION IN SYSTOLE: 2.86 CM (ref 2.1–4)
LEFT VENTRICLE DIASTOLIC VOLUME INDEX: 32.63 ML/M2
LEFT VENTRICLE DIASTOLIC VOLUME: 73.42 ML
LEFT VENTRICLE MASS INDEX: 51 G/M2
LEFT VENTRICLE SYSTOLIC VOLUME INDEX: 13.8 ML/M2
LEFT VENTRICLE SYSTOLIC VOLUME: 31.03 ML
LEFT VENTRICULAR INTERNAL DIMENSION IN DIASTOLE: 4.08 CM (ref 3.5–6)
LEFT VENTRICULAR MASS: 114.1 G
LV LATERAL E/E' RATIO: 7.11 M/S
LV SEPTAL E/E' RATIO: 4.92 M/S
LYMPHOCYTES # BLD AUTO: 2.1 K/UL (ref 1–4.8)
LYMPHOCYTES NFR BLD: 11.8 % (ref 18–48)
LYMPHOCYTES NFR FLD MANUAL: 25 %
MAGNESIUM SERPL-MCNC: 1.8 MG/DL (ref 1.6–2.6)
MCH RBC QN AUTO: 34.7 PG (ref 27–31)
MCHC RBC AUTO-ENTMCNC: 32.7 G/DL (ref 32–36)
MCV RBC AUTO: 106 FL (ref 82–98)
MESOTHL CELL NFR FLD MANUAL: 28 %
MONOCYTES # BLD AUTO: 1.6 K/UL (ref 0.3–1)
MONOCYTES NFR BLD: 9.1 % (ref 4–15)
MONOS+MACROS NFR FLD MANUAL: 29 %
MV A" WAVE DURATION": 10.85 MSEC
MV PEAK A VEL: 0.98 M/S
MV PEAK E VEL: 0.64 M/S
MV STENOSIS PRESSURE HALF TIME: 77.09 MS
MV VALVE AREA P 1/2 METHOD: 2.85 CM2
NEUTROPHILS # BLD AUTO: 12.8 K/UL (ref 1.8–7.7)
NEUTROPHILS NFR BLD: 73.8 % (ref 38–73)
NEUTROPHILS NFR FLD MANUAL: 18 %
NRBC BLD-RTO: 0 /100 WBC
PHOSPHATE SERPL-MCNC: 5.1 MG/DL (ref 2.7–4.5)
PISA TR MAX VEL: 2.78 M/S
PLATELET # BLD AUTO: 132 K/UL (ref 150–450)
PMV BLD AUTO: 12.9 FL (ref 9.2–12.9)
POTASSIUM SERPL-SCNC: 4.3 MMOL/L (ref 3.5–5.1)
PROT FLD-MCNC: 1.8 G/DL
PROT SERPL-MCNC: 5.9 G/DL (ref 6–8.4)
PROTHROMBIN TIME: 16.3 SEC (ref 9–12.5)
PULM VEIN S/D RATIO: 1.64
PV PEAK D VEL: 0.47 M/S
PV PEAK S VEL: 0.77 M/S
RA MAJOR: 4.59 CM
RA PRESSURE: 3 MMHG
RA WIDTH: 2.4 CM
RBC # BLD AUTO: 2.36 M/UL (ref 4.6–6.2)
RIGHT VENTRICULAR END-DIASTOLIC DIMENSION: 2.76 CM
RV TISSUE DOPPLER FREE WALL SYSTOLIC VELOCITY 1 (APICAL 4 CHAMBER VIEW): 12.24 CM/S
SINUS: 3.29 CM
SODIUM SERPL-SCNC: 129 MMOL/L (ref 136–145)
SPECIMEN SOURCE: NORMAL
SPECIMEN SOURCE: NORMAL
STJ: 2.7 CM
TDI LATERAL: 0.09 M/S
TDI SEPTAL: 0.13 M/S
TDI: 0.11 M/S
TR MAX PG: 31 MMHG
TRICUSPID ANNULAR PLANE SYSTOLIC EXCURSION: 2.57 CM
TV REST PULMONARY ARTERY PRESSURE: 34 MMHG
WBC # BLD AUTO: 17.42 K/UL (ref 3.9–12.7)
WBC # FLD: 35 /CU MM

## 2022-10-24 PROCEDURE — 20600001 HC STEP DOWN PRIVATE ROOM

## 2022-10-24 PROCEDURE — 97165 OT EVAL LOW COMPLEX 30 MIN: CPT

## 2022-10-24 PROCEDURE — 88305 TISSUE EXAM BY PATHOLOGIST: CPT | Performed by: PATHOLOGY

## 2022-10-24 PROCEDURE — 87075 CULTR BACTERIA EXCEPT BLOOD: CPT | Performed by: HOSPITALIST

## 2022-10-24 PROCEDURE — 84100 ASSAY OF PHOSPHORUS: CPT | Performed by: HOSPITALIST

## 2022-10-24 PROCEDURE — 89051 BODY FLUID CELL COUNT: CPT | Performed by: HOSPITALIST

## 2022-10-24 PROCEDURE — 88112 PR  CYTOPATH, CELL ENHANCE TECH: ICD-10-PCS | Mod: 26,,, | Performed by: PATHOLOGY

## 2022-10-24 PROCEDURE — 85610 PROTHROMBIN TIME: CPT | Performed by: HOSPITALIST

## 2022-10-24 PROCEDURE — 63600175 PHARM REV CODE 636 W HCPCS: Performed by: HOSPITALIST

## 2022-10-24 PROCEDURE — 90792 PR PSYCHIATRIC DIAGNOSTIC EVALUATION W/MEDICAL SERVICES: ICD-10-PCS | Mod: AF,HB,, | Performed by: PSYCHIATRY & NEUROLOGY

## 2022-10-24 PROCEDURE — 99232 PR SUBSEQUENT HOSPITAL CARE,LEVL II: ICD-10-PCS | Mod: ,,, | Performed by: HOSPITALIST

## 2022-10-24 PROCEDURE — 25000003 PHARM REV CODE 250: Performed by: HOSPITALIST

## 2022-10-24 PROCEDURE — 80053 COMPREHEN METABOLIC PANEL: CPT | Performed by: HOSPITALIST

## 2022-10-24 PROCEDURE — 87205 SMEAR GRAM STAIN: CPT | Performed by: HOSPITALIST

## 2022-10-24 PROCEDURE — 85025 COMPLETE CBC W/AUTO DIFF WBC: CPT | Performed by: HOSPITALIST

## 2022-10-24 PROCEDURE — 36415 COLL VENOUS BLD VENIPUNCTURE: CPT | Performed by: HOSPITALIST

## 2022-10-24 PROCEDURE — 88305 TISSUE EXAM BY PATHOLOGIST: CPT | Mod: 26,,, | Performed by: PATHOLOGY

## 2022-10-24 PROCEDURE — 86381 MITOCHONDRIAL ANTIBODY EACH: CPT | Performed by: STUDENT IN AN ORGANIZED HEALTH CARE EDUCATION/TRAINING PROGRAM

## 2022-10-24 PROCEDURE — 99232 SBSQ HOSP IP/OBS MODERATE 35: CPT | Mod: ,,, | Performed by: HOSPITALIST

## 2022-10-24 PROCEDURE — 84157 ASSAY OF PROTEIN OTHER: CPT | Performed by: HOSPITALIST

## 2022-10-24 PROCEDURE — 88112 CYTOPATH CELL ENHANCE TECH: CPT | Performed by: PATHOLOGY

## 2022-10-24 PROCEDURE — 83735 ASSAY OF MAGNESIUM: CPT | Performed by: HOSPITALIST

## 2022-10-24 PROCEDURE — P9047 ALBUMIN (HUMAN), 25%, 50ML: HCPCS | Mod: JG | Performed by: HOSPITALIST

## 2022-10-24 PROCEDURE — 97535 SELF CARE MNGMENT TRAINING: CPT

## 2022-10-24 PROCEDURE — 88112 CYTOPATH CELL ENHANCE TECH: CPT | Mod: 26,,, | Performed by: PATHOLOGY

## 2022-10-24 PROCEDURE — 97161 PT EVAL LOW COMPLEX 20 MIN: CPT

## 2022-10-24 PROCEDURE — 63600175 PHARM REV CODE 636 W HCPCS: Mod: JG | Performed by: HOSPITALIST

## 2022-10-24 PROCEDURE — 90792 PSYCH DIAG EVAL W/MED SRVCS: CPT | Mod: AF,HB,, | Performed by: PSYCHIATRY & NEUROLOGY

## 2022-10-24 PROCEDURE — 82042 OTHER SOURCE ALBUMIN QUAN EA: CPT | Performed by: HOSPITALIST

## 2022-10-24 PROCEDURE — 87070 CULTURE OTHR SPECIMN AEROBIC: CPT | Performed by: HOSPITALIST

## 2022-10-24 PROCEDURE — 88305 TISSUE EXAM BY PATHOLOGIST: ICD-10-PCS | Mod: 26,,, | Performed by: PATHOLOGY

## 2022-10-24 PROCEDURE — 83615 LACTATE (LD) (LDH) ENZYME: CPT | Performed by: HOSPITALIST

## 2022-10-24 PROCEDURE — 97116 GAIT TRAINING THERAPY: CPT

## 2022-10-24 RX ORDER — ALBUMIN HUMAN 250 G/1000ML
25 SOLUTION INTRAVENOUS 2 TIMES DAILY
Status: COMPLETED | OUTPATIENT
Start: 2022-10-24 | End: 2022-10-25

## 2022-10-24 RX ADMIN — LACTULOSE 10 G: 20 SOLUTION ORAL at 09:10

## 2022-10-24 RX ADMIN — MICONAZOLE NITRATE 2 % TOPICAL POWDER: at 08:10

## 2022-10-24 RX ADMIN — Medication 1 CAPSULE: at 09:10

## 2022-10-24 RX ADMIN — ALBUMIN (HUMAN) 25 G: 12.5 SOLUTION INTRAVENOUS at 11:10

## 2022-10-24 RX ADMIN — MICONAZOLE NITRATE 2 % TOPICAL POWDER: at 11:10

## 2022-10-24 RX ADMIN — FOLIC ACID 1000 MCG: 1 TABLET ORAL at 09:10

## 2022-10-24 RX ADMIN — SODIUM BICARBONATE 650 MG TABLET 1300 MG: at 08:10

## 2022-10-24 RX ADMIN — VANCOMYCIN HYDROCHLORIDE 1500 MG: 1.5 INJECTION, POWDER, LYOPHILIZED, FOR SOLUTION INTRAVENOUS at 01:10

## 2022-10-24 RX ADMIN — SODIUM BICARBONATE 650 MG TABLET 1300 MG: at 09:10

## 2022-10-24 RX ADMIN — PHYTONADIONE 5 MG: 10 INJECTION, EMULSION INTRAMUSCULAR; INTRAVENOUS; SUBCUTANEOUS at 11:10

## 2022-10-24 RX ADMIN — PREGABALIN 75 MG: 75 CAPSULE ORAL at 09:10

## 2022-10-24 RX ADMIN — MIDODRINE HYDROCHLORIDE 10 MG: 5 TABLET ORAL at 08:10

## 2022-10-24 RX ADMIN — ALBUMIN (HUMAN) 25 G: 12.5 SOLUTION INTRAVENOUS at 08:10

## 2022-10-24 RX ADMIN — PREGABALIN 75 MG: 75 CAPSULE ORAL at 08:10

## 2022-10-24 RX ADMIN — OCTREOTIDE ACETATE 100 MCG: 50 INJECTION, SOLUTION INTRAVENOUS; SUBCUTANEOUS at 08:10

## 2022-10-24 RX ADMIN — CEFTRIAXONE SODIUM 2 G: 2 INJECTION, SOLUTION INTRAVENOUS at 12:10

## 2022-10-24 RX ADMIN — SODIUM BICARBONATE 650 MG TABLET 1300 MG: at 02:10

## 2022-10-24 RX ADMIN — MIDODRINE HYDROCHLORIDE 10 MG: 5 TABLET ORAL at 05:10

## 2022-10-24 RX ADMIN — Medication 100 MG: at 09:10

## 2022-10-24 RX ADMIN — LACTULOSE 10 G: 20 SOLUTION ORAL at 08:10

## 2022-10-24 RX ADMIN — OCTREOTIDE ACETATE 100 MCG: 50 INJECTION, SOLUTION INTRAVENOUS; SUBCUTANEOUS at 05:10

## 2022-10-24 RX ADMIN — OCTREOTIDE ACETATE 100 MCG: 50 INJECTION, SOLUTION INTRAVENOUS; SUBCUTANEOUS at 02:10

## 2022-10-24 RX ADMIN — MIDODRINE HYDROCHLORIDE 10 MG: 5 TABLET ORAL at 02:10

## 2022-10-24 NOTE — PLAN OF CARE
Problem: Physical Therapy  Goal: Physical Therapy Goal  Description: Goals to be met by: 2022     Patient will increase functional independence with mobility by performin. Supine to sit with Set-up [place]  2. Sit to stand transfer with supervision  3. Gait  x 150 feet with Supervision using Rolling Walker.   4. Ascend/descend 3 stair with bilateral Handrails Minimal Assistance using No Assistive Device.   5. Stand for 5 minutes with Modified [place] using Rolling Walker or no AD    Outcome: Ongoing, Progressing     Pt evaluated and appropriate goals established.

## 2022-10-24 NOTE — CONSULTS
"[facility] - Transplant Stepdown  Wound Care    Patient Name:  Marshall Corona   MRN:  [id number]  Date: 10/24/2022  Diagnosis: KATHE (acute kidney injury)    History:     Past Medical History:   Diagnosis Date    Alcohol abuse 10/23/2022    Allergy     Guillain-[place] syndrome     Hypertension     Peripheral venous insufficiency 6/21/2021    Pulmonary hypertension 8/3/2021    Thrombosis of left saphenous vein 11/26/2019    Uncomplicated alcohol dependence 10/28/2019    Vitamin B12 deficiency (non anemic) 4/30/2020       Social History     Socioeconomic History    Marital status: Single   Tobacco Use    Smoking status: Never    Smokeless tobacco: Current     Types: Chew   Substance and Sexual Activity    Alcohol use: Not Currently     Comment: "quit 8wks ago" as of 10/15/2022    Drug use: Never       Precautions:     Allergies as of 10/22/2022 - Reviewed 10/22/2022   Allergen Reaction Noted    Codeine Itching, Anaphylaxis, and Hives 08/23/2005    Hydrocodone-acetaminophen  10/16/2008    Influenza virus vaccines Other (See Comments) 10/22/2022    Oxycodone-acetaminophen  10/16/2008       WO Assessment Details/Treatment   Wound care consult received for the right axilla.  The patient states he had an abscess which was drained at [facility]. The wound is 2x1x0.3. The wound bed is pink, red and moist. It is a partial thickness skin loss.     Recommendations:  - Right axilla: nursing to cleanse with wound cleanser, pat dry, apply aquacel ag rope (cut to fit wound bed), and cover with a foam dressing every MWF   - Waffle mattress overlay  - Turning every 2 hours      10/24/22 1343        Incision/Site 10/18/22 2057 Right Axilla   Date First Assessed/Time First Assessed: 10/18/22 2057   Side: Right  Location: Axilla   Wound Image      Dressing Appearance Intact   Drainage Amount Small   Drainage Characteristics/Odor Serosanguineous   Appearance Red;Pink;Moist   Periwound Area Intact;Dry;Pink   Wound Edges Open   Dressing Foam "       Recommendations made to primary team for above plan via secure chat. Orders placed. Wound care to follow-up as needed.     10/24/2022

## 2022-10-24 NOTE — SUBJECTIVE & OBJECTIVE
"Interval History: NAEON. Patient states he feels "the best I've felt in a long time." Reports improved abdominal pain. Pending repeat therapeutic paracentesis. Making small amounts of dark urine.     Review of Systems   Constitutional:  Negative for chills and fever.   HENT:  Negative for congestion and sore throat.    Respiratory:  Negative for cough and shortness of breath.    Cardiovascular:  Negative for chest pain and leg swelling.   Gastrointestinal:  Positive for abdominal pain. Negative for constipation, diarrhea, nausea and vomiting.   Musculoskeletal:  Negative for joint pain and myalgias.   Neurological:  Negative for dizziness and loss of consciousness.   Psychiatric/Behavioral:  Negative for depression.        Review of patient's allergies indicates:   Allergen Reactions    Codeine Itching, Anaphylaxis and Hives    Hydrocodone-acetaminophen      "Agitation"    Influenza virus vaccines Other (See Comments)    Oxycodone-acetaminophen      "Agitation"     Current Facility-Administered Medications   Medication Frequency    acetaminophen tablet 650 mg Q8H PRN    albumin human 25% bottle 50 g BID    cefTRIAXone (ROCEPHIN) 2 g/50 mL D5W IVPB Q24H    dextrose 10% bolus 125 mL PRN    dextrose 10% bolus 250 mL PRN    folic acid tablet 1,000 mcg Daily    Lactobacillus rhamnosus GG capsule 1 capsule Daily    lactulose 20 gram/30 mL solution Soln 10 g BID    miconazole NITRATE 2 % top powder BID    miconazole nitrate 2% ointment PRN    midodrine tablet 10 mg Q8H    octreotide injection 100 mcg Q8H    ondansetron injection 4 mg Q6H PRN    phytonadione vitamin k (AQUA-MEPHYTON) 5 mg in dextrose 5 % 50 mL IVPB Daily    pregabalin capsule 75 mg BID    prochlorperazine injection Soln 5 mg Q6H PRN    sodium bicarbonate tablet 1,300 mg TID    thiamine tablet 100 mg Daily    vancomycin - pharmacy to dose pharmacy to manage frequency       Objective:     Vital Signs (Most Recent):  Temp: 98.7 °F (37.1 °C) (10/24/22 " 0945)  Pulse: 102 (10/24/22 0945)  Resp: 18 (10/24/22 0945)  BP: 111/71 (10/24/22 0945)  SpO2: 98 % (10/24/22 0945)  O2 Device (Oxygen Therapy): room air (10/23/22 0706) Vital Signs (24h Range):  Temp:  [97.3 °F (36.3 °C)-98.7 °F (37.1 °C)] 98.7 °F (37.1 °C)  Pulse:  [] 102  Resp:  [16-18] 18  SpO2:  [95 %-99 %] 98 %  BP: ()/(45-84) 111/71     Weight: 103.5 kg (228 lb 2.8 oz) (10/22/22 2053)  Body mass index is 30.95 kg/m².  Body surface area is 2.29 meters squared.    I/O last 3 completed shifts:  In: 400 [P.O.:400]  Out: 265 [Urine:265]    Physical Exam  Constitutional:       Appearance: Normal appearance.   Eyes:      General: Scleral icterus present.      Conjunctiva/sclera: Conjunctivae normal.   Cardiovascular:      Rate and Rhythm: Normal rate and regular rhythm.      Pulses: Normal pulses.      Heart sounds: Normal heart sounds.   Pulmonary:      Effort: Pulmonary effort is normal. No respiratory distress.      Breath sounds: Normal breath sounds.   Abdominal:      General: Bowel sounds are normal. There is distension.      Palpations: Abdomen is soft.      Tenderness: There is no abdominal tenderness.   Musculoskeletal:      Right lower leg: No edema.      Left lower leg: No edema.   Skin:     General: Skin is warm and dry.      Coloration: Skin is jaundiced.      Findings: No bruising.   Neurological:      Mental Status: He is alert and oriented to person, place, and time.       Significant Labs:  CBC:   Recent Labs   Lab 10/24/22  0836   WBC 17.42*   RBC 2.36*   HGB 8.2*   HCT 25.1*   *   *   MCH 34.7*   MCHC 32.7     CMP:   Recent Labs   Lab 10/24/22  0836   *   CALCIUM 9.0   ALBUMIN 3.5   PROT 5.9*   *   K 4.3   CO2 20*   CL 99   BUN 52*   CREATININE 2.6*   ALKPHOS 147*   ALT 26   AST 74*   BILITOT 13.2*     All labs within the past 24 hours have been reviewed.     Significant Imaging:

## 2022-10-24 NOTE — PROGRESS NOTES
"[facility] - Transplant Stepdown  Nephrology  Progress Note    Patient Name: Marshall Corona  MRN: [id number]  Admission Date: 10/22/2022  Hospital Length of Stay: 2 days  Attending Provider: Sandra Neal MD   Primary Care Physician: Primary Doctor No  Principal Problem:KATHE (acute kidney injury)    Subjective:     HPI: Marshall Corona is a 47-year-old male with hypertension, diabetes, alcoholic cirrhosis who presented to OSH with weakness and abdominal distention. Patient was subsequently transferred to [facility] for liver transplant evaluation.  During hospitalization at OSH patient noted to have right axillary abscess which was drained and he was subsequently started on clindamycin and transitioned to linezolid.  Additionally patient with worsening renal function during admission with creatinine of 1.4 on admit with subsequent worsening up to 3.8 at the time of transfer.  Outside records noting worsening renal function following paracentesis and associated hypotension for which he received albumin and was started on midodrine.  Nephrology was consulted for the management of KATHE.      Interval History: NAEON. Patient states he feels "the best I've felt in a long time." Reports improved abdominal pain. Pending repeat therapeutic paracentesis. Making small amounts of dark urine.     Review of Systems   Constitutional:  Negative for chills and fever.   HENT:  Negative for congestion and sore throat.    Respiratory:  Negative for cough and shortness of breath.    Cardiovascular:  Negative for chest pain and leg swelling.   Gastrointestinal:  Positive for abdominal pain. Negative for constipation, diarrhea, nausea and vomiting.   Musculoskeletal:  Negative for joint pain and myalgias.   Neurological:  Negative for dizziness and loss of consciousness.   Psychiatric/Behavioral:  Negative for depression.        Review of patient's allergies indicates:   Allergen Reactions    Codeine Itching, Anaphylaxis and Hives    " "Hydrocodone-acetaminophen      "Agitation"    Influenza virus vaccines Other (See Comments)    Oxycodone-acetaminophen      "Agitation"     Current Facility-Administered Medications   Medication Frequency    acetaminophen tablet 650 mg Q8H PRN    albumin human 25% bottle 50 g BID    cefTRIAXone (ROCEPHIN) 2 g/50 mL D5W IVPB Q24H    dextrose 10% bolus 125 mL PRN    dextrose 10% bolus 250 mL PRN    folic acid tablet 1,000 mcg Daily    Lactobacillus rhamnosus GG capsule 1 capsule Daily    lactulose 20 gram/30 mL solution Soln 10 g BID    miconazole NITRATE 2 % top powder BID    miconazole nitrate 2% ointment PRN    midodrine tablet 10 mg Q8H    octreotide injection 100 mcg Q8H    ondansetron injection 4 mg Q6H PRN    phytonadione vitamin k (AQUA-MEPHYTON) 5 mg in dextrose 5 % 50 mL IVPB Daily    pregabalin capsule 75 mg BID    prochlorperazine injection Soln 5 mg Q6H PRN    sodium bicarbonate tablet 1,300 mg TID    thiamine tablet 100 mg Daily    vancomycin - pharmacy to dose pharmacy to manage frequency       Objective:     Vital Signs (Most Recent):  Temp: 98.7 °F (37.1 °C) (10/24/22 0945)  Pulse: 102 (10/24/22 0945)  Resp: 18 (10/24/22 0945)  BP: 111/71 (10/24/22 0945)  SpO2: 98 % (10/24/22 0945)  O2 Device (Oxygen Therapy): room air (10/23/22 0706) Vital Signs (24h Range):  Temp:  [97.3 °F (36.3 °C)-98.7 °F (37.1 °C)] 98.7 °F (37.1 °C)  Pulse:  [] 102  Resp:  [16-18] 18  SpO2:  [95 %-99 %] 98 %  BP: ()/(45-84) 111/71     Weight: 103.5 kg (228 lb 2.8 oz) (10/22/22 2053)  Body mass index is 30.95 kg/m².  Body surface area is 2.29 meters squared.    I/O last 3 completed shifts:  In: 400 [P.O.:400]  Out: 265 [Urine:265]    Physical Exam  Constitutional:       Appearance: Normal appearance.   Eyes:      General: Scleral icterus present.      Conjunctiva/sclera: Conjunctivae normal.   Cardiovascular:      Rate and Rhythm: Normal rate and regular rhythm.      Pulses: Normal pulses.      " Heart sounds: Normal heart sounds.   Pulmonary:      Effort: Pulmonary effort is normal. No respiratory distress.      Breath sounds: Normal breath sounds.   Abdominal:      General: Bowel sounds are normal. There is distension.      Palpations: Abdomen is soft.      Tenderness: There is no abdominal tenderness.   Musculoskeletal:      Right lower leg: No edema.      Left lower leg: No edema.   Skin:     General: Skin is warm and dry.      Coloration: Skin is jaundiced.      Findings: No bruising.   Neurological:      Mental Status: He is alert and oriented to person, place, and time.       Significant Labs:  CBC:   Recent Labs   Lab 10/24/22  0836   WBC 17.42*   RBC 2.36*   HGB 8.2*   HCT 25.1*   *   *   MCH 34.7*   MCHC 32.7     CMP:   Recent Labs   Lab 10/24/22  0836   *   CALCIUM 9.0   ALBUMIN 3.5   PROT 5.9*   *   K 4.3   CO2 20*   CL 99   BUN 52*   CREATININE 2.6*   ALKPHOS 147*   ALT 26   AST 74*   BILITOT 13.2*     All labs within the past 24 hours have been reviewed.     Significant Imaging:       Assessment/Plan:     * KATHE (acute kidney injury)  Patient with baseline creatinine of 1.0 which worsened to creatinine of 1.4 at the time of admission to OSH and subsequently 3.8 [place] [facility]  Notably, creatinine 2.4 upon repeat so suspect 3.8 was erroneous. In the setting of cirrhosis, there was some concern for hepatorenal syndrome so patient was started on albumin and midodrine.  Urine sodium (<10) consistent with hepatorenal syndrome but patient with robust blood pressures so less likely.  Lower suspicion for abdominal compartment syndrome given recent paracentesis.  Bilirubin elevated.  Suspect either pre renal versus ATN v bilirubin tubulopathy.     Cr slowly increasing, 2.6 on 10/24. MAPs maintaining in 90s, though patient with one episode of hypotension to MAP 57 overnight.       Recommendations:   - lower suspicion but okay to continue HRS regimen at this time  - s/p  albumin protocol for volume expansion  - strict intake and output  - maintain map > 65  - renally dose medications and avoid nephrotoxins when possible      Hyponatremia  Patient with baseline hyponatremia but worsening to 128 at the time of consult.  Suspect likely related to underlying cirrhosis versus hypovolemic hyponatremia given history.     Na 129 on 10/24.     - daily BMP    Alcoholic cirrhosis  MELD-Na score: 33 at 10/24/2022  8:36 AM  MELD score: 31 at 10/24/2022  8:36 AM  Calculated from:  Serum Creatinine: 2.6 mg/dL at 10/24/2022  8:36 AM  Serum Sodium: 129 mmol/L at 10/24/2022  8:36 AM  Total Bilirubin: 13.2 mg/dL at 10/24/2022  8:36 AM  INR(ratio): 1.6 at 10/24/2022  8:36 AM  Age: 47 years     - per primary           Thank you for your consult. I will follow-up with patient. Please contact us if you have any additional questions.    Duy Hdz MD  Nephrology  [facility] - Transplant Stepdown

## 2022-10-24 NOTE — PLAN OF CARE
Recommendations    1. Rec Renal diet      2. Add Novasource renal BID to optimize nutrient intake     3. RD to monitor and follow      Goals: Meet % EEN, EPN by RD f/u  Nutrition Goal Status: new  Communication of RD Recs:  (POC)

## 2022-10-24 NOTE — PLAN OF CARE
Problem: Adult Inpatient Plan of Care  Goal: Plan of Care Review  Outcome: Ongoing, Progressing  Goal: Patient-Specific Goal (Individualized)  Outcome: Ongoing, Progressing  Goal: Absence of Hospital-Acquired Illness or Injury  Outcome: Ongoing, Progressing  Goal: Optimal Comfort and Wellbeing  Outcome: Ongoing, Progressing  Goal: Readiness for Transition of Care  Outcome: Ongoing, Progressing     Problem: Fluid and Electrolyte Imbalance (Acute Kidney Injury/Impairment)  Goal: Fluid and Electrolyte Balance  Outcome: Ongoing, Progressing     Problem: Renal Function Impairment (Acute Kidney Injury/Impairment)  Goal: Effective Renal Function  Outcome: Ongoing, Progressing     Problem: Impaired Wound Healing  Goal: Optimal Wound Healing  Outcome: Ongoing, Progressing

## 2022-10-24 NOTE — ASSESSMENT & PLAN NOTE
Patient with baseline creatinine of 1.0 which worsened to creatinine of 1.4 at the time of admission to OSH and subsequently 3.8 upon transfer to [facility].  Notably, creatinine 2.4 upon repeat so suspect 3.8 was erroneous. In the setting of cirrhosis, there was some concern for hepatorenal syndrome so patient was started on albumin and midodrine.  Urine sodium (<10) consistent with hepatorenal syndrome but patient with robust blood pressures so less likely.  Lower suspicion for abdominal compartment syndrome given recent paracentesis.  Bilirubin elevated.  Suspect either pre renal versus ATN v bilirubin tubulopathy.     Cr slowly increasing, 2.6 on 10/24. MAPs maintaining in 90s, though patient with one episode of hypotension to MAP 57 overnight.       Recommendations:   - lower suspicion but okay to continue HRS regimen at this time  - s/p albumin protocol for volume expansion  - strict intake and output  - maintain map > 65  - renally dose medications and avoid nephrotoxins when possible

## 2022-10-24 NOTE — ASSESSMENT & PLAN NOTE
MELD-Na score: 33 at 10/24/2022  8:36 AM  MELD score: 31 at 10/24/2022  8:36 AM  Calculated from:  Serum Creatinine: 2.6 mg/dL at 10/24/2022  8:36 AM  Serum Sodium: 129 mmol/L at 10/24/2022  8:36 AM  Total Bilirubin: 13.2 mg/dL at 10/24/2022  8:36 AM  INR(ratio): 1.6 at 10/24/2022  8:36 AM  Age: 47 years     - per primary

## 2022-10-24 NOTE — PROGRESS NOTES
Progress Note  Hospital Medicine      Patient Name: Marshall Corona  MRN: [id number]  Patient Class: IP- Inpatient     Admission Date: 10/15/2022  Length of Stay: 6 days  Attending Physician: Cecil Villalta MD  Primary Care Provider: Primary Doctor No    SUBJECTIVE:     Follow-up For:  KATHE (acute kidney injury)     Interval history/ROS:   10/23: admission for liver eval      HPI:  Briefly, is a 47-year-old male with history of hypertension, diabetes, presumed alcoholic cirrhosis who initially presents to the emergency department today with complaint of generalized weakness.  Patient reports he was recently admitted to outside facility with anemia and weakness.  He reports he was transfuse packed red blood cells at that time.  He also underwent paracentesis and EGD which was reportedly unrevealing although no records are available for review at this time.  He was discharged approximately 3 days ago and has reported continued weakness and near syncopal episode today.  He denies any current chest pain, shortness of breath, fevers, chills, nausea, vomiting, diarrhea, hematochezia or melanotic stools.  Patient was noted to have a hemoglobin of 7.2 in the emergency department.  The severity is severe nature subacute onset with no alleviating symptoms appreciated.        Overview/Hospital Course:  47-year-old male with history of hypertension, diabetes, presumed alcoholic cirrhosis initially presents to the emergency department with generalized weakness.  He was recently admitted to outside facility with anemia and weakness.  He was transfuse packed red blood cells at that facility and underwent paracentesis an EGD which was reportedly unrevealing.  Hemoglobin emergency department was noted to be 7.2.  Patient was admitted to the inpatient unit and transfuse 2 units packed red blood cells with consultation to Gastroenterology.  CT abdomen and right upper quadrant ultrasound consistent with cirrhosis.  GI has recommended  outpatient follow-up.  Patient was noted to have leukocytosis during his hospitalization with right axillary abscess.  He is placed on empiric broad-spectrum IV antibiotic coverage and underwent I&D by General surgery.  Renal function has worsened and thus nephrology consulted on 10/20.       OBJECTIVE:     Body mass index is 30.95 kg/m².    Vital Signs Range (Last 24H):  Temp:  [97.3 °F (36.3 °C)-98.7 °F (37.1 °C)]   Pulse:  []   Resp:  [16-18]   BP: ()/(45-84)   SpO2:  [95 %-100 %]     I & O (Last 24H):    Intake/Output Summary (Last 24 hours) at 10/24/2022 1326  Last data filed at 10/24/2022 1057  Gross per 24 hour   Intake 200 ml   Output 6400 ml   Net -6200 ml          Physical Exam:  Vitals and nursing note reviewed.   GEN: awake, alert, comfortable, NAD  EYES: PERRL. Icteric sclera.   ENT: OP clear, MMM.   CV: RRR, no murmur appreciated   PULM: CTAb, no wheezes, on RA  ABD: Normoactive bowel sounds. Abdomen is distended, mild TTP worse on left upper and lower  EXT: Symmetric, atraumatic, no LE edema.   SKIN: normal turgor, no rashes; right axillary abscess bandaged, dry  PSYCH: flat affect    Recent Labs   Lab 10/22/22  1050 10/22/22  1317 10/23/22  0619 10/24/22  0836   *  --  128* 129*   K 6.1* 4.5 4.3 4.3     --  99 99   CO2 14*  --  20* 20*   BUN 58*  --  53* 52*   CREATININE 3.80*  --  2.4* 2.6*   *  --  125* 135*   CALCIUM 7.6*  --  7.9* 9.0   MG  --   --  1.7 1.8   PHOS  --   --  4.2 5.1*       Recent Labs   Lab 10/22/22  1050 10/22/22  1535 10/23/22  0618 10/23/22  0619 10/24/22  0836   ALKPHOS 189*  --   --  171* 147*   ALT 32  --   --  25 26   AST 88*  --   --  54* 74*   ALBUMIN 2.5*  --   --  2.6* 3.5   PROT 6.2  --   --  5.4* 5.9*   BILITOT 10.2*  --   --  9.7* 13.2*   INR  --  1.6 1.7*  --  1.6*         Recent Labs   Lab 10/22/22  1535 10/23/22  0618 10/24/22  0836   WBC 21.07* 14.85* 17.42*   HGB 9.5* 8.3* 8.2*   HCT 28.5* 24.8* 25.1*   * 114* 132*   GRAN  80.1*  16.9* 74.3*  11.0* 73.8*  12.8*   LYMPH 8.1*  1.7 11.4*  1.7 11.8*  2.1   MONO 8.5  1.8* 10.0  1.5* 9.1  1.6*         Recent Labs   Lab 10/18/22  2124   POCTGLUCOSE 111*         No results for input(s): TROPONINI in the last 168 hours.    Diagnostic Results:  Labs: Reviewed    albumin human 25%, 25 g, Intravenous, BID  albumin human 25%, 50 g, Intravenous, BID  cefTRIAXone (ROCEPHIN) IVPB, 2 g, Intravenous, Q24H  folic acid, 1,000 mcg, Oral, Daily  Lactobacillus rhamnosus GG, 1 capsule, Oral, Daily  lactulose, 10 g, Oral, BID  miconazole NITRATE 2 %, , Topical (Top), BID  midodrine, 10 mg, Oral, Q8H  octreotide, 100 mcg, Subcutaneous, Q8H  phytonadione ((AQUA-MEPHYTON) IVPB, 5 mg, Intravenous, Daily  pregabalin, 75 mg, Oral, BID  sodium bicarbonate, 1,300 mg, Oral, TID  thiamine, 100 mg, Oral, Daily      As Needed acetaminophen, dextrose 10%, dextrose 10%, miconazole nitrate 2%, ondansetron, prochlorperazine, Pharmacy to dose Vancomycin consult **AND** vancomycin - pharmacy to dose    ASSESSMENT/PLAN:     Assessment: Marshall Corona is a 47 y.o. male here with:     Active Hospital Problems    Diagnosis  POA    *KATHE (acute kidney injury) [N17.9]  Yes    Alcohol dependence in remission [F10.21]  Yes     Chronic    Decompensated hepatic cirrhosis [K72.90, K74.60]  Yes    Abscess of right arm [L02.413]  Yes    Hyponatremia [E87.1]  Yes    Debility [R53.81]  Yes    Alcoholic cirrhosis [K70.30]  Yes    Benign essential HTN [I10]  Yes    Symptomatic anemia [D64.9]  Yes      Resolved Hospital Problems   No resolved problems to display.        Plan:     KATHE (acute kidney injury)  Appears to be consistent with hepatorenal syndrome  Worsening GFR with metabolic acidosis and hyperkalemia   Patient and sister, Rah, informed  Cont midodrine, albumin, octreotide        Alcoholic cirrhosis  New diagnosis cirrhosis, likely 2/2 heavy EtOH use  HCV negative  DF > 32, may benefit from steroids however concern for  active infection with WBC 19K  MELD = 35    MELD-Na score: 33 at 10/24/2022  8:36 AM  MELD score: 31 at 10/24/2022  8:36 AM  Calculated from:  Serum Creatinine: 2.6 mg/dL at 10/24/2022  8:36 AM  Serum Sodium: 129 mmol/L at 10/24/2022  8:36 AM  Total Bilirubin: 13.2 mg/dL at 10/24/2022  8:36 AM  INR(ratio): 1.6 at 10/24/2022  8:36 AM  Age: 47 years      - paracentesis, on rocephin  Thiamine  Vitamin K  Midodrine  Lactulose/rifaximine.     Symptomatic anemia  Hemoglobin improved post pRBC transfusion and has remained stable  No overt bleeding  Recent EGD at [facility]-- reportedly normal, will request records  Plan cscope as outpatient once medically optimized per GI     Hyponatremia  asymptomatic.  Now improved.     Abscess of right arm  Patient status post I&D on 10/18.    Cultures NGTD  WBC rising-- abx changed from clinda (compelted 3 days) to linezolid on 10/20  Add Rocephin      Debility  Continue aggressive PT/OT.  Work towards placement.     Thrombocytopenia  Likely secondary to underlying cirrhosis.  Monitor closely and transfuse as indicated.     Benign essential HTN  BP stable off of home antihypertensive regimen.        VTE Risk Mitigation (From admission, onward)           HIGH RISK CONDITION(S):  Patient has a condition that poses threat to life and bodily function: Acute Renal Failure        Sandra Neal MD

## 2022-10-24 NOTE — PT/OT/SLP EVAL
Physical Therapy Co-Evaluation and Treatment    Patient Name:  Marshall Corona   MRN:  [id number]    *co-treatment with OT  2/2 pt with potential impaired ability to tolerate 2 evaluations 2/2 medical status   Recommendations:     Discharge Recommendations:  rehabilitation facility   Discharge Equipment Recommendations: bedside commode   Barriers to discharge: Decreased caregiver support at current level of function     Assessment:     Marshall Corona is a 47 y.o. male admitted with a medical diagnosis of KATHE (acute kidney injury).  He presents with the following impairments/functional limitations:  weakness, impaired endurance, impaired self care skills, impaired functional mobility, gait instability, impaired balance. Pt tolerated activity with assistance required for OOB mobility. Pt presents with gait instability, evidence of deconditioning. Pt encouraged to perform OOB mobility with nursing daily to continue to improve strength and endurance. Pt currently presents below functional baseline, not safe to return home without 24/7 assistance. Upon discharge, pt would benefit from continued skilled therapy intervention at an inpatient rehabiltiation facility to progress toward more independent mobility. At this time, pt would continue to benefit from acute skilled therapy intervention to address deficits and progress toward prior level of function.       Rehab Prognosis: Good; patient would benefit from acute skilled PT services to address these deficits and reach maximum level of function.    Recent Surgery: * No surgery found *      Plan:     During this hospitalization, patient to be seen 4 x/week to address the identified rehab impairments via gait training, therapeutic activities, therapeutic exercises, neuromuscular re-education and progress toward the following goals:    Plan of Care Expires:  11/24/22    Subjective     Chief Complaint: Pt c/o fatigue   Patient/Family Comments/goals: to get better and return  home   Pain/Comfort:  Pain Rating 1:  (pt did not report)  Pain Rating Post-Intervention 1:  (pt did not report)    Patients cultural, spiritual, Religion conflicts given the current situation: no    Living Environment:  Pt lives alone in a 2 story condo with no ZAK, full flight to second floor where full bathroom is located. Pt reports plan to travel to [place] to live with his sister when medically stable to do so. She lives in a [facility] with no ZAK.   Prior to admission, patients level of function was ambulating with use of a RW, able to complete ADLs without assistance.  Equipment used at home: walker, rolling.  DME owned (not currently used): none.  Upon discharge, patient will have assistance from unknown.    Objective:     Communicated with RN prior to session.  Patient found supine with  (none)  upon PT entry to room.    General Precautions: Standard, fall   Orthopedic Precautions:N/A   Braces: N/A  Respiratory Status: Room air    Exams:  Cognitive Exam:  Patient is AAOx4, followed all commands, communicates clearly and fluently  Gross Motor Coordination:  WFL  RLE ROM: WFL  RLE Strength: WFL  LLE ROM: WFL  LLE Strength: WFL    Functional Mobility:  Bed Mobility:     Supine to Sit: contact guard assistance  Transfers:     Sit to Stand:  1x from EOB with minimum assistance, 1x from toilet with moderate assistance with grab bars and rolling walker   Gait: Pt ambulated 15 ft to restroom, then 18 ft to stretcher with RW and contact guard assistance. Pt demo'd small step size, decreased foot clearance, narrow JOSÉ MIGUEL, excessive sway. Facilitation provided for lateral weight shift to promote step initiation. Cuing provided for forward gaze and upright posture.       AM-PAC 6 CLICK MOBILITY  Total Score:16       Treatment & Education:  Pt required maximum assistance for hygiene and pericare after toileting. He maintained standing balance with contact guard assistance.   Pt educated on role of PT/POC. Pt verbalized  understanding.   Pt encouraged to only perform OOB mobility with assistance from nursing/therapy. Pt agreeable.   Pt encouraged to ambulate daily with assistance/supervision from nursing/therapy. Pt agreeable.      Patient left  on transport stretcher  with all lines intact,  and RN notified.    GOALS:   Multidisciplinary Problems       Physical Therapy Goals          Problem: Physical Therapy    Goal Priority Disciplines Outcome Goal Variances Interventions   Physical Therapy Goal     PT, PT/OT Ongoing, Progressing     Description: Goals to be met by: 2022     Patient will increase functional independence with mobility by performin. Supine to sit with Set-up [place]  2. Sit to stand transfer with supervision  3. Gait  x 150 feet with Supervision using Rolling Walker.   4. Ascend/descend 3 stair with bilateral Handrails Minimal Assistance using No Assistive Device.   5. Stand for 5 minutes with Modified [place] using Rolling Walker or no AD                         History:     Past Medical History:   Diagnosis Date    Alcohol abuse 10/23/2022    Allergy     Guillain-[place] syndrome     Hypertension     Peripheral venous insufficiency 2021    Pulmonary hypertension 8/3/2021    Thrombosis of left saphenous vein 2019    Uncomplicated alcohol dependence 10/28/2019    Vitamin B12 deficiency (non anemic) 2020       Past Surgical History:   Procedure Laterality Date    ARTHROSCOPY OF KNEE      Right knee scope    EYE SURGERY      bilateral lasic    INCISION AND DRAINAGE OF ABSCESS Right 10/18/2022    Procedure: INCISION AND DRAINAGE, ABSCESS-AXILLA;  Surgeon: Geovanni Lee MD;  Location: [facility];  Service: General;  Laterality: Right;       Time Tracking:     PT Received On: 10/24/22  PT Start Time: 1424     PT Stop Time: 1441  PT Total Time (min): 17 min     Billable Minutes: Evaluation 7 mins and Gait Training 10 mins      10/24/2022

## 2022-10-24 NOTE — CONSULTS
[facility]  DEPARTMENT OF PSYCHIATRY    ADDICTION CONSULT INITIAL EVALUATION   Pre-Transplant Evaluation    SITE: [facility] [address]    10/24/2022 1:08 PM  Marshall Corona  1975  [id number]    DATE OF ADMISSION: 10/22/2022  8:34 PM  LENGTH OF STAY: 2 days    EXAMINING PRACTITIONER: Chris Nowak    Inpatient consult to Psychiatry  Consult performed by: Chris Nowak MD  Consult ordered by: Sandra Neal MD      MCCONNELL:     I[]I Y = II[x][]II = Yes / Present / Present Though Denies / Endorses  I[]I N = II[][x]II = No / Absent / Absent Though Endorses / Denies  I[]I U = II[][]II = Unknown / Unable to Assess/Enact / Unwilling to Participate/Provide  I[]I A = II[x][x]II = Ambiguity / Uncertainty of Accuracy Exists  I[]I D = Denial or Minimization is Suspected/Evident  I[]I N/A = Non-Applicable    CHIEF COMPLAINT:     Patient Name: Marshall Corona  YOB: 1975  MRN: [id number]    Marshall Corona is a 47 y.o. male who is being seen by me for an initial psychiatric evaluation.  Marshall Corona presents with the chief complaint of: problematic substance use/abuse and alcohol and/or drug addiction    CHART REVIEW:     Available documentation has been reviewed, and pertinent elements of the chart have been incorporated into this evaluation where appropriate.    Per Primary Team:    47-year-old m with PMH HTN, GBS, diabetes, presumed alcoholic cirrhosis admitted to [facility] on 10/15 with weakness and abd distension.  Patient reports that his last drink was 2 months ago. On admission VS unremarkable.  PEx pos for scleral icterus and abd distension with no tenderness.  He was AOX3.  WBC 12.9, Hb 7.2, Plts 142, INR 1.6 > 1. Na 132, K 3.7, CO2 21, BUN 50, Cr 1.41 (BL 0.91), bilirubin 8.8, AST 68, ALT 37, HV A/B/C neg.  COVID neg. CT abd pos for cirrhosis with moderate volume ascites.  Also, 1.4 x 1 cm nonspecific hypoattenuating lesion in near the splenic hilum.  US abdomen pos for  cirrhotic liver. Stasis flow suggestive of portal HTN.  Moderate ascites. Transfer requested for liver XPL evaluation.  Transfer diagnosis acute liver failure, acute renal failure with concern for hepatorenal syndrome.      At bedside patient reports he feels ok.  Before paracentesis on 10/21, he's had one prior at a another hospital earlier this month where 4L was removed.  He feels after recent paracentesis he has increased abdominal swelling and pain, feels fluid has reaccumulated faster or he is more bloated.  Reports last alcohol use was a few months ago, states he quit prior to diagnosis of liver disease. 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 He uses smokeless tobacco Benedicta Dip. He denies any hx of Intra-nasal or injection illicit drug use.     The patient's last encounter in the psychiatry department was on: Visit date not found    PRESENTATION:     HISTORY OF PRESENT ILLNESS:             .    Marshall Corona is a 48 y/o male presenting for consultation on alcohol dependence and pre-liver transplant evaluation. He was first notified of his liver disease last week and was introduced to the idea of liver transplant 3-4 days ago. He has a 30 year history of drinking vodka preferably. His last drink was approximately 10 weeks ago. Usually he drinks about 7-8 standard drinks a day, 5 days a week. States he goes through phases of drinking on and off, and longest period of sobriety was about 2 years. His drinking frequency increased after being diagnosed with Guillain-[place] syndrome in 2020, as he had lasting disability and felt that he had nothing he could do besides walk and go to his friend's bar to drink. For about 1 month he smoked marijuana to  aid with pain while he had Guillain-Nog[place], but has not used it again since 2020. He also admits to using Dickey Dip smokeless tobacco on and off for 25 years, which he last used 10 weeks ago. He has never tried rehab in the past for his substance use. However, he is very motivated to maintain a lifetime of sobriety and attend rehab. Currently he feels no cravings or desire to use any substances. Pt intends to move to [place] with his sister once medically stable.          .  COLLATERAL:     Patient information was obtained from patient, relative(s), and past medical records.    Also present with the patient at the time of the evaluation: parent(s) and relative(s).    Ofelia Rashid (Sister) [phone]              .  ADDICTION:     SUBSTANCE USE:     I[]I Patient denies any substance use history, and none is known.  I[]I Patient unable or unwilling to provide any substance use history.    I[x]I Y  I[]I N  I[]I U  I[]I Current  I[]I Former  Nicotine Use: Copehagen Dip, last used 10 weeks ago. On and off for 25 years.   I[x]I Y  I[]I N  I[]I U  I[]I Current  I[]I Former  Alcohol Misuse/Abuse: for about 30 years. Usually vodka ans sprite, last drank 10 weeks ago.  I[x]I Y  I[]I N  I[]I U  I[]I Current  I[x]I Former  Illicit Drug Use/Misuse/Abuse: smoked marijuana for about 1 month for pain relieve. Last used in 2020  I[]I Y  I[x]I N  I[]I U  I[]I Current  I[]I Former  Misuse/Abuse of Rx Medications:     I[]I Y  I[x]I N  I[]I U  Hx of Detox:   I[]I Y  I[x]I N  I[]I U  Hx of Rehab:     Current Alcohol Use:   I[]I U    I[x]I N    I[]I Rare    I[]I Social    I[]I Exceeds Recommended Health Limits    I[]I Binge Pattern    I[]I Daily or Near Daily    I[]I Physiologically Dependent    I[]I N/A  I[]I U  Average Consumption (e.g. type, quantity, frequency): Last drank 10 weeks ago. vodka and sprite, 7-8 standard drinks at the most, at least 5 days in a week.   I[]I N/A  I[]I U  Last drink: 10 weeks ago    Substances Used  "(Lifetime):   I[]I U    I[]I N    II[x][]II Cannabis    II[][]II Cocaine    II[][]II Heroin   II[][]II Opioids    II[][]II Methamphetamine    II[][]II Stimulants    II[][]II Benzodiazepines    I[]I Other:     Tobacco Cessation ("Wants to Quit"):   I[]I N/A  I[]I U  II[x][]II Interested in Quitting    II[][]II Uninterested in Quitting   II[x][]II Making Efforts to Cut Back or Quit    II[][]II Advised to Quit    II[][]II Assistance Provided    II[x][]II Encouragement Provided    II[][]II Motivational Interviewing Provided    II[][]II Resources Provided    II[][]II Referral Made     I[]I N/A  I[x]I Y  I[]I N  I[]I U  Meets Criteria for Substance Use Disorder: severe  I[]I N/A  I[x]I Y  I[]I N  I[]I U  Advised to Quit/Cut Back:   I[]I N/A  I[x]I Y  I[]I N  I[]I U  Motivated to Do So:     ADDITIONAL FACTORS RELATED TO ADDICTION:     I[]I Y  I[x]I N  I[]I U  Hx of IVDU:   I[]I Y  I[x]I N  I[]I U  Hx of Accidental Overdose:   I[]I Y  I[x]I N  I[]I U  Hx of DUI:   I[]I Y  I[x]I N  I[]I U  Hx of Complicated Withdrawal (i.e. Seizures and/or Delirium Tremens):   I[]I Y  I[x]I N  I[]I U  Hx of Known/Suspected Substance-Induced Psychiatric Disorder:   I[]I Y  I[x]I N  I[]I U  Hx of Medication Assisted Treatment:   I[]I Y  I[x]I N  I[]I U  Hx of Twelve Step Program (or Equivalent) Involvement:   I[]I Y  I[x]I N  I[]I U  Currently Exhibits Signs of Intoxication:   I[]I Y  I[x]I N  I[]I U  Currently Exhibits Signs of Withdrawal:   I[]I Y  I[x]I N  I[]I U  Currently Active in Recovery:     Substance(s) of Choice: Alcohol, smokeless tobacco  Substances Used Currently/Recently: Alcohol, smokeless tobacco last used 10 weeks ago  Duration of Sobriety/Abstinence: longest 2 years  Date of Last Use of Substances: 10 weeks ago  View/Acceptance of Twelve Step (or Equivalent) Programs: Very enthusiastic to go to rehab.  Social Support: Mother and other family members are very supportive  Spouse/Partner Consumption: no     I[]I N/A  I[x]I Y  " "I[]I N  I[]I U  I[]I A  Awareness of Biomedical Complications:   I[]I N/A  I[x]I Y  I[]I N  I[]I U  I[]I A  Understands Need for Lifetime Sobriety:   I[]I N/A  I[x]I Y  I[]I N  I[]I U  I[]I A  Acknowledges/Accepts Addiction:   I[]I N/A  I[x]I Y  I[]I N  I[]I U  I[]I A  Cessation of Problematic Alcohol/Drug Use ("Wants to Quit"): Patient Successfully Quit  I[]I N/A  I[x]I Y  I[]I N  I[]I U  I[]I A  Motivation to Pursue Treatment: Enthusiastic, Agreeable      ADDITIONAL CONSIDERATIONS FOR TRANSPLANT:     Date First Informed of Impending Organ Failure: last week  Continued to Drink/Use Despite Knowledge of Organ Damage: No  When Was the Subject of Transplantation First Broached: 3-4 days ago  Patient Made the Following Lifestyle Adjustments and How: No   Does the Patient Accept/Understand the Connection Between Substance Use and Subsequent Organ Failure: Yes  Understands Need for Lifetime Medication: Yes  Understands Need for Lifetime Sobriety: Yes      REVIEW OF SYSTEMS:     MEDICAL ROS:     Complete review of systems performed covering Constitutional, Eyes, ENT/Mouth, Cardiovascular, Respiratory, Gastrointestinal, Genitourinary, Musculoskeletal, Skin, Neurologic, Endocrine, Heme/Lymph, and Allergy/Immune.     Complete review of systems was negative with the exception of the following positive symptoms: "abdominal pressure," but patient denies any other physical complaints at this time    PSYCHIATRIC ROS:     I[]I Patient denies any pertinent Psychiatric ROS, and none is known.  I[]I Patient unable or unwilling to provide any Psychiatric ROS.    II[x][]II sleep disturbance: **  Positive for: insomnia (chronic)   II[x][]II appetite/weight change: **  abdominal pressure from ascites causing decreased appetite  II[x][]II fatigue/anergia: **  falls asleep watching tv during the day, naps  II[][x]II impairment in focus/concentration: **    II[][x]II depression: **     II[][x]II anxiety/worry: **     II[][x]II dysregulated " mood/behavior: **     II[][x]II manic symptomatology: **     II[][x]II psychosis: **         HISTORY:     I[]I Patient unable or unwilling to provide any history.  Information Obtained Via Collateral/Chart Review, Yet To Be Documented, If Available:     PAST PSYCHIATRIC:     I[x]I Patient denies any past psychiatric history, and none is known.  I[]I Patient unable or unwilling to provide any past psychiatric history.    I[]I Y  I[x]I N  I[]I U  Psychiatric Diagnoses:   I[]I Y  I[x]I N  I[]I U  Current Psychiatric Provider (if Applicable):   I[]I Y  I[x]I N  I[]I U  Hx of Psychiatric Hospitalization:   I[]I Y  I[x]I N  I[]I U  Hx of Outpatient Psychiatric Treatment (psychiatry/psychotherapy):   I[]I Y  I[x]I N  I[]I U  Psychotropic Trials:   I[]I Y  I[x]I N  I[]I U  Prior Suicide Attempts:   I[]I Y  I[x]I N  I[]I U  Hx of Suicidal Ideation:   I[]I Y  I[x]I N  I[]I U  Hx of Homicidal Ideation:   I[]I Y  I[x]I N  I[]I U  Hx of Self-Injurious Behavior (Non-Suicidal):   I[]I Y  I[]I N  I[]I U  Hx of Violence:   I[]I Y  I[x]I N  I[]I U  Documented Hx of Malingering:       TRAUMA:     I[x]I Patient denies any history of trauma, abuse or neglect, and none is known.  I[]I Patient unable or unwilling to provide any history of trauma, abuse, or neglect.    I[]I Y  I[x]I N  I[]I U  Hx of Trauma/Neglect:   I[]I Y  I[x]I N  I[]I U  Hx of Physical Abuse:   I[]I Y  I[x]I N  I[]I U  Hx of Sexual Abuse:     Additional Relevant Trauma History, As Applicable:       FAMILY:     I[]I Y  I[]I N  I[x]I U          SOCIAL:     I[]I Patient unable or unwilling to provide any social history.    II[][x]II Grew Up Locally?: [place]  II[x][]II Happy Childhood?:   II[][x]II Significant Developmental Delay/Disability?:   II[x][]II GED/High School Dipoloma?:   II[x][]II Post High School Education?: Couple years of college classes on and off as instructed by employers.   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  II[][x]II On or Applying for Disability?: denied, on medicaid  II[x][]II Functions Independently?: yes, but some difficulty with ADLs  II[x][]II Financially Stable?:   II[x][]II Domiciled?:   II[x][]II Lives Alone?:   II[x][]II Heterosexual/Cisgender?:   II[x][]II Currently in a Romantic Relationship?:   II[x][]II Ever ?: twice  II[x][]II Children/Dependents?: 2 boys, oldest is 27, other is 20. Both in [place]  II[][x]II Mandaen/Spiritual?:   II[][x]II  History?:   II[x][]II Engaged in Hobbies/Recreational Activities?: DIY projects and cooking  II[x][]II Access to a Gun?: gun is currently at friend's house    Additional Relevant Social History, As Applicable:       LEGAL:     I[]I Patient denies any legal history, and none is known.  I[]I Patient unable or unwilling to provide any legal history.    I[]I Y  I[x]I N  I[]I U  Current Legal Issues:   I[]I Y  I[x]I N  I[]I U  Past Charges/Convictions:   I[]I Y  I[x]I N  I[]I U  Hx of Incarceration:     Additional Relevant Past Psychiatric History, As Applicable:       MEDICAL:     The patient's past medical history has been reviewed and updated as appropriate within the electronic medical record system.    General Medical History:     I[]I N  I[]I U        Patient Active Problem List   Diagnosis    Symptomatic anemia    Benign essential HTN    Alcoholic cirrhosis    Thrombocytopenia    Debility    Abscess of right arm    Hyponatremia    KATHE (acute kidney injury)    Guillain-[place] syndrome    Idiopathic peripheral neuropathy    Orthostatic hypotension    Peripheral neuropathic pain    Peripheral venous insufficiency    Pulmonary hypertension    Tachycardia    Thrombosis of left saphenous vein    Uncomplicated alcohol dependence    Vitamin B12 deficiency (non anemic)    Alcohol dependence in remission    Decompensated hepatic cirrhosis     Past Medical History:  10/23/2022: Alcohol abuse  No date: Allergy  No date:  Guillain-[place] syndrome  No date: Hypertension  6/21/2021: Peripheral venous insufficiency  8/3/2021: Pulmonary hypertension  11/26/2019: Thrombosis of left saphenous vein  10/28/2019: Uncomplicated alcohol dependence  4/30/2020: Vitamin B12 deficiency (non anemic)    NEUROLOGIC:     I[]I Y  I[]I N  I[]I U  Hx of Seizure:   I[]I Y  I[]I N  I[]I U  Hx of Significant Head Trauma (e.g., Loss of Consciousness, Concussion, Coma):      Additional Relevant Neurologic History, As Applicable:       MEDICATIONS:     Current Psychotropic Medications:     I[x]I N  I[]I U         Scheduled and PRN Medications:   The electronic chart was reviewed and updated as appropriate.  See [employer] for details.    Current Facility-Administered Medications:     acetaminophen tablet 650 mg, 650 mg, Oral, Q8H PRN, Jose Quijano MD    albumin human 25% bottle 25 g, 25 g, Intravenous, BID, Sandra Neal MD, Stopped at 10/24/22 1233    albumin human 25% bottle 50 g, 50 g, Intravenous, BID, Jose Quijano MD, Stopped at 10/23/22 2142    cefTRIAXone (ROCEPHIN) 2 g/50 mL D5W IVPB, 2 g, Intravenous, Q24H, Jose Quijano MD, Stopped at 10/24/22 1318    dextrose 10% bolus 125 mL, 12.5 g, Intravenous, PRN, Jose Quijano MD    dextrose 10% bolus 250 mL, 25 g, Intravenous, PRN, Jose Quijano MD    folic acid tablet 1,000 mcg, 1,000 mcg, Oral, Daily, Jose Quijano MD, 1,000 mcg at 10/24/22 0931    Lactobacillus rhamnosus GG capsule 1 capsule, 1 capsule, Oral, Daily, Jose Quijano MD, 1 capsule at 10/24/22 0931    lactulose 20 gram/30 mL solution Soln 10 g, 10 g, Oral, BID, Jose Quijano MD, 10 g at 10/24/22 0932    miconazole NITRATE 2 % top powder, , Topical (Top), BID, Jose Quijano MD, Given at 10/24/22 1132    miconazole nitrate 2% ointment, , Topical (Top), PRN, Jose Quijano MD    midodrine tablet 10 mg, 10 mg, Oral, Q8H, Jose Quijano MD, 10 mg at 10/24/22 0534    octreotide injection 100 mcg, 100 mcg,  "Subcutaneous, Q8H, Sandra Neal MD, 100 mcg at 10/24/22 0535    ondansetron injection 4 mg, 4 mg, Intravenous, Q6H PRN, Jose Quijano MD    phytonadione vitamin k (AQUA-MEPHYTON) 5 mg in dextrose 5 % 50 mL IVPB, 5 mg, Intravenous, Daily, Jose Quijano MD, Stopped at 10/24/22 1233    pregabalin capsule 75 mg, 75 mg, Oral, BID, Sandra Neal MD, 75 mg at 10/24/22 0931    prochlorperazine injection Soln 5 mg, 5 mg, Intravenous, Q6H PRN, Jose Quijano MD    sodium bicarbonate tablet 1,300 mg, 1,300 mg, Oral, TID, Jose Quijano MD, 1,300 mg at 10/24/22 0931    thiamine tablet 100 mg, 100 mg, Oral, Daily, Jose Quijano MD, 100 mg at 10/24/22 0931    Pharmacy to dose Vancomycin consult, , , Once **AND** vancomycin - pharmacy to dose, , Intravenous, pharmacy to manage frequency, Jose Quijano MD    ALLERGIES:     Review of patient's allergies indicates:   Allergen Reactions    Codeine Itching, Anaphylaxis and Hives    Hydrocodone-acetaminophen      "Agitation"    Influenza virus vaccines Other (See Comments)    Oxycodone-acetaminophen      "Agitation"       RISK:     RELIABILITY, ACCURACY & AGENCY:     The patient is deemed to be a reliable and factually accurate historian.    Inconsistencies between patient reporting, collateral information, and/or chart review are absent.    Legal Status: Non Applicable    PRESCRIPTION DRUG MONITORING:     LA/MS  AWARE  Site reviewed - No recent discrepancies or irregularities are noted.      FIREARMS:     Access to Firearms:   See above for screening on access to firearms.  NOTE: patient counseled on gun safety.  NOTE: patient counseled on increased risks associated with gun ownership.      III[x]III  RISK PARAMETERS:     The following risk parameters were assessed during this evaluation:    I[]I Y  I[x]I N  I[]I U  I[]I A  Suicidal Ideation/Behavior: **   I[]I Y  I[x]I N  I[]I U  I[]I A  Homicidal Ideation/Behavior: **  I[]I Y  I[x]I N  I[]I U  I[]I A " " Violence: **  I[]I Y  I[x]I N  I[]I U  I[]I A  Self-Injurious Behavior: **    I[]I Y  I[x]I N  I[]I U  I[]I A  I[]I N/A  Minimization of Symptoms Suspected/Evident: **  I[]I Y  I[x]I N  I[]I U  I[]I A  I[]I N/A  Exaggeration of Symptoms Suspected/Evident: **      III[x]III  CLINICAL RISK ASSESSMENT:     Currently does not meet or exceed the threshold for psychiatric hospitalization, as the patient can be managed safely and successfully in a less restrictive level of care or the community.    III[x]III  CLINICAL RISK DETERMINATION:     The following criteria were met for involuntary psychiatric admission:   I[x]I None    I[]I Dangerous to Self    I[]I Dangerous to Others    I[]I Gravely Disabled      EXAMINATION:     VITALS:     Vitals:    10/24/22 0733 10/24/22 0945 10/24/22 1059 10/24/22 1207   BP: 122/78 111/71 116/70 113/68   BP Location:  Left arm Left arm    Patient Position:  Lying Lying Lying   Pulse: 100 102 98 100   Resp: 17 18 18 18   Temp: 98 °F (36.7 °C) 98.7 °F (37.1 °C)  97.3 °F (36.3 °C)   TempSrc:  Temporal  Oral   SpO2: 96% 98% 98% 100%   Weight:       Height:           MENTAL STATUS EXAMINATION:     General Appearance: **  adequately groomed, appropriately dressed, in no apparent distress  Behavior: **  cooperative, under good behavioral control, friendly  Involuntary Movements and Motor Activity: **  no abnormal involuntary movements noted, no psychomotor agitation or retardation  Gait and Station: **  unable to assess - patient lying down or seated  Speech: **  intact; normal rate, rhythm, volume, tone and pitch; conversational, spontaneous, and coherent  Language: **  fluent, speaks and understands English proficiently  Mood: "pretty good"  Affect: **  normal, euthymic, reactive, full-range, mood-congruent, appropriate to situation and context  Thought Process: **  intact; linear, goal-directed, organized, logical  Associations: **  intact, no loosening of associations  Thought Content and " Perceptions: **  no suicidal or homicidal ideation, no auditory or visual hallucinations, no paranoid ideation, no ideas of reference, no evidence of delusions or psychosis, no auditory or visual hallucinations, no paranoid ideation, no ideas of reference, no evidence of delusions or psychosis  Sensorium and Orientation: **  alert and oriented, with clear sensorium  Recent and Remote Memory: **  grossly intact, no significant impairments noted  Attention and Concentration: **  attentive, not readily distractible  Fund of Knowledge: **  grossly intact, used appropriate vocabulary, no significant deficits noted  Insight: **  intact, demonstrates awareness of illness  Judgment: **  intact, behavior is adequate/appropriate given the circumstances      ASSESSMENT:     A diagnostic psychiatric evaluation was performed and responsiveness to treatment was assessed.  The patient demonstrates adequate ability/capacity to respond to treatment.    PSYCHOSOCIAL FACTORS  Stressors (Biopsychosocial, Cultural and Environmental): physical health    STRENGTHS AND LIABILITIES   Strength: Patient has positive support network.  Liability: Patient has poor health.    III[x]III  INITIAL DIAGNOSES AND PROBLEMS:     Alcohol use disorder, severe      PLAN:     TRANSPLANT SUITABILITY:     From a psychiatric perspective, this patient presents as a modifiably high risk for transplant. Patient has been abstinent from alcohol for approximately 10 weeks now. He has strong social support in the form of his mother and his sister's family. He plans to move to [place] to live with them. Additionally, patient is interested in attending rehab/community group meetings upon medical stabilization and discharge from the hospital. Of note, patient has never participated in a rehab program before and is highly motivated for change. He has no psychiatric history or previous behavior concerning for self harm.     IMPRESSION AND RECOMMENDATIONS:     MANAGEMENT  PLAN, TREATMENT GOALS, THERAPEUTIC TECHNIQUES/APPROACHES & CLINICAL REASONING      Disposition:    Once medically cleared;   - No need for inpatient psychiatric hospitalization, disposition per primary treatment team.  - Patient counseled on abstinence from alcohol and substances of abuse (illicit and prescription).  - Counseled on full engagement in 12 step (or equivalent) recovery program(s), including acquisition of a sponsor.  - Recommend patient enroll in addiction rehab program after discharge and medical stabilization.  - Addiction resource sheet provided to patient.  - Relapse prevention and motivational interviewing provided.    In cases of emergency, daily coverage provided by Acute/ER Psych MD, NP, PA, or SW, with contact numbers located in [facility] On Call Schedule.    III[x]III  PRESCRIPTION DRUG MANAGEMENT:     Prescription Drug Management entails the review, recommendation, or consideration without recommendation of medications, and as such was employed during the encounter.    Discussed, to the extent possible, diagnosis, risks and benefits of proposed treatment vs alternative treatments vs no treatment, potential side effects of these treatments and the inherent unpredictability of treatment. The patient expresses understanding of the above and displays the capacity to agree with this treatment given said understanding. Patient also agrees that, currently, the benefits outweigh the risks and would like to pursue treatment at this time.     ADDICTION COUNSELING AND MANAGEMENT:     Timely and targeted counseling is an important intervention in the treatment of substance use disorders.  Active and ongoing management is a hallmark of good clinical care.    Addiction counseling and management WAS employed during this encounter.    [x] The patient was counseled on abstinence from alcohol and substances of abuse (illicit and prescription).  [x] Harm reduction techniques were discussed, as  warranted, to mitigate risk from problematic behaviors.  [x] Serial alcohol and drug laboratory testing (e.g. PETH, urine toxicology) is recommended to provide accountability, as well as to guide and refine substance abuse treatment moving forward.  [x] Relapse prevention and motivational interviewing was provided.  [x] Education was provided on 12 step recovery programs.      Written material, if applicable, has additionally been provided, via the AVS or other pre-printed handouts.    In cases of emergency, daily coverage provided by Acute/ER Psych MD, NP, or SW, with contact numbers located in [facility] On Call Schedule    Case discussed with staff addiction psychiatrist: Dr. Meghan Nowak MD  [facility], PGY-2    Psychiatry will sign off. Thank you for allowing us to participate in the care of this patient.      DATA:     DIAGNOSTIC TESTING:     The chart was reviewed for recent diagnostic investigations, and pertinent results are noted below.      PERTINENT LABORATORY RESULTS:     Blood Counts, Electrolytes & Glucose: (i.e. WBC, ANC, Hemoglobin, Hematocrit, MCV, Platelets)  Lab Results   Component Value Date    WBC 17.42 (H) 10/24/2022    GRAN 12.8 (H) 10/24/2022    GRAN 73.8 (H) 10/24/2022    HGB 8.2 (L) 10/24/2022    HCT 25.1 (L) 10/24/2022     (H) 10/24/2022     (L) 10/24/2022     (L) 10/24/2022    K 4.3 10/24/2022    CALCIUM 9.0 10/24/2022    PHOS 5.1 (H) 10/24/2022    MG 1.8 10/24/2022    CO2 20 (L) 10/24/2022    ANIONGAP 10 10/24/2022     (H) 10/24/2022    HGBA1C 4.9 10/15/2022       Renal, Liver, Pancreas, Thyroid, Parathyroid, Prolactin, CPK, Lipids & Vitamin Levels: (i.e. Cr, BUN, Anion Gap, GFR, Urine Specific Gravity, Urine Protein, Microalburnin, AST, ALT, GGT, Alk Phos,Total Bili, Total Protein, Albumin, Ammonia, INR, Amylase, Lipase, TSH, Total T3, Total T4, Free T4 PTH, Prolactin, CPK, Cholesterol, Triglycerides, LDH, HDL, Vitamin  B12, Folate, Vitamin D)  Lab Results   Component Value Date    CREATININE 2.6 (H) 10/24/2022    BUN 52 (H) 10/24/2022    EGFRNORACEVR 29.3 (A) 10/24/2022    SPECGRAV 1.020 10/22/2022    PROTEINUA Negative 10/22/2022    AST 74 (H) 10/24/2022    ALT 26 10/24/2022    ALKPHOS 147 (H) 10/24/2022    BILITOT 13.2 (H) 10/24/2022    LABPROT 16.3 (H) 10/24/2022    ALBUMIN 3.5 10/24/2022    AMMONIA 54 (H) 10/23/2022    INR 1.6 (H) 10/24/2022    CPK 13 (L) 10/23/2022    CHOL 230 06/17/2015    TRIG 236 (H) 06/17/2015    LDLCALC 129 06/17/2015    HDL 54 06/17/2015    [id number] 921 10/15/2022    FOLATE 2.7 (L) 10/15/2022       Infection Diseases, Pregnancy Screenings & Drug Levels: (i.e. Hepatitis Panel, HIV, Syphilis, Urine & Blood Pregnancy Screens, beta hCG,Lithium, Valproic Acid, Carbamazepine, Lamotrigine, Phenytoin, Phenobarbital, Clozapine, Norclozapine, Clozapine + Norclozapine)   Lab Results   Component Value Date    HEPAIGM Negative 10/15/2022    HEPBIGM Negative 10/15/2022    HEPCAB Negative 10/15/2022       Addiction: (i.e. Urine Toxicology, Blood Alcohol, PETH, EtG, EtS, CDT, Buprenorphine, Norbuprenorphine)  No results found for: PCDSOALCOHOL, PCDSOBENZOD, BARBITURATES, PCDSCOMETHA, OPIATESCREEN, COCAINEMETAB, AMPHETAMINES, MARIJUANATHC, PCDSOPHENCYN, PCDSUBUPRE, PCDSUFENTANY, PCDSUOXYCOD, PCDSUTRAMA, ALCOHOLMEDIC, PETH, [id number], THEYLGLUCU, ETHYLSULF, CDT, BUPRENORPH, NORBUPRENOR    CARDIAC:     Results for orders placed or performed during the hospital encounter of 10/15/22   EKG 12-lead    Collection Time: 10/16/22  8:16 AM    Narrative    Test Reason : R07.9,    Vent. Rate : 109 BPM     Atrial Rate : 109 BPM     P-R Int : 160 ms          QRS Dur : 074 ms      QT Int : 364 ms       P-R-T Axes : 056 -06 -08 degrees     QTc Int : 490 ms    Sinus tachycardia  Inferior infarct ,age undetermined  Abnormal ECG  When compared with ECG of 16-OCT-2022 08:15,  No significant change was found  Confirmed by Atilio PEREZ,  Kashmir SALDIVAR ([id number]) on 10/17/2022 8:56:00 AM    Referred By: KENYA   SELF           Confirmed By:Kashmir Trimble MD       NEUROLOGIC:     No results found for this or any previous visit.

## 2022-10-24 NOTE — ASSESSMENT & PLAN NOTE
Patient with baseline hyponatremia but worsening to 128 at the time of consult.  Suspect likely related to underlying cirrhosis versus hypovolemic hyponatremia given history.     Na 129 on 10/24.     - daily BMP

## 2022-10-24 NOTE — PLAN OF CARE
AAOx4- lactulose continued, VSS  Liver workup continued-- plan for echo today 10/24 + addiction psych consulted; PETH pending  plan for para this AM to rule out SBP  PERLA axillae dressing changed overnight, per orders; Wound care consulted, afebrile overnight; 1x IV vanc done  Nephrology + hepatology following   PT/OT consulted; pt deconditioned.   Non skid socks worn, call light within reach, will cont to monitor

## 2022-10-24 NOTE — PROGRESS NOTES
Progress Note  Hospital Medicine      Patient Name: Marshall Corona  MRN: [id number]  Patient Class: IP- Inpatient     Admission Date: 10/15/2022  Length of Stay: 6 days  Attending Physician: Cecil Villalta MD  Primary Care Provider: Primary Doctor No    SUBJECTIVE:     Follow-up For:  KATHE (acute kidney injury)     Interval history/ROS:   1024: to paracentesis today. Family in from [place]. We discussed concerns related to transplant candidacy - short period of sobriety, minimal social support and progressive debility.     HPI:  Briefly, is a 47-year-old male with history of hypertension, diabetes, presumed alcoholic cirrhosis who initially presents to the emergency department today with complaint of generalized weakness.  Patient reports he was recently admitted to outside facility with anemia and weakness.  He reports he was transfuse packed red blood cells at that time.  He also underwent paracentesis and EGD which was reportedly unrevealing although no records are available for review at this time.  He was discharged approximately 3 days ago and has reported continued weakness and near syncopal episode today.  He denies any current chest pain, shortness of breath, fevers, chills, nausea, vomiting, diarrhea, hematochezia or melanotic stools.  Patient was noted to have a hemoglobin of 7.2 in the emergency department.  The severity is severe nature subacute onset with no alleviating symptoms appreciated.        Overview/Hospital Course:  47-year-old male with history of hypertension, diabetes, presumed alcoholic cirrhosis initially presents to the emergency department with generalized weakness.  He was recently admitted to outside facility with anemia and weakness.  He was transfuse packed red blood cells at that facility and underwent paracentesis an EGD which was reportedly unrevealing.  Hemoglobin emergency department was noted to be 7.2.  Patient was admitted to the inpatient unit and transfuse 2 units packed  red blood cells with consultation to Gastroenterology.  CT abdomen and right upper quadrant ultrasound consistent with cirrhosis.  GI has recommended outpatient follow-up.  Patient was noted to have leukocytosis during his hospitalization with right axillary abscess.  He is placed on empiric broad-spectrum IV antibiotic coverage and underwent I&D by General surgery.  Renal function has worsened and thus nephrology consulted on 10/20.       OBJECTIVE:     Body mass index is 30.95 kg/m².    Vital Signs Range (Last 24H):  Temp:  [97.3 °F (36.3 °C)-98.7 °F (37.1 °C)]   Pulse:  []   Resp:  [16-18]   BP: ()/(45-84)   SpO2:  [95 %-100 %]     I & O (Last 24H):    Intake/Output Summary (Last 24 hours) at 10/24/2022 1324  Last data filed at 10/24/2022 1057  Gross per 24 hour   Intake 200 ml   Output 6400 ml   Net -6200 ml        Physical Exam:  Vitals and nursing note reviewed.   GEN: awake, alert, comfortable, NAD  EYES: PERRL. Icteric sclera.   ENT: OP clear, MMM.   CV: RRR, no murmur appreciated   PULM: CTAb, no wheezes, on RA  ABD: Normoactive bowel sounds. Abdomen is distended, mild TTP worse on left upper and lower  EXT: Symmetric, atraumatic, no LE edema.   SKIN: normal turgor, no rashes; right axillary abscess bandaged, dry  PSYCH: flat affect    Recent Labs   Lab 10/22/22  1050 10/22/22  1317 10/23/22  0619 10/24/22  0836   *  --  128* 129*   K 6.1* 4.5 4.3 4.3     --  99 99   CO2 14*  --  20* 20*   BUN 58*  --  53* 52*   CREATININE 3.80*  --  2.4* 2.6*   *  --  125* 135*   CALCIUM 7.6*  --  7.9* 9.0   MG  --   --  1.7 1.8   PHOS  --   --  4.2 5.1*     Recent Labs   Lab 10/22/22  1050 10/22/22  1535 10/23/22  0618 10/23/22  0619 10/24/22  0836   ALKPHOS 189*  --   --  171* 147*   ALT 32  --   --  25 26   AST 88*  --   --  54* 74*   ALBUMIN 2.5*  --   --  2.6* 3.5   PROT 6.2  --   --  5.4* 5.9*   BILITOT 10.2*  --   --  9.7* 13.2*   INR  --  1.6 1.7*  --  1.6*       Recent Labs   Lab  10/22/22  1535 10/23/22  0618 10/24/22  0836   WBC 21.07* 14.85* 17.42*   HGB 9.5* 8.3* 8.2*   HCT 28.5* 24.8* 25.1*   * 114* 132*   GRAN 80.1*  16.9* 74.3*  11.0* 73.8*  12.8*   LYMPH 8.1*  1.7 11.4*  1.7 11.8*  2.1   MONO 8.5  1.8* 10.0  1.5* 9.1  1.6*       Recent Labs   Lab 10/18/22  2124   POCTGLUCOSE 111*       No results for input(s): TROPONINI in the last 168 hours.    Diagnostic Results:  Labs: Reviewed    albumin human 25%, 25 g, Intravenous, BID  albumin human 25%, 50 g, Intravenous, BID  cefTRIAXone (ROCEPHIN) IVPB, 2 g, Intravenous, Q24H  folic acid, 1,000 mcg, Oral, Daily  Lactobacillus rhamnosus GG, 1 capsule, Oral, Daily  lactulose, 10 g, Oral, BID  miconazole NITRATE 2 %, , Topical (Top), BID  midodrine, 10 mg, Oral, Q8H  octreotide, 100 mcg, Subcutaneous, Q8H  phytonadione ((AQUA-MEPHYTON) IVPB, 5 mg, Intravenous, Daily  pregabalin, 75 mg, Oral, BID  sodium bicarbonate, 1,300 mg, Oral, TID  thiamine, 100 mg, Oral, Daily        As Needed acetaminophen, dextrose 10%, dextrose 10%, miconazole nitrate 2%, ondansetron, prochlorperazine, Pharmacy to dose Vancomycin consult **AND** vancomycin - pharmacy to dose    ASSESSMENT/PLAN:     Assessment: Marshall Corona is a 47 y.o. male here with:     Active Hospital Problems    Diagnosis  POA    *KATHE (acute kidney injury) [N17.9]  Yes    Alcohol dependence in remission [F10.21]  Yes     Chronic    Decompensated hepatic cirrhosis [K72.90, K74.60]  Yes    Abscess of right arm [L02.413]  Yes    Hyponatremia [E87.1]  Yes    Debility [R53.81]  Yes    Alcoholic cirrhosis [K70.30]  Yes    Benign essential HTN [I10]  Yes    Symptomatic anemia [D64.9]  Yes      Resolved Hospital Problems   No resolved problems to display.        Plan:     KATHE (acute kidney injury)  Appears to be consistent with hepatorenal syndrome  Worsening GFR with metabolic acidosis and hyperkalemia   Patient and sister, Rah, informed  Cont midodrine, albumin, octreotide         Alcoholic cirrhosis  New diagnosis cirrhosis, likely 2/2 heavy EtOH use  HCV negative  DF > 32, may benefit from steroids however concern for active infection with WBC 19K  MELD = 35    MELD-Na score: 33 at 10/24/2022  8:36 AM  MELD score: 31 at 10/24/2022  8:36 AM  Calculated from:  Serum Creatinine: 2.6 mg/dL at 10/24/2022  8:36 AM  Serum Sodium: 129 mmol/L at 10/24/2022  8:36 AM  Total Bilirubin: 13.2 mg/dL at 10/24/2022  8:36 AM  INR(ratio): 1.6 at 10/24/2022  8:36 AM  Age: 47 years      - paracentesis, on rocephin  Thiamine  Vitamin K  Midodrine  Lactulose/rifaximine.     Symptomatic anemia  Hemoglobin improved post pRBC transfusion and has remained stable  No overt bleeding  Recent EGD at [facility]-- reportedly normal, will request records  Plan cscope as outpatient once medically optimized per GI     Hyponatremia  asymptomatic.  Now improved.     Abscess of right arm  Patient status post I&D on 10/18.    Cultures NGTD  WBC rising-- abx changed from clinda (compelted 3 days) to linezolid on 10/20  Add Rocephin      Debility  Continue aggressive PT/OT.  Work towards placement.     Thrombocytopenia  Likely secondary to underlying cirrhosis.  Monitor closely and transfuse as indicated.     Benign essential HTN  BP stable off of home antihypertensive regimen.        VTE Risk Mitigation (From admission, onward)           HIGH RISK CONDITION(S):  Patient has a condition that poses threat to life and bodily function: Acute Renal Failure        Sandra Neal MD

## 2022-10-24 NOTE — TREATMENT PLAN
Hepatology Treatment Plan    Marshall Corona is a 47 y.o. male admitted to hospital 10/22/2022 (Hospital Day: 3) due to KATHE (acute kidney injury).     Interval History  Patient out of room this morning for paracentesis. Vital signs normal on room air. Labs notable for on-going leukocytosis (17), stable macrocytic anemia (Hgb 8-9), and Cr slightly worse at 2.6 today from 2.4. He has yet to be evaluated by PT/OT.     Objective  Temp:  [97.3 °F (36.3 °C)-98.7 °F (37.1 °C)] 97.3 °F (36.3 °C) (10/24 1207)  Pulse:  [] 100 (10/24 1207)  BP: ()/(45-84) 113/68 (10/24 1207)  Resp:  [16-18] 18 (10/24 1207)  SpO2:  [95 %-100 %] 100 % (10/24 1207)    Laboratory    Lab Results   Component Value Date    WBC 17.42 (H) 10/24/2022    HGB 8.2 (L) 10/24/2022    HCT 25.1 (L) 10/24/2022     (H) 10/24/2022     (L) 10/24/2022       Lab Results   Component Value Date     (L) 10/24/2022    K 4.3 10/24/2022    CL 99 10/24/2022    CO2 20 (L) 10/24/2022    BUN 52 (H) 10/24/2022    CREATININE 2.6 (H) 10/24/2022    CALCIUM 9.0 10/24/2022       Lab Results   Component Value Date    ALBUMIN 3.5 10/24/2022    ALT 26 10/24/2022    AST 74 (H) 10/24/2022    ALKPHOS 147 (H) 10/24/2022    BILITOT 13.2 (H) 10/24/2022       Lab Results   Component Value Date    INR 1.6 (H) 10/24/2022    INR 1.7 (H) 10/23/2022    INR 1.6 10/22/2022       MELD-Na score: 33 at 10/24/2022  8:36 AM  MELD score: 31 at 10/24/2022  8:36 AM  Calculated from:  Serum Creatinine: 2.6 mg/dL at 10/24/2022  8:36 AM  Serum Sodium: 129 mmol/L at 10/24/2022  8:36 AM  Total Bilirubin: 13.2 mg/dL at 10/24/2022  8:36 AM  INR(ratio): 1.6 at 10/24/2022  8:36 AM  Age: 47 years    Assessment  This is a 47 year old male with a PMH significant for DVT (diagnosed in 11/2019 and status post treatment with Apixaban), 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 Presentation here notable for evidence of ascites, improving KATHE, and MELD >30.      Recommendations:      -Follow-up serological work-up for other etiologies of liver disease.   -Follow-up PETH and evaluation by addiction psychiatry.   -Follow-up fluid studies from paracentesis.   -Obtain ECHO.   -Follow-up nephrology recommendations for KATHE management.  -Midodrine TID.  -Lactulose TID and Rifaximin BID for encephalopathy management; titrate Lactulose to 3 bowel movements daily.   -PT/OT to assess functional status.   -CMP and INR daily.   -Avoid use of medications that may precipitate encephalopathy (e.g. opioids and benzo's).   -Given high MELD score, patient warrants consideration for transplant. However will hold off on initiation of transplant evaluation until further work-up of liver disease, evaluation by addiction psychiatry, PT/OT assessment, and evaluation by transplant .    Thank you for involving us in the care of Marshall Corona. Please call with any additional concerns or questions.        Reynaldo Saucedo MD, PGY-V  Gastroenterology Fellow  [facility]

## 2022-10-24 NOTE — DISCHARGE INSTRUCTIONS
REFERRAL RECOMMENDATIONS FOR SUBSTANCE ABUSE & MENTAL HEALTH      IN CASE OF SUICIDAL THINKING, call the National Suicide Hotline Number: 988    988 Suicide & Crisis Lifeline: 983 , 8-685-768-TQBL (4297)  [email or web address]       SUBSTANCE ABUSE:     [facility] (formerly known as the ABU)  [x] [phone], Option 2  [x] [address] [facility] 4th [facility], [place] [address]  [x] [email or web address]  [x] [facility] delivers comprehensive and collaborative treatment for alcohol and substance use disorders.  Excellent program for working professionals or anyone else seeking recovery.  [x] Requires insurance approval prior to starting program, call number above for more information.  [x] Intensive Outpatient Rehabilitation Program - M-F 9am-3pm - daily groups with psychologists and social workers, sessions with MDs 3x per week   [x] Ambulatory detox and dual diagnosis available      SUBOXONE:     NOTE: some Suboxone clinics require their clients to participate in a structured program (such as an IOP) in order to be prescribed Suboxone.  Some clinics have a long waiting list.  Most of these clinics do not accept walk-in clients, so call first to to learn what must be done to get started on Suboxone.    [facility] - [phone] (can do Sublocade)  [address] [facility], [place] [address]    [address]  [phone]    [facility] - [phone] (can do Sublocade)  [address] [place] [address]    [facility]  [address] [place]  [phone]     [facility] (very short waiting list, may accept some walk-in's but call first if possible)  [address], [place] [address]  [phone]; [phone]    [facility]   [address] [place]    [phone]    [facility] (can usually be seen within a week but is cash only  for appointment)  [address] [facility])  [address] [place], [place]  [phone]    [facility] ([facility])  [address] [facility] [address]  [phone]    [place], [place]:    [facility] - [address] [place] [address] - Tel: [phone]    Geovanni Carla - [address] [place] [address] - Tel: [phone]    Iraj Brown - [address] [place] [address] - Tel: [phone]    Levi Brower - [address] [place] [address] - Tel: [phone]    Osmel Sheikh - [address] [place] [address] - Tel: [phone]    [place], [place]:     Dr. Kaela Martinez and Dr. Matt Wagner - [address], [place] - Tel: [phone]    Dr. Jessa Cooper - [address] [facility] [place], [place] - Tel: [phone]    Dr. Garrick Oakley - Tel: [phone]    Dr. Jesus Oviedo - [facility] - [phone]      METHADONE:     [facility] (the only methadone clinic in the [facility], has two locations)  [x] [place] - [address] [place] [address], [phone]  [x] [facility] [place] [address], [phone]      12 STEP PROGRAMS (and similar):     Alcoholics Anonymous (local)  [x] [phone]  [x] www.[place].org for schedules for in-person and online meetings  [x] There are AA meetings throughout the day all over [facility]  [x] AA costs nothing to attend; they pass a basket for donations but this is not required    Narcotics Anonymous  [x] [phone]  [x] www.noana.org  [x] There are NA meetings throughout the day all over town  [x] NA costs nothing to attend; they pass a basket for donations but this is not required    Alcoholics Anonymous Online Intergroup (national)  [x] www.aa-intergroup.org  [x] Good resource for large, nation-wide meetings  [x] Can also attend smaller, local meetings in other cities  [x] Countless meetings all day and all night  [x] AA costs nothing to attend; they pass a basket for donations  but this is not required    LOOKING FOR AN ALTERNATIVE TO 12 STEP PROGRAMS - check out:  SMART Recovery: https://www.smartrecovery.org/about-us  [facility] Recovery: https://recoverydharma.org      DETOX UNITS (USUALLY 5-7 DAYS):     [facility]: [address], [facility]  [phone], call first to ensure bed availability    [facility]: [address] [facility]  [phone], Option 1, call first to ensure bed availability    [facility] Detox and Recovery Center: [address] , [facility]  [phone] (intake by appointment only)    [facility]: [address], [facility]  [phone]      INTENSIVE OUTPATIENT PROGRAMS:     [facility] (formerly known as the ABU)  [x] [phone], Option 2  [x] [address] [facility], [address] 4th Floor, [facility] [address]  [x] [email or web address]://www.[email or web address]/services/[facility]recovery-program  [x] The [facility] delivers comprehensive and collaborative treatment for alcohol and substance use disorders.  Excellent program for working professionals or anyone else seeking recovery.  [x] Requires insurance approval prior to starting program, call number above for more information.  [x] Intensive Outpatient Rehabilitation Program - M-F 9am-3pm - daily groups with psychologists and social workers, sessions with MDs 3x per week   [x] Ambulatory detox and dual diagnosis available    [facility] Int[facility]patient Program  [x] [phone]  [x] [address] [facility] (the clinic not on [facility])  [x] Call number above for more info and to check insurance requirements    [facility]  [address] [place], [place] [address] [phone]    [facility]:  [address] [facility], [address]?, [place], [place] [address]?, [phone]  [address] [facility]?, [place], [place] [address]?, [phone]    RESIDENTIAL REHABS (USUALLY 28 DAYS):     [facility]: [address] [facility] [phone]    [facility] Detox & Recovery Center:  [address] [place]  [phone] (intake by appointment only)    [facility] (men only) [address] [place] [address], [phone]    [facility] (Female only) [address] [place] [address], [phone]    [facility]: [address], [place], men's program [phone], women's program [phone]    [facility]: [address] [place] [address], [phone]    [facility]: [address] [place], [place], [phone]    [facility]: Men only, [phone], [address] [place]    [facility]: [address], [place], [place], [phone]    [facility]: [address] [place] [address],  [phone]  New Location: [address] [place] [address] [address], [place] [address], [phone]    [facility]:   ?[address]?[place], [place] [address]?[phone]    [place]: [address] [place] [address], [phone]    [place]: [place], [place], [phone]     [facility]: [place], [place], [phone]    [facility]: [place], [place], [phone]    [facility]: [place], [place], [phone]    [place]: [place], [place], [phone]    [facility]: [address] [facility], [place] [address], [phone]    COMMUNITY ADDICTION CLINICS:     [facility]: [address] [facility], [address] [place], [phone] -or- [address] [place] [address]    [facility]: [address] [place], [place]  [address]     [facility]: Clinics [phone]; Crisis [phone]    [facility]: [address] [facility] [place] [address]    [facility]: [address] [facility] [address]    [facility]: [address] [place] [address],    [facility] Behavior[facility] Health Center: 2nd Floor [address] [place] [address]    [facility]: [address], [facility] [place] [address]    [place]  [facility], [address], [place] [address]    [facility]: [address] [facility], [place] [phone]  (serves youth 16-23 years old)    [facility]: [facility] [place] Luis/[place] [phone]    [facility]: [address] [facility], [place] [phone]    [facility]: [address], [place] [phone]    [facility]: [address] [facility] [place], [address], [phone]     [place] [facility]: [address] [facility] [place], [phone], [phone]    RESOURCES IN OTHER [facility]:     [facility]: [address] [place], [phone], [phone]    [place] [facility]: [address] [facility], [address], [phone]    [facility], [address] [place] [address], [phone]    [facility] Health: [address] [facility], [phone]    [facility], [address] [facility], [phone] ([facility])    [facility], [address] [facility], [phone] ([facility])    [facility] Health, [address] [facility], [address], [place], [phone] ([place], [place], and [facility])    [facility], [address] , [place], [phone] ([facility])    [facility] Hotline [phone], [phone]    [facility], [address] [facility], [facility], [address] [facility] [address], [facility], [address] [facility], [facility], [address] [facility] B., [facility], [address], [place]    [facility], [address] [place], [place] [phone]    [facility]  "[facility], [address] [facility] [phone]    [facility], [address] [facility], [phone]    [facility], [address] [facility], [phone]      Hungarian SPEAKING (en español):     Información de la reunión de Alcohólicos Anónimos  [facility], 10:00 am  Habla español  Esta reunión está abierta y cualquiera puede asistir.    Azerbaijani speaking Alcoholics anonymous meetings:  El "[place] AA Skype" es un kenton on line de Alcohólicos Anónimos en [facility]. El kenton es nico, gratuito y virtual a través de Skype Audio. El kenton funciona mediante christopher llamada grupal de voz, por lo que no se utiliza la videollamada, ni se pueden bobby las imágenes o rostros de los participantes. Hace sarah años y medio abrimos el primer Kenton de AA por Skype en [place], tramaine actualmente asisten personas desde [place], [place], [place], [place], [place], [place], [place], [place], [place], [place], [place] y [place], entre otros.    El kenton es muy útil para los alcohólicos que residen en lugares donde no se celebran reuniones de AA, o residen en lugares donde las reuniones de AA son un número limitado de días a la semana, o para aquellos compañeros que se hayan de viaje o que, por cualquier motivo, se hayan convalecientes y no pueden desplazarse. Todos los días nos reuniones a las 21:00 (hora española)    Podéis obtener más información sobre el kenton y jose sesiones en la página web [email or web address]      MENTAL HEALTH:     [facility] Outpatient Clinic  [phone]    [facility] - General Psychiatry Intensive Outpatient Program  [facility] (formerly known as the [facility])  [phone], option 3    [facility] - Dual Diagnosis Intensive Outpatient Program  [facility] (formerly " known as the [facility])  [phone], option 2      COMMUNITY MENTAL HEALTH CENTERS:     [facility]  (aka [facility])  Serves [facility] and [facility] residents.  Serves uninsured patients & those with Medicaid.  Main location: [address] [place], [place] [address] [phone]  Walk-in's available during regular business hours.  24/7 Crisis Line: [phone]    [facility]  (aka [facility], aka [facility])  Serves [facility].  Serves uninsured patients, those with Medicaid and some private plans.  Walk-in's available during regular business hours.  Primary care services available as well.  [facility]: [address] [facility] [place] [address];  [phone]  [place]: [address] [place] [address];  [phone]  24/7 Crisis Line: [phone]    [facility]  Serves uninsured patients & those with Medicaid, call for more info.  Primary care, pediatrics, HIV treatment, and dentistry services available as well.  Three locations.  [phone]    [facility]?Corporate Office  Serves patients with Medicaid, Medicare, and private insurance  [address]  [place],?[place] [address] [phone]    [facility]  Serves uninsured on a sliding scale, as well as Medicaid, Medicare, and private plans.  Eight locations around the [facility] area.  [phone]    [facility]  Serves uninsured patients & those with Medicaid, private insurances.  Primary care available as well.  [phone]  [facility] [place] [address]    [facility]  Serves veterans who were honorably discharged.  [address] [place] [address]  [phone]  24/7 Veterans Crisis Line: [phone] (Press 1)    If you have private insurance and need to find a  specialist, please contact your insurance network to request a list of providers covered by your benefits.      MENTAL HEALTH/ADDICTIVE DISORDERS:     AA ([phone]), NA ([phone])   National Suicide Prevention Lifeline- Call 1-509.554.7394 Available 24 hours everyday  [facility] [phone]; Crisis Line [phone] - Call for options A-F:  Intensive Outpatient Treatment/ Day programs   [facility], please contact   [facility] [place] [facility], please contact [phone] or [phone] to speak with an admissions counselor.  [facility] (Methadone Maintenance)   [address] [place] [address], [phone]  [address], [place] [address] [phone]  [facility], [address] [address], [phone]  [facility] Addiction Treatment [facility] [phone]  [facility] please contact [phone]  [facility], [address] [facility], 4th [place] [address] Phone: [phone]   [facility]cer  [place] Office: [address] [address], [phone]  [place] Office: [address] [place] [address] B, [place] [address], [phone]  [place] Office: [address] [place] [address] [phone]    Outpatient Substance Abuse Treatment   [facility] (Methadone Maintenance)   [address] [place] [address], [phone]  [address], [place] [address] [phone]  [facility], [address] [address], [phone]  [facility]  [place] Office: [address] [address], [phone]  [place] Office: [facility], [address], [place] [address], [phone]  [place] Office: [address] [place] [address] [phone]  [place] [facility], [address] [place] [address] [phone]   [facility] Recovery, [address] [facility], [address], [phone]  [place]  [facility], [address] [place], [place] [phone]    Residential Substance Abuse Treatment   [facility] [address] [facility], [phone]  [facility], [address] [facility], [phone]  [facility] (men only) [address] [place] [address], [phone]  [facility] at the [facility] (women only) [address] [place] [address] [phone]  [facility], [address] [place] [address] [phone]  [facility] (women only), intakes at [address] [facility], [phone]  Responsibility [place] (7-day program, $100, [address], [phone], [phone], [phone])  [facility] (Men only, [phone]), [address], [place] (Vets*/Non-Vets)  [facility] (Men only, $400/month program fee) [address] [facility], [phone]  [facility] (Women over age 39 only), [address] [facility], [phone]    Out of Area:    [place], [address] [place] [address], [place] ([phone])  [facility] (men only), [address] [facility] [place] [address], [phone]  [facility], [address] [place], [place] ([phone])  [place], [facility], [place] ([phone])  [facility], [address] [facility], [phone]  [facility] (Women only, has to have mental illness, can be homeless or substance abuser), [phone]        DOMESTIC VIOLENCE RESOURCES:     Advocacy  [facility] ([facility])  [address] [place] [address]    NOFJC ? [phone]  Services provided: emergency shelter, individual advocacy, information and referrals, group support, children's program, medical advocacy, forensic medical exams, primary care, legal assistance, counseling, safety planning, and caregiver support    [facility] HEALING AND EMPOWERMENT [place]  Confidential location  [facility] ? [phone]  Services provided: short term emergency shelter, all services provided are free of charge    [facility] FOR COMMUNITY ADVOCACY  Multiple locations in [facility], [place], [facility], [place], and [facility] ([place], [place], and [place])    [place] ? [phone]  Services provided: emergency shelter, individual advocacy, information and referrals, group support, children's program, medical advocacy, legal assistance in obtaining restraining orders, counseling, safety planning, and caregiver support    [facility]   Emergency Shelter   [phone]  Emergency Services ,Legal and Financial Assistance Services ,Housing Services ,Support Services     [facility]   [phone]  Emergency Services ,Counseling Services , Housing Services ,Support Services ,Children's Services     WOMEN WITH A VISION  [address] [place] [address]  WWAV ? [phone]  Services provided: advocacy, health education and supportive services, specializing in free healing services for marginalized groups, including LGBTQ individuals and sex workers    SEXUAL TRAUMA AWARENESS AND RESPONSE (STAR)  [address] [place] [address]    STAR ? (157) 425-STAR  Services provided: individual advocacy, information and referrals, group support, medical advocacy, legal assistance, counseling, and safety planning for survivors of sexual assault    [facility])  [address] [place] [address]  [facility] Forensic Program ? [phone]  Services provided: free forensic medical exams for sexual assault and domestic violence, which can be performed up to 5 days after an incident. It is not necessary to make a police report to receive a forensic medical exam    Legal  [facility]  [address] [address]  Project SAVE ? [phone]  Services provided: free emergency legal representation for survivors of doemstic  violence residing in [facility]. Legal services may include temporary restraining orders, temporary child support, custody, and use of property    [facility] LEGAL SERVICES (SLLS)  [address], [place] [address]  SLLS ? [phone]  Services provided: free legal representation for survivors of domestic violence residing in [facility]. Legal services may include temporary child support, custody, and divorce      HOTLINES:     [facility] DOMESTIC VIOLENCE HOTLINE  [phone]    Services provided: free and confidential hotline for victims and survivors of domestic violence. All calls will be routed to a domestic violence service provided in the victim or survivor's area    NATIONAL HUMAN TRAFFICKING HOTLINE  ([phone] 968-6213    Services provided: national anti-trafficking hotline serving victims and survivors of human trafficking. Provides information about local resources, and access to safe space to report tips, seek services, and ask for help    VIA LINK  211 or [phone]    Service provided: counselors can provide crisis counseling. Counselors can also provide information and referrals to programs which can help with needs such as food, shelter, medical care, financial assistance, mental health services, substance abuse treatment, senior services, childcare, and more      HOMELESS SHELTERS:      Homeless shelters  [facility]  Emergency shelter for individuals and families  [address]  [phone]  [facility]  Emergency shelter for men only  Meals daily 6am, 2pm, & 6pm  Clothing, case management M-F by appointment (ID/job/housing/legal assistance), mail  [address] [facility]  [phone]  [facility]  Emergency shelter for men  [address] [facility]  [phone]  Emergency shelter for women  [address] [facility]  [phone]  Breakfast & lunch daily, dinner M-F  Case management, job counseling services   [facility]  Emergency shelter for  teens and young adults up to 20yo  [address]  [phone]  [place] [facility]  Emergency shelter for women over 19yo and their kids  [address] [place] [address]  [phone]  [facility]  Day program, meals M-F 1PM (arrive early)  Showers, laundry, hygiene kits, showers, phones, , notary services, case management, ID assistance  [address] [facility]   [phone] M-F 8am-2:30pm  [employer]  Day program  MTWF 7:30am-3:30pm,  8:30am-3:30pm  Crisis intervention, employment assistance, food/clothing, hygiene kits, bus tokens, mail  [address] [facility] [address]  [phone]  [facility]  Mobile outreach for homeless persons in [facility]  [phone]  [facility]  Primary healthcare, case management, dental services, TB placement  Call ahead  [address] [facility] 2nd [address]  [phone]  [facility] at the [facility]  Connects homeless people with their loved ones in other cities by providing transportation costs   [phone]      [place] RESOURCES:     [place] Mobile Mental Health Crisis Response Team:    Region 12 ([place], [place], [place], and [facility]) ([facility])  [phone]      Outpatient Mental Health & Addiction Clinic Resources for both [facility]:    [facility] Mental Healthcare Resources  Website: [email or web address]  Main Number: [phone]    [facility] ([facility])  [address] ([address] [facility]) [place] [address]  [phone]    [facility] ([facility])  [address] ([address]) [place] [address] [phone]    [facility]  [address] ([address]) [place] [address] [phone]    [facility]  [address] ([address]) [place] [address] [phone]      Addiction Treatment Resources for both [facility] and  [facility]:    [facility] (Detox, Residential, PHP, IOP, and Aftercare Programs)  [address] [place] [address]  [phone]    [facility] (Residential, IOP, Transitional Living, and Aftercare Programs)  [address] [place] [address]  [phone]    [facility] (Inpatient, Residential, Detox, IOP, Outpatient, and Aftercare Programs)  [address] [place] [address]  [phone] or toll free at [phone]      Outpatient Mental Health Psychotherapy Resources for both [facility] and [facility]:    Estela Toscano, [facility]  [address]  [place] [address]  [phone]  Specialties: Depression, Anxiety, and Life Transitions    Olga Lidia Zamudio, PhD  [address] [address]  [address]  [place] [address]  [phone]  Specialties: Testing and Evaluation, Education and Learning Disabilities, and ADHD    Brittany Cisneros, [facility] [address] [place] [address]  [phone]  Specialties: Obsessive-Compulsive (OCD), Depression, and Relationship Issues    Charissa Lam [facility] [address] [place] [address]  [phone]  Specialties: Trauma & PTSD, Mood Disorders, and Anxiety    Charissa Patel, PhD, [facility] [address] [place] [address]  [phone]  Specialties: Family Conflict, Child, and Relationship Issues    Wen Hendricks [facility] [place] [address] [phone]  Specialties: Anxiety, Depression, and Anger Management        IN CASE OF SUICIDAL THINKING, call the National Suicide Hotline Number: 988    988 Suicide & Crisis Lifeline: 987 , 4-613-901-TALK (7534)  Provides 24/7, free and confidential support for people in distress, prevention and crisis resources for you or your loved ones, and best practices for professionals.    Call,  text or chat.  https://988Reach.ly.org

## 2022-10-24 NOTE — PLAN OF CARE
Problem: Occupational Therapy  Goal: Occupational Therapy Goal  Description: Goals to be met by: 10/24/22     Patient will increase functional independence with ADLs by performing:    UE Dressing with Modified [place].  LE Dressing with Modified [place].  Grooming while standing at sink with Modified [place].  Toileting from toilet with Modified [place] for hygiene and clothing management.   Toilet transfer to toilet with Modified [place].    Outcome: Ongoing, Progressing

## 2022-10-24 NOTE — PLAN OF CARE
[address] - Transplant Stepdown  Initial Discharge Assessment       Primary Care Provider: Primary Doctor No    Admission Diagnosis: Pre-transplant evaluation for liver transplant [Z01.818]    Admission Date: 10/22/2022  Expected Discharge Date: 10/26/2022         Payor: MEDICAID / Plan: [facility] / Product Type: Managed Medicaid /     Extended Emergency Contact Information  Primary Emergency Contact: Ofelia Rashid  Mobile Phone: [phone]  Relation: Other  Preferred language: English   needed? No    Discharge Plan A: Home with family  Discharge Plan B: [facility]      [id number] - [place], [place] - [address] AT [facility] [address]  [address]  [facility] [address]  Phone: [phone] Fax: [phone]      Initial Assessment (most recent)       Adult Discharge Assessment - 10/24/22 1344          Discharge Assessment    Assessment Type Discharge Planning Assessment     Confirmed/corrected address, phone number and insurance Yes     Confirmed Demographics Correct on Facesheet     Source of Information patient     Communicated YARITZA with patient/caregiver Date not available/Unable to determine     Reason For Admission KATHE (acute kidney injury)     Lives With other (see comments)     Do you expect to return to your current living situation? Yes     Do you have help at home or someone to help you manage your care at home? Yes     Who are your caregiver(s) and their phone number(s)? Ofelia Rashid [phone]     Home Layout Able to live on 1st floor     Equipment Currently Used at Home walker, rolling     Patient currently being followed by outpatient case management? No     Do you currently have service(s) that help you manage your care at home? No     Do you take prescription medications? Yes     Is the patient taking medications as prescribed? yes     Who is going to help you get home at discharge? --     Are you on dialysis? No     Do  you take coumadin? No     Discharge Plan A Home with family     Discharge Plan B Home Health     DME Needed Upon Discharge  other (see comments)   DILMA                       This SW met with patient at bedside to complete DPA. Questions answered / contact numbers provided.  Use PREFERRED PHARMACY / BEDSIDE DELIVERY for any necessary medications at time of discharge. The patient is independent with all ADLs - does  use walker, In-home equipment, is not on HD, BTs or home oxygen.The patient's friend will be assisting with help upon discharge. The patient reported that he would need transportation home upon d/c . Hospital follow up will be scheduled with PCP. Will continue to follow for course of hospitalization.       Morgan Travis LMSW  Case Management [facility]  Ext: [phone]

## 2022-10-24 NOTE — CONSULTS
[facility] - Transplant Stepdown  Adult Nutrition  Consult Note    Liver transplant evaluation    SUMMARY     Recommendations    1. Rec Renal diet      2. Add Novasource renal BID to optimize nutrient intake     3. RD to monitor and follow      Goals: Meet % EEN, EPN by RD f/u  Nutrition Goal Status: new  Communication of RD Recs:  (POC)    Assessment and Plan    Nutrition Problem  Increased nutrient needs (protein, energy)    Related to (etiology):   Increased metabolic demands    Signs and Symptoms (as evidenced by):   Liver cirrhosis    Interventions/Recommendations (treatment strategy):  Collaboration with other providers  Nutrition education    Nutrition Diagnosis Status:   New      Reason for Assessment    Reason For Assessment: consult - liver transplant evaluation  Diagnosis:  (KATHE)  Relevant Medical History: HTN, cirrhosis  Interdisciplinary Rounds: did not attend    General Information Comments:   RD consulted for liver transplant evaluation. Pt reports good appetite PTA. Usually eat 1-2 full meals per day. Tolerating 75% of meals per chart. Denies N/V, chewing/swallowing difficulties. Stated  lb. Per wt hx, noted wt loss 16 lb (7%) x 7 months from March to Oct (not significant). Wt gained of 9 lb x 9 days - likely 2/2 fluid. 2+ dependent edema per chart. NFPE completed today, pt appears nourished. Does not meet criteria for malnutrition at this time.     Nutrition Discharge Planningm Na education provided on 10/24. Discussed foods to limit or avoid and benefits of diet adherence. Post transplant expectations/guidelines also introduced. Appropriate education material, RD contact information provided. No other needs identified.    Nutrition Risk Screen    Nutrition Risk Screen: no indicators present    Nutrition/Diet History    Spiritual, Cultural Beliefs, Buddhism Practices, Values that Affect Care: no    Anthropometrics    Temp: (!) 102 °F (38.9 °C)  Height: 6' (182.9 cm)  Height  (inches): 72 in  Weight Method: Bed Scale  Weight: 103.4 kg (228 lb)  Weight (lb): 228 lb  Ideal Body Weight (IBW), Male: 178 lb  % Ideal Body Weight, Male (lb): 128.09 %  BMI (Calculated): 30.9     Lab/Procedures/Meds    Pertinent Labs Reviewed: reviewed  Pertinent Labs Comments: Na 129, glucose 135, BUN 52, Cr 26, , AST 74, eGFR 29.3  Pertinent Medications Reviewed: reviewed  Pertinent Medications Comments: albumin, folic acid, Lactobacillus, thiamine, vit K, lactulose    Estimated/Assessed Needs    Weight Used For Calorie Calculations: 103.5 kg (228 lb 2.8 oz)  Energy Calorie Requirements (kcal): 1938 kcal  Energy Need Method: [place]-St Jeor (PAL 1.0)  Protein Requirements: 80g (1g/kg IBW)  Weight Used For Protein Calculations: 80.7 kg (178 lb) (IBW)  Fluid Requirements (mL): per MD  RDA Method (mL): 1938     Nutrition Prescription Ordered    Current Diet Order: Low Na    Evaluation of Received Nutrient/Fluid Intake    I/O: 0.1 L since admit  Energy Calories Required: not meeting needs  Protein Required: not meeting needs  Comments: LBM 10/24  Tolerance: tolerating    Nutrition Risk    Level of Risk/Frequency of Follow-up: low     Monitor and Evaluation    Food and Nutrient Intake: food and beverage intake, energy intake  Food and Nutrient Adminstration: diet order  Knowledge/Beliefs/Attitudes: food and nutrition knowledge/skill  Physical Activity and Function: nutrition-related ADLs and IADLs  Anthropometric Measurements: height/length, weight, weight change, body mass index  Biochemical Data, Medical Tests and Procedures: electrolyte and renal panel, gastrointestinal profile, glucose/endocrine profile, inflammatory profile, lipid profile  Nutrition-Focused Physical Findings: overall appearance     Nutrition Follow-Up    RD Follow-up?: Yes    Js FONSECA

## 2022-10-24 NOTE — H&P
Inpatient Radiology Pre-procedure Note    History of Present Illness:  Marshall Corona is a 47 y.o. male who presents for ultrasound guided paracentesis.  Admission H&P reviewed.  Past Medical History:   Diagnosis Date    Alcohol abuse 10/23/2022    Allergy     Guillain-[place] syndrome     Hypertension     Peripheral venous insufficiency 6/21/2021    Pulmonary hypertension 8/3/2021    Thrombosis of left saphenous vein 11/26/2019    Uncomplicated alcohol dependence 10/28/2019    Vitamin B12 deficiency (non anemic) 4/30/2020     Past Surgical History:   Procedure Laterality Date    ARTHROSCOPY OF KNEE  2001    Right knee scope    EYE SURGERY      bilateral lasic    INCISION AND DRAINAGE OF ABSCESS Right 10/18/2022    Procedure: INCISION AND DRAINAGE, ABSCESS-AXILLA;  Surgeon: Geovanni Lee MD;  Location: [facility] OR;  Service: General;  Laterality: Right;       Review of Systems:   As documented in primary team H&P    Home Meds:   Prior to Admission medications    Medication Sig Start Date End Date Taking? Authorizing Provider   amitriptyline (ELAVIL) 50 MG tablet Take 50 mg by mouth every evening.    Historical Provider   benazepriL (LOTENSIN) 20 MG tablet Take 20 mg by mouth 2 (two) times daily. 8/16/22   Historical Provider   bisoprolol (ZEBETA) 5 MG tablet Take 5 mg by mouth once daily. 7/15/22   Historical Provider   fluticasone propionate (FLONASE) 50 mcg/actuation nasal spray 1 spray (50 mcg total) by Each Nostril route 2 (two) times daily. 3/24/21   Daniele Woodard MD   folic acid (FOLVITE) 1 MG tablet Take 1,000 mcg by mouth once daily. 10/12/22   Historical Provider   lactulose (CHRONULAC) 10 gram/15 mL solution Take 15 mLs by mouth 2 (two) times daily.    Historical Provider   multivitamin (ONE DAILY MULTIVITAMIN) per tablet Take 1 tablet by mouth once daily.    Historical Provider   nystatin (MYCOSTATIN) cream Apply 1 g topically 2 (two) times daily. 10/12/22   Historical Provider   pantoprazole  "(PROTONIX) 40 MG tablet Take 40 mg by mouth 2 (two) times daily. 10/12/22   Historical Provider   pregabalin (LYRICA) 100 MG capsule Take 100 mg by mouth 3 (three) times daily.    Historical Provider     Scheduled Meds:    albumin human 25%  50 g Intravenous BID    cefTRIAXone (ROCEPHIN) IVPB  2 g Intravenous Q24H    folic acid  1,000 mcg Oral Daily    Lactobacillus rhamnosus GG  1 capsule Oral Daily    lactulose  10 g Oral BID    miconazole NITRATE 2 %   Topical (Top) BID    midodrine  10 mg Oral Q8H    octreotide  100 mcg Subcutaneous Q8H    phytonadione ((AQUA-MEPHYTON) IVPB  5 mg Intravenous Daily    pregabalin  75 mg Oral BID    sodium bicarbonate  1,300 mg Oral TID    thiamine  100 mg Oral Daily     Continuous Infusions:   PRN Meds:acetaminophen, dextrose 10%, dextrose 10%, miconazole nitrate 2%, ondansetron, prochlorperazine, Pharmacy to dose Vancomycin consult **AND** vancomycin - pharmacy to dose  Anticoagulants/Antiplatelets: no anticoagulation    Allergies:   Review of patient's allergies indicates:   Allergen Reactions    Codeine Itching, Anaphylaxis and Hives    Hydrocodone-acetaminophen      "Agitation"    Influenza virus vaccines Other (See Comments)    Oxycodone-acetaminophen      "Agitation"     Sedation Hx: have not been any systemic reactions    Vitals:  Temp: 98.7 °F (37.1 °C) (10/24/22 0945)  Pulse: 102 (10/24/22 0945)  Resp: 18 (10/24/22 0945)  BP: 111/71 (10/24/22 0945)  SpO2: 98 % (10/24/22 0945)     Physical Exam:  ASA: 3  Mallampati: n/a    General: no acute distress  Mental Status: alert and oriented to person, place and time  HEENT: normocephalic, atraumatic  Chest: unlabored breathing  Heart: regular heart rate  Abdomen: distended  Extremity: moves all extremities    Plan: ultrasound guided paracentesis  Sedation Plan: local    DIOMEDES Castro, CHRISTOPHP  Interventional Radiology  [phone] clinic    "

## 2022-10-24 NOTE — PT/OT/SLP EVAL
"Occupational Therapy   Co-Evaluation  Co-treat with PT to accommodate pt activity tolerance and need for skilled hands for safe intervention.    Name: Marshall Corona  MRN: [id number]  Admitting Diagnosis:  KATHE (acute kidney injury)  Recent Surgery: * No surgery found *      Recommendations:     Discharge Recommendations: rehabilitation facility  Discharge Equipment Recommendations:  bedside commode  Barriers to discharge:  None    Assessment:     Marshall Corona is a 47 y.o. male with a medical diagnosis of KATHE (acute kidney injury).  He presents with KATHE. Performance deficits affecting function: weakness, impaired endurance, impaired self care skills, impaired functional mobility, gait instability, impaired balance, decreased coordination, pain, decreased safety awareness.      Rehab Prognosis: Good; patient would benefit from acute skilled OT services to address these deficits and reach maximum level of function.       Plan:     Patient to be seen 3 x/week to address the above listed problems via self-care/home management, therapeutic activities, therapeutic exercises, neuromuscular re-education  Plan of Care Expires: 10/30/22  Plan of Care Reviewed with: patient    Subjective     Chief Complaint: KATHE  Patient/Family Comments/goals: "I am going to move in with my sister when I can leave the state"    Occupational Profile:  Living Environment: Pt is planning to move in with his sister when he can leave the state. The sister has no stairs in her house, 0 ZAK, and a walk in shower.  Previous level of function: Pt was independent with ADLs prior to admission  Roles and Routines: Pt enjoys spending time with family  Equipment Used at Home:  walker, rolling  Assistance upon Discharge: IPR recommended upon d/c. Pt is a good candidate for IP rehab because of independent PLOF, level of motivation, ability to follow commands, ability to tolerate three hours of therapy per day, and of need for multidisciplinary approach to " address complex pt needs.       Pain/Comfort:  Pain Rating 1:  (pt did not rate)  Location - Orientation 1: generalized  Location 1: abdomen  Pain Addressed 1: Reposition, Distraction  Pain Rating Post-Intervention 1:  (did not rate)  Pain Addressed 2: Reposition, Distraction, Nurse notified    Patients cultural, spiritual, Buddhism conflicts given the current situation: no    Objective:     Communicated with: RN prior to session.  Patient found HOB elevated with Other (comments) (no active lines) upon OT entry to room.    General Precautions: Standard, fall   Orthopedic Precautions:N/A   Braces: N/A  Respiratory Status: Room air    Occupational Performance:    Bed Mobility:    Patient completed Supine to Sit with contact guard assistance  Patient completed Sit to Supine with minimum assistance    Functional Mobility/Transfers:  Patient completed Sit <> Stand Transfer with minimum assistance  with  rolling walker   Patient completed Toilet Transfer Step Transfer technique with moderate assistance with  rolling walker  Functional Mobility: completed to the bathroom for ADLs with CGA for balance and safety    Activities of Daily Living:  Toileting: total assistance for pericare    Cognitive/Visual Perceptual:  Cognitive/Psychosocial Skills:     -       Oriented to: Person, Place, Time, and Situation     Physical Exam:  Upper Extremity Range of Motion:     -       Right Upper Extremity: WFL  -       Left Upper Extremity: WFL  Upper Extremity Strength:    -       Right Upper Extremity: WFL  -       Left Upper Extremity: WFL   Strength:    -       Right Upper Extremity: WFL  -       Left Upper Extremity: WFL    AMPAC 6 Click ADL:  AMPAC Total Score: 18    Treatment & Education:  Pt educated on OT POC  Pt educated on using call bell to request assistance for fxnl ambulation  All questions within OT scope of practice answered to satisfaction  Pt left with all lines intact and all needs met         Patient left  on  stretcher with transport      GOALS:   Multidisciplinary Problems       Occupational Therapy Goals          Problem: Occupational Therapy    Goal Priority Disciplines Outcome Interventions   Occupational Therapy Goal     OT, PT/OT Ongoing, Progressing    Description: Goals to be met by: 10/24/22     Patient will increase functional independence with ADLs by performing:    UE Dressing with Modified [place].  LE Dressing with Modified [place].  Grooming while standing at sink with Modified [place].  Toileting from toilet with Modified [place] for hygiene and clothing management.   Toilet transfer to toilet with Modified [place].                         History:     Past Medical History:   Diagnosis Date    Alcohol abuse 10/23/2022    Allergy     Guillain-[place] syndrome     Hypertension     Peripheral venous insufficiency 6/21/2021    Pulmonary hypertension 8/3/2021    Thrombosis of left saphenous vein 11/26/2019    Uncomplicated alcohol dependence 10/28/2019    Vitamin B12 deficiency (non anemic) 4/30/2020         Past Surgical History:   Procedure Laterality Date    ARTHROSCOPY OF KNEE  2001    Right knee scope    EYE SURGERY      bilateral lasic    INCISION AND DRAINAGE OF ABSCESS Right 10/18/2022    Procedure: INCISION AND DRAINAGE, ABSCESS-AXILLA;  Surgeon: Geovanni Lee MD;  [facility];  Service: General;  Laterality: Right;       Time Tracking:     OT Date of Treatment: 10/24/22  OT Start Time: 1425  OT Stop Time: 1441  OT Total Time (min): 16 min  PT present throughout session    Billable Minutes:Evaluation 8  Self Care/Home Management 8    10/24/2022

## 2022-10-24 NOTE — PROCEDURES
Radiology Post-Procedure Note    Pre Op Diagnosis: Ascites  Post Op Diagnosis: Same    Procedure: Ultrasound Guided Paracentesis    Procedure performed by: Luis Felipe ARANGO, Dolores     Written Informed Consent Obtained: Yes  Specimen Removed: YES clear yellow  Estimated Blood Loss: Minimal    Findings:   Successful paracentesis.  Albumin administered PRN per protocol.    Patient tolerated procedure well.    Dolores Briscoe, APRN, FNP  Interventional Radiology  [phone] clinic

## 2022-10-25 LAB
ALBUMIN SERPL BCP-MCNC: 3.3 G/DL (ref 3.5–5.2)
ALP SERPL-CCNC: 139 U/L (ref 55–135)
ALT SERPL W/O P-5'-P-CCNC: 23 U/L (ref 10–44)
ANION GAP SERPL CALC-SCNC: 12 MMOL/L (ref 8–16)
AST SERPL-CCNC: 57 U/L (ref 10–40)
BASOPHILS # BLD AUTO: 0.09 K/UL (ref 0–0.2)
BASOPHILS NFR BLD: 0.6 % (ref 0–1.9)
BILIRUB SERPL-MCNC: 10.9 MG/DL (ref 0.1–1)
BUN SERPL-MCNC: 52 MG/DL (ref 6–20)
CALCIUM SERPL-MCNC: 8.6 MG/DL (ref 8.7–10.5)
CHLORIDE SERPL-SCNC: 101 MMOL/L (ref 95–110)
CO2 SERPL-SCNC: 17 MMOL/L (ref 23–29)
CREAT SERPL-MCNC: 2.8 MG/DL (ref 0.5–1.4)
DIFFERENTIAL METHOD: ABNORMAL
EOSINOPHIL # BLD AUTO: 0.6 K/UL (ref 0–0.5)
EOSINOPHIL NFR BLD: 4 % (ref 0–8)
ERYTHROCYTE [DISTWIDTH] IN BLOOD BY AUTOMATED COUNT: 19.3 % (ref 11.5–14.5)
EST. GFR  (NO RACE VARIABLE): 26.8 ML/MIN/1.73 M^2
GLUCOSE SERPL-MCNC: 151 MG/DL (ref 70–110)
HCT VFR BLD AUTO: 26.3 % (ref 40–54)
HGB BLD-MCNC: 8.7 G/DL (ref 14–18)
IMM GRANULOCYTES # BLD AUTO: 0.1 K/UL (ref 0–0.04)
IMM GRANULOCYTES NFR BLD AUTO: 0.6 % (ref 0–0.5)
INR PPP: 1.7 (ref 0.8–1.2)
LYMPHOCYTES # BLD AUTO: 1.4 K/UL (ref 1–4.8)
LYMPHOCYTES NFR BLD: 9.3 % (ref 18–48)
MAGNESIUM SERPL-MCNC: 1.8 MG/DL (ref 1.6–2.6)
MCH RBC QN AUTO: 35.1 PG (ref 27–31)
MCHC RBC AUTO-ENTMCNC: 33.1 G/DL (ref 32–36)
MCV RBC AUTO: 106 FL (ref 82–98)
MITOCHONDRIA AB TITR SER IF: NORMAL {TITER}
MONOCYTES # BLD AUTO: 1.4 K/UL (ref 0.3–1)
MONOCYTES NFR BLD: 9.2 % (ref 4–15)
NEUTROPHILS # BLD AUTO: 11.8 K/UL (ref 1.8–7.7)
NEUTROPHILS NFR BLD: 76.3 % (ref 38–73)
NRBC BLD-RTO: 0 /100 WBC
PHOSPHATE SERPL-MCNC: 4.9 MG/DL (ref 2.7–4.5)
PLATELET # BLD AUTO: 104 K/UL (ref 150–450)
PMV BLD AUTO: 13.3 FL (ref 9.2–12.9)
POTASSIUM SERPL-SCNC: 4.1 MMOL/L (ref 3.5–5.1)
PROT SERPL-MCNC: 5.5 G/DL (ref 6–8.4)
PROTHROMBIN TIME: 17.7 SEC (ref 9–12.5)
RBC # BLD AUTO: 2.48 M/UL (ref 4.6–6.2)
SMOOTH MUSCLE AB TITR SER IF: NORMAL {TITER}
SODIUM SERPL-SCNC: 130 MMOL/L (ref 136–145)
VANCOMYCIN TROUGH SERPL-MCNC: 25.8 UG/ML (ref 10–22)
WBC # BLD AUTO: 15.5 K/UL (ref 3.9–12.7)

## 2022-10-25 PROCEDURE — 80053 COMPREHEN METABOLIC PANEL: CPT | Performed by: HOSPITALIST

## 2022-10-25 PROCEDURE — 25000003 PHARM REV CODE 250: Performed by: HOSPITALIST

## 2022-10-25 PROCEDURE — 85610 PROTHROMBIN TIME: CPT | Performed by: HOSPITALIST

## 2022-10-25 PROCEDURE — 80202 ASSAY OF VANCOMYCIN: CPT | Performed by: HOSPITALIST

## 2022-10-25 PROCEDURE — 85025 COMPLETE CBC W/AUTO DIFF WBC: CPT | Performed by: HOSPITALIST

## 2022-10-25 PROCEDURE — 99233 PR SUBSEQUENT HOSPITAL CARE,LEVL III: ICD-10-PCS | Mod: ,,, | Performed by: INTERNAL MEDICINE

## 2022-10-25 PROCEDURE — 20600001 HC STEP DOWN PRIVATE ROOM

## 2022-10-25 PROCEDURE — 63600175 PHARM REV CODE 636 W HCPCS: Mod: JG | Performed by: HOSPITALIST

## 2022-10-25 PROCEDURE — 99233 SBSQ HOSP IP/OBS HIGH 50: CPT | Mod: ,,, | Performed by: INTERNAL MEDICINE

## 2022-10-25 PROCEDURE — 83735 ASSAY OF MAGNESIUM: CPT | Performed by: HOSPITALIST

## 2022-10-25 PROCEDURE — P9047 ALBUMIN (HUMAN), 25%, 50ML: HCPCS | Mod: JG | Performed by: HOSPITALIST

## 2022-10-25 PROCEDURE — 84100 ASSAY OF PHOSPHORUS: CPT | Performed by: HOSPITALIST

## 2022-10-25 PROCEDURE — 63600175 PHARM REV CODE 636 W HCPCS: Performed by: HOSPITALIST

## 2022-10-25 RX ORDER — DOXYCYCLINE HYCLATE 100 MG
100 TABLET ORAL EVERY 12 HOURS
Status: DISCONTINUED | OUTPATIENT
Start: 2022-10-25 | End: 2022-10-25

## 2022-10-25 RX ADMIN — MIDODRINE HYDROCHLORIDE 10 MG: 5 TABLET ORAL at 07:10

## 2022-10-25 RX ADMIN — SODIUM BICARBONATE 650 MG TABLET 1300 MG: at 08:10

## 2022-10-25 RX ADMIN — MIDODRINE HYDROCHLORIDE 10 MG: 5 TABLET ORAL at 01:10

## 2022-10-25 RX ADMIN — Medication 1 CAPSULE: at 08:10

## 2022-10-25 RX ADMIN — LACTULOSE 10 G: 20 SOLUTION ORAL at 07:10

## 2022-10-25 RX ADMIN — FOLIC ACID 1000 MCG: 1 TABLET ORAL at 08:10

## 2022-10-25 RX ADMIN — PREGABALIN 75 MG: 75 CAPSULE ORAL at 08:10

## 2022-10-25 RX ADMIN — MIDODRINE HYDROCHLORIDE 10 MG: 5 TABLET ORAL at 05:10

## 2022-10-25 RX ADMIN — LACTULOSE 10 G: 20 SOLUTION ORAL at 08:10

## 2022-10-25 RX ADMIN — ALBUMIN (HUMAN) 25 G: 12.5 SOLUTION INTRAVENOUS at 07:10

## 2022-10-25 RX ADMIN — Medication 100 MG: at 08:10

## 2022-10-25 RX ADMIN — OCTREOTIDE ACETATE 100 MCG: 50 INJECTION, SOLUTION INTRAVENOUS; SUBCUTANEOUS at 07:10

## 2022-10-25 RX ADMIN — PREGABALIN 75 MG: 75 CAPSULE ORAL at 07:10

## 2022-10-25 RX ADMIN — SODIUM BICARBONATE 650 MG TABLET 1300 MG: at 01:10

## 2022-10-25 RX ADMIN — OCTREOTIDE ACETATE 100 MCG: 50 INJECTION, SOLUTION INTRAVENOUS; SUBCUTANEOUS at 05:10

## 2022-10-25 RX ADMIN — SODIUM BICARBONATE 650 MG TABLET 1300 MG: at 07:10

## 2022-10-25 RX ADMIN — ONDANSETRON 4 MG: 2 INJECTION INTRAMUSCULAR; INTRAVENOUS at 01:10

## 2022-10-25 RX ADMIN — ALBUMIN (HUMAN) 25 G: 12.5 SOLUTION INTRAVENOUS at 08:10

## 2022-10-25 RX ADMIN — OCTREOTIDE ACETATE 100 MCG: 50 INJECTION, SOLUTION INTRAVENOUS; SUBCUTANEOUS at 01:10

## 2022-10-25 RX ADMIN — MICONAZOLE NITRATE 2 % TOPICAL POWDER: at 07:10

## 2022-10-25 NOTE — ASSESSMENT & PLAN NOTE
Patient with baseline creatinine of 1.0 which worsened to creatinine of 1.4 at the time of admission to OSH and subsequently 3.8 upon transfer to [facility].  Notably, creatinine 2.4 upon repeat so suspect 3.8 was erroneous. In the setting of cirrhosis, there was some concern for hepatorenal syndrome so patient was started on albumin and midodrine.  Urine sodium (<10) consistent with hepatorenal syndrome but patient with robust blood pressures so less likely.  Lower suspicion for abdominal compartment syndrome given recent paracentesis.  Bilirubin elevated.  Suspect either pre renal versus ATN v bilirubin tubulopathy.     Cr slowly increasing, 2.8 on 10/25. MAPs maintaining in 70s-90s. S/p paracentesis with 6.3L removed.     Recommendations:   - lower suspicion but okay to continue HRS regimen at this time  - will spin urine today  - s/p albumin protocol for volume expansion  - strict intake and output  - maintain map > 65  - renally dose medications and avoid nephrotoxins when possible

## 2022-10-25 NOTE — PROGRESS NOTES
"[facility]  Nephrology  Progress Note    Patient Name: Marshall Corona  MRN: [id number]  Admission Date: 10/22/2022  Hospital Length of Stay: 3 days  Attending Provider: Sandra Neal MD   Primary Care Physician: Primary Doctor No  Principal Problem:KATHE (acute kidney injury)    Subjective:     HPI: Marshall Corona is a 47-year-old male with hypertension, diabetes, alcoholic cirrhosis who presented to OSH with weakness and abdominal distention. Patient was subsequently [facility].  During hospitalization at OSH patient noted to have right axillary abscess which was drained and he was subsequently started on clindamycin and transitioned to linezolid.  Additionally patient with worsening renal function during admission with creatinine of 1.4 on admit with subsequent worsening up to 3.8 at the time of transfer.  Outside records noting worsening renal function following paracentesis and associated hypotension for which he received albumin and was started on midodrine.  Nephrology was consulted for the management of KATHE.      Interval History: NAEON. S/p paracentesis yesterday; 6.3 L removed. Reports improved abdominal pain. 350cc UOP over the past 24 hours.     Review of patient's allergies indicates:   Allergen Reactions    Codeine Itching, Anaphylaxis and Hives    Hydrocodone-acetaminophen      "Agitation"    Influenza virus vaccines Other (See Comments)    Oxycodone-acetaminophen      "Agitation"     Current Facility-Administered Medications   Medication Frequency    acetaminophen tablet 650 mg Q8H PRN    albumin human 25% bottle 25 g BID    cefTRIAXone (ROCEPHIN) 2 g/50 mL D5W IVPB Q24H    dextrose 10% bolus 125 mL PRN    dextrose 10% bolus 250 mL PRN    folic acid tablet 1,000 mcg Daily    Lactobacillus rhamnosus GG capsule 1 capsule Daily    lactulose 20 gram/30 mL solution Soln 10 g BID    miconazole NITRATE 2 % top powder BID    miconazole " nitrate 2% ointment PRN    midodrine tablet 10 mg Q8H    octreotide injection 100 mcg Q8H    ondansetron injection 4 mg Q6H PRN    phytonadione vitamin k (AQUA-MEPHYTON) 5 mg in dextrose 5 % 50 mL IVPB Daily    pregabalin capsule 75 mg BID    prochlorperazine injection Soln 5 mg Q6H PRN    sodium bicarbonate tablet 1,300 mg TID    thiamine tablet 100 mg Daily    vancomycin - pharmacy to dose pharmacy to manage frequency       Objective:     Vital Signs (Most Recent):  Temp: 97.6 °F (36.4 °C) (10/25/22 0417)  Pulse: 97 (10/25/22 0417)  Resp: 18 (10/25/22 0417)  BP: 100/61 (10/25/22 0417)  SpO2: 96 % (10/25/22 0417)  O2 Device (Oxygen Therapy): room air (10/23/22 0706) Vital Signs (24h Range):  Temp:  [97.3 °F (36.3 °C)-98.7 °F (37.1 °C)] 97.6 °F (36.4 °C)  Pulse:  [] 97  Resp:  [17-18] 18  SpO2:  [96 %-100 %] 96 %  BP: ()/(50-71) 100/61     Weight: 99.7 kg (219 lb 12.8 oz) (10/25/22 0629)  Body mass index is 29.81 kg/m².  Body surface area is 2.25 meters squared.    I/O last 3 completed shifts:  In: 1318.9 [P.O.:1020; I.V.:198.9; IV Piggyback:100]  Out: 6750 [Urine:450; Other:6300]    Physical Exam  Constitutional:       Appearance: Normal appearance.   Eyes:      General: Scleral icterus present.      Conjunctiva/sclera: Conjunctivae normal.   Cardiovascular:      Rate and Rhythm: Normal rate and regular rhythm.      Pulses: Normal pulses.      Heart sounds: Normal heart sounds.   Pulmonary:      Effort: Pulmonary effort is normal. No respiratory distress.      Breath sounds: Normal breath sounds.   Abdominal:      General: Bowel sounds are normal. There is distension (improved post-para).      Palpations: Abdomen is soft.      Tenderness: There is no abdominal tenderness.   Musculoskeletal:      Right lower leg: No edema.      Left lower leg: No edema.   Skin:     General: Skin is warm and dry.      Coloration: Skin is jaundiced.      Findings: No bruising.   Neurological:      Mental Status:  He is alert and oriented to person, place, and time.       Significant Labs:  CBC:   Recent Labs   Lab 10/25/22  0724   WBC 15.50*   RBC 2.48*   HGB 8.7*   HCT 26.3*   *   *   MCH 35.1*   MCHC 33.1     CMP:   Recent Labs   Lab 10/25/22  0724   *   CALCIUM 8.6*   ALBUMIN 3.3*   PROT 5.5*   *   K 4.1   CO2 17*      BUN 52*   CREATININE 2.8*   ALKPHOS 139*   ALT 23   AST 57*   BILITOT 10.9*     All labs within the past 24 hours have been reviewed.     Significant Imaging:       Assessment/Plan:     * KATHE (acute kidney injury)  Patient with baseline creatinine of 1.0 which worsened to creatinine of 1.4 at the time of admission to OSH and subsequently 3.8 upon transfer to [facility].  Notably, creatinine 2.4 upon repeat so suspect 3.8 was erroneous. In the setting of cirrhosis, there was some concern for hepatorenal syndrome so patient was started on albumin and midodrine.  Urine sodium (<10) consistent with hepatorenal syndrome but patient with robust blood pressures so less likely.  Lower suspicion for abdominal compartment syndrome given recent paracentesis.  Bilirubin elevated.  Suspect either pre renal versus ATN v bilirubin tubulopathy.     Cr slowly increasing, 2.8 on 10/25. MAPs maintaining in 70s-90s. S/p paracentesis with 6.3L removed.     Recommendations:   - lower suspicion but okay to continue HRS regimen at this time  - will spin urine today  - s/p albumin protocol for volume expansion  - strict intake and output  - maintain map > 65  - renally dose medications and avoid nephrotoxins when possible      Abscess of right arm  Status post I&D at OSH  Currently on vancomycin + Ctx  - per primary    Alcoholic cirrhosis  MELD-Na score: 33 at 10/25/2022  7:24 AM  MELD score: 31 at 10/25/2022  7:24 AM  Calculated from:  Serum Creatinine: 2.8 mg/dL at 10/25/2022  7:24 AM  Serum Sodium: 130 mmol/L at 10/25/2022  7:24 AM  Total Bilirubin: 10.9 mg/dL at 10/25/2022  7:24 AM  INR(ratio): 1.7 at  10/25/2022  7:24 AM  Age: 47 years     - per primary           Thank you for your consult. I will follow-up with patient. Please contact us if you have any additional questions.    Duy Hdz MD  Nephrology  [facility]

## 2022-10-25 NOTE — PLAN OF CARE
AAOx4- lactulose continued, VSS  Liver eval contiuned; addiction psych consulted; modifiably high risk for transplant, PETH pending  Para done 10/24; >6L fluid removed, SBP labs obtained, graim stain negative  PERLA axillae dressing changed 10/24, plan to change MWF per WC orders; WBC trending up; IV rocephin continued  Nephrology following, very minimal UOP overnight; Cr 2.6- IV albumin given  PT/OT following, cont to work with them while in patient  Per hepatology, plan to hold off initiation of transplant until full eval completed  Non skid socks worn, call light within reach, will cont to monitor

## 2022-10-25 NOTE — PLAN OF CARE
Pt AAO x 4. Pt up with assistance throughout shift. PETH pending. Pt awaiting liver evaluation. Pt free from falls and injury throughout shift.

## 2022-10-25 NOTE — CONSULTS
Palliative Medicine Consult.    Consult received and case discussed with Dr. Neal. Consult to be completed tomorrow.    Thank you for allowing us to be a part of the care of this patient.    DIOMEDES Shanks, FNP-C  Palliative Medicine ext. [phone]

## 2022-10-25 NOTE — PROGRESS NOTES
Progress Note  Hospital Medicine      Patient Name: Marshall Corona  MRN: [id number]  Patient Class: IP- Inpatient     Admission Date: 10/15/2022  Length of Stay: 6 days  Attending Physician: Cecil Villalta MD  Primary Care Provider: Primary Doctor No    SUBJECTIVE:     Follow-up For:  KATHE (acute kidney injury)     Interval history/ROS:   10/25 appears not currently planning on opening transplant eval.     1024: to paracentesis today. Family in from [place]. We discussed concerns related to transplant candidacy - short period of sobriety, minimal social support and progressive debility.     HPI:  Briefly, is a 47-year-old male with history of hypertension, diabetes, presumed alcoholic cirrhosis who initially presents to the emergency department today with complaint of generalized weakness.  Patient reports he was recently admitted to outside facility with anemia and weakness.  He reports he was transfuse packed red blood cells at that time.  He also underwent paracentesis and EGD which was reportedly unrevealing although no records are available for review at this time.  He was discharged approximately 3 days ago and has reported continued weakness and near syncopal episode today.  He denies any current chest pain, shortness of breath, fevers, chills, nausea, vomiting, diarrhea, hematochezia or melanotic stools.  Patient was noted to have a hemoglobin of 7.2 in the emergency department.  The severity is severe nature subacute onset with no alleviating symptoms appreciated.        Overview/Hospital Course:  47-year-old male with history of hypertension, diabetes, presumed alcoholic cirrhosis initially presents to the emergency department with generalized weakness.  He was recently admitted to outside facility with anemia and weakness.  He was transfuse packed red blood cells at that facility and underwent paracentesis an EGD which was reportedly unrevealing.  Hemoglobin emergency department was noted to be 7.2.   Patient was admitted to the inpatient unit and transfuse 2 units packed red blood cells with consultation to Gastroenterology.  CT abdomen and right upper quadrant ultrasound consistent with cirrhosis.  GI has recommended outpatient follow-up.  Patient was noted to have leukocytosis during his hospitalization with right axillary abscess.  He is placed on empiric broad-spectrum IV antibiotic coverage and underwent I&D by General surgery.  Renal function has worsened and thus nephrology consulted on 10/20.       OBJECTIVE:     Body mass index is 29.81 kg/m².    Vital Signs Range (Last 24H):  Temp:  [97.5 °F (36.4 °C)-98 °F (36.7 °C)]   Pulse:  []   Resp:  [18]   BP: ()/(50-71)   SpO2:  [96 %-100 %]     I & O (Last 24H):    Intake/Output Summary (Last 24 hours) at 10/25/2022 1648  Last data filed at 10/25/2022 0629  Gross per 24 hour   Intake 558.86 ml   Output 100 ml   Net 458.86 ml          Physical Exam:  Vitals and nursing note reviewed.   GEN: awake, alert, comfortable, NAD  EYES: PERRL. Icteric sclera.   ENT: OP clear, MMM.   CV: RRR, no murmur appreciated   PULM: CTAb, no wheezes, on RA  ABD: Normoactive bowel sounds. Abdomen is distended, mild TTP worse on left upper and lower  EXT: Symmetric, atraumatic, no LE edema.   SKIN: normal turgor, no rashes; right axillary abscess bandaged, dry  PSYCH: flat affect    Recent Labs   Lab 10/23/22  0619 10/24/22  0836 10/25/22  0724   * 129* 130*   K 4.3 4.3 4.1   CL 99 99 101   CO2 20* 20* 17*   BUN 53* 52* 52*   CREATININE 2.4* 2.6* 2.8*   * 135* 151*   CALCIUM 7.9* 9.0 8.6*   MG 1.7 1.8 1.8   PHOS 4.2 5.1* 4.9*       Recent Labs   Lab 10/23/22  0618 10/23/22  0619 10/24/22  0836 10/25/22  0724   ALKPHOS  --  171* 147* 139*   ALT  --  25 26 23   AST  --  54* 74* 57*   ALBUMIN  --  2.6* 3.5 3.3*   PROT  --  5.4* 5.9* 5.5*   BILITOT  --  9.7* 13.2* 10.9*   INR 1.7*  --  1.6* 1.7*         Recent Labs   Lab 10/23/22  0618 10/24/22  0836  10/24/22  1032 10/25/22  0724   WBC 14.85* 17.42*  --  15.50*   HGB 8.3* 8.2*  --  8.7*   HCT 24.8* 25.1*  --  26.3*   * 132*  --  104*   GRAN 74.3*  11.0* 73.8*  12.8*  --  76.3*  11.8*   SEGS  --   --  18  --    LYMPH 11.4*  1.7 11.8*  2.1  --  9.3*  1.4   LYMPHS  --   --  25  --    MONO 10.0  1.5* 9.1  1.6*  --  9.2  1.4*         Recent Labs   Lab 10/18/22  2124   POCTGLUCOSE 111*         No results for input(s): TROPONINI in the last 168 hours.    Diagnostic Results:  Labs: Reviewed    albumin human 25%, 25 g, Intravenous, BID  folic acid, 1,000 mcg, Oral, Daily  Lactobacillus rhamnosus GG, 1 capsule, Oral, Daily  lactulose, 10 g, Oral, BID  miconazole NITRATE 2 %, , Topical (Top), BID  midodrine, 10 mg, Oral, Q8H  octreotide, 100 mcg, Subcutaneous, Q8H  pregabalin, 75 mg, Oral, BID  sodium bicarbonate, 1,300 mg, Oral, TID  thiamine, 100 mg, Oral, Daily      As Needed acetaminophen, dextrose 10%, dextrose 10%, miconazole nitrate 2%, ondansetron, prochlorperazine    ASSESSMENT/PLAN:     Assessment: Marshall Corona is a 47 y.o. male here with:     Active Hospital Problems    Diagnosis  POA    *KATHE (acute kidney injury) [N17.9]  Yes    Alcohol dependence in remission [F10.21]  Yes     Chronic    Decompensated hepatic cirrhosis [K72.90, K74.60]  Yes    Abscess of right arm [L02.413]  Yes    Hyponatremia [E87.1]  Yes    Debility [R53.81]  Yes    Alcoholic cirrhosis [K70.30]  Yes    Benign essential HTN [I10]  Yes    Symptomatic anemia [D64.9]  Yes      Resolved Hospital Problems   No resolved problems to display.        Plan:     KATHE (acute kidney injury)  Appears to be consistent with hepatorenal syndrome  Worsening GFR with metabolic acidosis and hyperkalemia   Patient and sister, Rah, informed  Cont midodrine, albumin, octreotide        Alcoholic cirrhosis  New diagnosis cirrhosis, likely 2/2 heavy EtOH use  HCV negative  DF > 32, may benefit from steroids however concern for active infection  with WBC 19K  MELD = 35    MELD-Na score: 33 at 10/25/2022  7:24 AM  MELD score: 31 at 10/25/2022  7:24 AM  Calculated from:  Serum Creatinine: 2.8 mg/dL at 10/25/2022  7:24 AM  Serum Sodium: 130 mmol/L at 10/25/2022  7:24 AM  Total Bilirubin: 10.9 mg/dL at 10/25/2022  7:24 AM  INR(ratio): 1.7 at 10/25/2022  7:24 AM  Age: 47 years      - paracentesis, on rocephin  Thiamine  Vitamin K  Midodrine  Lactulose/rifaximine.     Symptomatic anemia  Hemoglobin improved post pRBC transfusion and has remained stable  No overt bleeding  Recent EGD at [facility]-- reportedly normal, will request records  Plan cscope as outpatient once medically optimized per GI     Hyponatremia  asymptomatic.  Now improved.     Abscess of right arm  Patient status post I&D on 10/18.    Cultures NGTD  WBC rising-- abx changed from clinda (compelted 3 days) to linezolid on 10/20  Add Rocephin      Debility  Continue aggressive PT/OT.  Work towards placement.     Thrombocytopenia  Likely secondary to underlying cirrhosis.  Monitor closely and transfuse as indicated.     Benign essential HTN  BP stable off of home antihypertensive regimen.        VTE Risk Mitigation (From admission, onward)           HIGH RISK CONDITION(S):  Patient has a condition that poses threat to life and bodily function: Acute Renal Failure        Sandra Neal MD

## 2022-10-25 NOTE — ASSESSMENT & PLAN NOTE
MELD-Na score: 33 at 10/25/2022  7:24 AM  MELD score: 31 at 10/25/2022  7:24 AM  Calculated from:  Serum Creatinine: 2.8 mg/dL at 10/25/2022  7:24 AM  Serum Sodium: 130 mmol/L at 10/25/2022  7:24 AM  Total Bilirubin: 10.9 mg/dL at 10/25/2022  7:24 AM  INR(ratio): 1.7 at 10/25/2022  7:24 AM  Age: 47 years     - per primary

## 2022-10-25 NOTE — SUBJECTIVE & OBJECTIVE
"Interval History: NAEON. S/p paracentesis yesterday; 6.3 L removed. Reports improved abdominal pain. 350cc UOP over the past 24 hours.     Review of patient's allergies indicates:   Allergen Reactions    Codeine Itching, Anaphylaxis and Hives    Hydrocodone-acetaminophen      "Agitation"    Influenza virus vaccines Other (See Comments)    Oxycodone-acetaminophen      "Agitation"     Current Facility-Administered Medications   Medication Frequency    acetaminophen tablet 650 mg Q8H PRN    albumin human 25% bottle 25 g BID    cefTRIAXone (ROCEPHIN) 2 g/50 mL D5W IVPB Q24H    dextrose 10% bolus 125 mL PRN    dextrose 10% bolus 250 mL PRN    folic acid tablet 1,000 mcg Daily    Lactobacillus rhamnosus GG capsule 1 capsule Daily    lactulose 20 gram/30 mL solution Soln 10 g BID    miconazole NITRATE 2 % top powder BID    miconazole nitrate 2% ointment PRN    midodrine tablet 10 mg Q8H    octreotide injection 100 mcg Q8H    ondansetron injection 4 mg Q6H PRN    phytonadione vitamin k (AQUA-MEPHYTON) 5 mg in dextrose 5 % 50 mL IVPB Daily    pregabalin capsule 75 mg BID    prochlorperazine injection Soln 5 mg Q6H PRN    sodium bicarbonate tablet 1,300 mg TID    thiamine tablet 100 mg Daily    vancomycin - pharmacy to dose pharmacy to manage frequency       Objective:     Vital Signs (Most Recent):  Temp: 97.6 °F (36.4 °C) (10/25/22 0417)  Pulse: 97 (10/25/22 0417)  Resp: 18 (10/25/22 0417)  BP: 100/61 (10/25/22 0417)  SpO2: 96 % (10/25/22 0417)  O2 Device (Oxygen Therapy): room air (10/23/22 0706) Vital Signs (24h Range):  Temp:  [97.3 °F (36.3 °C)-98.7 °F (37.1 °C)] 97.6 °F (36.4 °C)  Pulse:  [] 97  Resp:  [17-18] 18  SpO2:  [96 %-100 %] 96 %  BP: ()/(50-71) 100/61     Weight: 99.7 kg (219 lb 12.8 oz) (10/25/22 0629)  Body mass index is 29.81 kg/m².  Body surface area is 2.25 meters squared.    I/O last 3 completed shifts:  In: 1318.9 [P.O.:1020; I.V.:198.9; IV Piggyback:100]  Out: 6750 [Urine:450; " Other:6300]    Physical Exam  Constitutional:       Appearance: Normal appearance.   Eyes:      General: Scleral icterus present.      Conjunctiva/sclera: Conjunctivae normal.   Cardiovascular:      Rate and Rhythm: Normal rate and regular rhythm.      Pulses: Normal pulses.      Heart sounds: Normal heart sounds.   Pulmonary:      Effort: Pulmonary effort is normal. No respiratory distress.      Breath sounds: Normal breath sounds.   Abdominal:      General: Bowel sounds are normal. There is distension (improved post-para).      Palpations: Abdomen is soft.      Tenderness: There is no abdominal tenderness.   Musculoskeletal:      Right lower leg: No edema.      Left lower leg: No edema.   Skin:     General: Skin is warm and dry.      Coloration: Skin is jaundiced.      Findings: No bruising.   Neurological:      Mental Status: He is alert and oriented to person, place, and time.       Significant Labs:  CBC:   Recent Labs   Lab 10/25/22  0724   WBC 15.50*   RBC 2.48*   HGB 8.7*   HCT 26.3*   *   *   MCH 35.1*   MCHC 33.1     CMP:   Recent Labs   Lab 10/25/22  0724   *   CALCIUM 8.6*   ALBUMIN 3.3*   PROT 5.5*   *   K 4.1   CO2 17*      BUN 52*   CREATININE 2.8*   ALKPHOS 139*   ALT 23   AST 57*   BILITOT 10.9*     All labs within the past 24 hours have been reviewed.     Significant Imaging:

## 2022-10-25 NOTE — PROGRESS NOTES
"[facility]  Gastroenterology  Progress Note    Patient Name: Marshall Corona  MRN: [id number]  Admission Date: 10/22/2022  Hospital Length of Stay: 3 days    Subjective:     Interval History:  NAEON. Nephrology believes likely HRS. Plan to discuss at mini meeting today. TTE normal.     VS w/o fever or hypotension.   CBC w/ persistent leukocytosis 15k. BMP w/ Na 130. Cr worse 2.6-->2.8. INR 1.7. LFTs w/ T bili 13.2-->10.9. AST/ ALT 57/ 23.     MELD-Na 33    Receiving albumin, midodrine, octreotide.     No current facility-administered medications on file prior to encounter.     Current Outpatient Medications on File Prior to Encounter   Medication Sig Dispense Refill    amitriptyline (ELAVIL) 50 MG tablet Take 50 mg by mouth every evening.      benazepriL (LOTENSIN) 20 MG tablet Take 20 mg by mouth 2 (two) times daily.      bisoprolol (ZEBETA) 5 MG tablet Take 5 mg by mouth once daily.      folic acid (FOLVITE) 1 MG tablet Take 1,000 mcg by mouth once daily.      lactulose (CHRONULAC) 10 gram/15 mL solution Take 15 mLs by mouth 2 (two) times daily.      nystatin (MYCOSTATIN) cream Apply 1 g topically 2 (two) times daily.      pantoprazole (PROTONIX) 40 MG tablet Take 40 mg by mouth 2 (two) times daily.      pregabalin (LYRICA) 100 MG capsule Take 100 mg by mouth 3 (three) times daily.      fluticasone propionate (FLONASE) 50 mcg/actuation nasal spray 1 spray (50 mcg total) by Each Nostril route 2 (two) times daily. 16 g 1    multivitamin (ONE DAILY MULTIVITAMIN) per tablet Take 1 tablet by mouth once daily.         Review of patient's allergies indicates:   Allergen Reactions    Codeine Itching, Anaphylaxis and Hives    Hydrocodone-acetaminophen      "Agitation"    Influenza virus vaccines Other (See Comments)    Oxycodone-acetaminophen      "Agitation"         Objective:     Vitals:    10/25/22 1111   BP: 114/71   Pulse: 100   Resp:    Temp: 97.9 °F (36.6 °C)       Constitutional:  not in acute " distress and well developed  HENT: Head: Normal, normocephalic, atraumatic.  Eyes: conjunctiva clear and sclera nonicteric  Cardiovascular: regular rate and rhythm  Respiratory: normal chest expansion & respiratory effort   and no accessory muscle use  GI: soft, non-tender, without masses or organomegaly  Musculoskeletal: no muscular tenderness noted  Skin: normal color  Neurological: alert, oriented x3  Psychiatric: mood and affect are within normal limits, pt is a good historian; no memory problems were noted    Significant Labs:  Recent Labs   Lab 10/23/22  0618 10/24/22  0836 10/25/22  0724   HGB 8.3* 8.2* 8.7*       Lab Results   Component Value Date    WBC 15.50 (H) 10/25/2022    HGB 8.7 (L) 10/25/2022    HCT 26.3 (L) 10/25/2022     (H) 10/25/2022     (L) 10/25/2022       Lab Results   Component Value Date     (L) 10/25/2022    K 4.1 10/25/2022     10/25/2022    CO2 17 (L) 10/25/2022    BUN 52 (H) 10/25/2022    CREATININE 2.8 (H) 10/25/2022    CALCIUM 8.6 (L) 10/25/2022    ANIONGAP 12 10/25/2022       Lab Results   Component Value Date    ALT 23 10/25/2022    AST 57 (H) 10/25/2022    ALKPHOS 139 (H) 10/25/2022    BILITOT 10.9 (H) 10/25/2022       Lab Results   Component Value Date    INR 1.7 (H) 10/25/2022    INR 1.6 (H) 10/24/2022    INR 1.7 (H) 10/23/2022       Significant Imaging:  Reviewed pertinent radiology findings.       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 Presentation here notable for evidence of ascites, improving KATHE, and MELD >30.      Recommendations:   -Follow-up serological work-up for other etiologies of liver disease.   -Follow-up PETH and evaluation by addiction psychiatry.   -Follow-up fluid studies from paracentesis.   -Follow-up nephrology recommendations for KATHE management.  -Midodrine TID.  -Lactulose TID and Rifaximin BID for encephalopathy management; titrate Lactulose to 3 bowel movements daily.   -PT/OT to assess functional status.   -CMP and INR daily.   -Avoid use of medications that may precipitate encephalopathy (e.g. opioids and benzo's).   -Given high MELD score, patient warrants consideration for transplant. However will hold off on initiation of transplant evaluation until further work-up of liver disease, evaluation by addiction psychiatry, PT/OT assessment, and evaluation by transplant .    Thank you for involving us in the care of Marshall Corona. Please call with any additional questions, concerns or changes in the patient's clinical status. We will continue to follow.     Drew Patel MD  Gastroenterology Fellow PGY IV   [facility]

## 2022-10-25 NOTE — PROGRESS NOTES
Therapy with Vancomycin complete and/or consult discontinued by provider.  Pharmacy will sign off, please re-consult as needed.

## 2022-10-25 NOTE — PROGRESS NOTES
Pharmacokinetic Assessment Follow Up: IV Vancomycin    Vancomycin serum concentration assessment(s):    Vancomycin random=25.8 mcg/mL    Vancomycin Regimen Plan:    Pt still in KATHE and level above goal of 10-15 mcg/ml, will not redose today.  Vanc random in the AM.    Drug levels (last 3 results):  Recent Labs   Lab Result Units 10/23/22  1454 10/25/22  0724   Vancomycin, Random ug/mL 18.5  --    Vancomycin-Trough ug/mL  --  25.8*       Pharmacy will continue to follow and monitor vancomycin.    Thank you for the consult,   Carmenza Nolasco, PharmD, [facility]  [id number]     Patient brief summary:  Marshall Corona is a 47 y.o. male initiated on antimicrobial therapy with IV Vancomycin for treatment of skin & soft tissue infection      Actual Body Weight:   99kg    Renal Function:   Estimated Creatinine Clearance: 39 mL/min (A) (based on SCr of 2.8 mg/dL (H)).,       CBC (last 72 hours):  Recent Labs   Lab Result Units 10/22/22  1535 10/23/22  0618 10/24/22  0836   WBC K/uL 21.07* 14.85* 17.42*   Hemoglobin g/dL 9.5* 8.3* 8.2*   Hematocrit % 28.5* 24.8* 25.1*   Platelets K/uL 149* 114* 132*   Gran % % 80.1* 74.3* 73.8*   Lymph % % 8.1* 11.4* 11.8*   Mono % % 8.5 10.0 9.1   Eosinophil % % 2.4 3.1 4.3   Basophil % % 0.4 0.7 0.6   Differential Method  Automated Automated Automated       Metabolic Panel (last 72 hours):  Recent Labs   Lab Result Units 10/22/22  1050 10/22/22  1317 10/22/22  1649 10/22/22  2345 10/22/22  2346 10/23/22  0619 10/24/22  0836 10/25/22  0724   Sodium mmol/L 133*  --   --   --   --  128* 129* 130*   Sodium, Urine mmol/L  --   --  <5* <10*  --   --   --   --    Potassium mmol/L 6.1* 4.5  --   --   --  4.3 4.3 4.1   Potassium, Urine mmol/L  --   --   --  48  --   --   --   --    Chloride mmol/L 106  --   --   --   --  99 99 101   Chloride, Urine mmol/L  --   --   --  <20*  --   --   --   --    CO2 mmol/L 14*  --   --   --   --  20* 20* 17*   Glucose mg/dL 126*  --   --   --   --  125* 135* 151*   Glucose, UA    --   --  Negative  --  Negative  --   --   --    BUN mg/dL 58*  --   --   --   --  53* 52* 52*   Creatinine mg/dL 3.80*  --   --   --   --  2.4* 2.6* 2.8*   Creatinine, Urine mg/dL  --   --   --  163.0  163.0  --   --   --   --    Albumin g/dL 2.5*  --   --   --   --  2.6* 3.5 3.3*   Total Bilirubin mg/dL 10.2*  --   --   --   --  9.7* 13.2* 10.9*   Alkaline Phosphatase U/L 189*  --   --   --   --  171* 147* 139*   AST U/L 88*  --   --   --   --  54* 74* 57*   ALT U/L 32  --   --   --   --  25 26 23   Magnesium mg/dL  --   --   --   --   --  1.7 1.8 1.8   Phosphorus mg/dL  --   --   --   --   --  4.2 5.1* 4.9*       Vancomycin Administrations:  vancomycin given in the last 96 hours                     vancomycin 1.5 g in dextrose 5 % 250 mL IVPB (ready to mix) (mg) 1,500 mg New Bag 10/24/22 0143    vancomycin 2 g in dextrose 5 % 500 mL IVPB (mg) 2,000 mg New Bag 10/23/22 0254                    Microbiologic Results:  Microbiology Results (last 7 days)       Procedure Component Value Units Date/Time    (rule out SBP) Culture, Anaerobic [[id number]] Collected: 10/24/22 1032    Order Status: Completed Specimen: Ascites Updated: 10/25/22 0634     Anaerobic Culture Culture in progress    Narrative:      To rule out SBP order labs: Aerobic culture [[id number]],  Culture, Anaerobic [[id number]], Gram stain [OBJ846], Albumin  [MBL170], Protein [UNY833], LDH [GAN031], WBC \T\ Dff  [JXY1521].    (rule out SBP) Aerobic culture [[id number]] Collected: 10/24/22 1031    Order Status: Completed Specimen: Ascites Updated: 10/25/22 0630     Aerobic Bacterial Culture No growth    Narrative:      To rule out SBP order labs: Aerobic culture [[id number]],  Culture, Anaerobic [EOM971], Gram stain [ITF686], Albumin  [VQH257], Protein [FGD594], LDH [ZQX288], WBC \T\ Dff  [ALD4281].    Urine culture [[id number]] Collected: 10/22/22 7516    Order Status: Completed Specimen: Urine Updated: 10/24/22 1945     Urine Culture, Routine No growth    Narrative:       Specimen Source->Urine    (rule out SBP) Gram stain [[id number]] Collected: 10/24/22 1032    Order Status: Completed Specimen: Ascites Updated: 10/24/22 1832     Gram Stain Result No WBC's      No organisms seen    Narrative:      To rule out SBP order labs: Aerobic culture [[id number]],  Culture, Anaerobic [JBC612], Gram stain [TMB158], Albumin  [QIG229], Protein [ECF662], LDH [TPN289], WBC \T\ Dff  [JAZ1765].    Gram stain [[id number]] Collected: 10/22/22 2346    Order Status: Completed Specimen: Urine Updated: 10/23/22 1448     Gram Stain Result No WBC's      No organisms seen    Gram stain [[id number]]     Order Status: Completed Specimen: Urine

## 2022-10-26 PROBLEM — Z71.89 COUNSELING REGARDING ADVANCED CARE PLANNING AND GOALS OF CARE: Status: ACTIVE | Noted: 2022-10-26

## 2022-10-26 PROBLEM — Z51.5 ENCOUNTER FOR PALLIATIVE CARE: Status: ACTIVE | Noted: 2022-10-26

## 2022-10-26 LAB
ACANTHOCYTES BLD QL SMEAR: PRESENT
ALBUMIN SERPL BCP-MCNC: 3.4 G/DL (ref 3.5–5.2)
ALP SERPL-CCNC: 144 U/L (ref 55–135)
ALT SERPL W/O P-5'-P-CCNC: 22 U/L (ref 10–44)
ANION GAP SERPL CALC-SCNC: 14 MMOL/L (ref 8–16)
ANISOCYTOSIS BLD QL SMEAR: SLIGHT
AST SERPL-CCNC: 51 U/L (ref 10–40)
BASOPHILS # BLD AUTO: 0.1 K/UL (ref 0–0.2)
BASOPHILS NFR BLD: 0.5 % (ref 0–1.9)
BILIRUB SERPL-MCNC: 11 MG/DL (ref 0.1–1)
BUN SERPL-MCNC: 53 MG/DL (ref 6–20)
CALCIUM SERPL-MCNC: 8.7 MG/DL (ref 8.7–10.5)
CHLORIDE SERPL-SCNC: 99 MMOL/L (ref 95–110)
CO2 SERPL-SCNC: 19 MMOL/L (ref 23–29)
COPPER SERPL-MCNC: 465 UG/L (ref 665–1480)
CREAT SERPL-MCNC: 3.3 MG/DL (ref 0.5–1.4)
DACRYOCYTES BLD QL SMEAR: ABNORMAL
DIFFERENTIAL METHOD: ABNORMAL
EBV EA IGG SER-ACNC: <5 U/ML
EBV NA IGG SER-ACNC: <3 U/ML
EBV VCA IGG SER-ACNC: 62.1 U/ML
EBV VCA IGM SER-ACNC: <10 U/ML
EOSINOPHIL # BLD AUTO: 0.7 K/UL (ref 0–0.5)
EOSINOPHIL NFR BLD: 3.3 % (ref 0–8)
ERYTHROCYTE [DISTWIDTH] IN BLOOD BY AUTOMATED COUNT: 18.8 % (ref 11.5–14.5)
EST. GFR  (NO RACE VARIABLE): 22 ML/MIN/1.73 M^2
GLUCOSE SERPL-MCNC: 158 MG/DL (ref 70–110)
HCT VFR BLD AUTO: 26.3 % (ref 40–54)
HGB BLD-MCNC: 8.6 G/DL (ref 14–18)
HYPOCHROMIA BLD QL SMEAR: ABNORMAL
IMM GRANULOCYTES # BLD AUTO: 0.1 K/UL (ref 0–0.04)
IMM GRANULOCYTES NFR BLD AUTO: 0.5 % (ref 0–0.5)
INR PPP: 1.7 (ref 0.8–1.2)
LKM AB SER-ACNC: 1.6 UNITS
LYMPHOCYTES # BLD AUTO: 1.5 K/UL (ref 1–4.8)
LYMPHOCYTES NFR BLD: 7.1 % (ref 18–48)
MCH RBC QN AUTO: 34.3 PG (ref 27–31)
MCHC RBC AUTO-ENTMCNC: 32.7 G/DL (ref 32–36)
MCV RBC AUTO: 105 FL (ref 82–98)
MONOCYTES # BLD AUTO: 1.9 K/UL (ref 0.3–1)
MONOCYTES NFR BLD: 8.9 % (ref 4–15)
NEUTROPHILS # BLD AUTO: 17.1 K/UL (ref 1.8–7.7)
NEUTROPHILS NFR BLD: 79.7 % (ref 38–73)
NRBC BLD-RTO: 0 /100 WBC
OVALOCYTES BLD QL SMEAR: ABNORMAL
PLATELET # BLD AUTO: 118 K/UL (ref 150–450)
PLATELET BLD QL SMEAR: ABNORMAL
PMV BLD AUTO: 12.8 FL (ref 9.2–12.9)
POIKILOCYTOSIS BLD QL SMEAR: SLIGHT
POLYCHROMASIA BLD QL SMEAR: ABNORMAL
POTASSIUM SERPL-SCNC: 4 MMOL/L (ref 3.5–5.1)
PROT SERPL-MCNC: 5.6 G/DL (ref 6–8.4)
PROTHROMBIN TIME: 16.7 SEC (ref 9–12.5)
RBC # BLD AUTO: 2.51 M/UL (ref 4.6–6.2)
SCHISTOCYTES BLD QL SMEAR: PRESENT
SODIUM SERPL-SCNC: 132 MMOL/L (ref 136–145)
TARGETS BLD QL SMEAR: ABNORMAL
WBC # BLD AUTO: 21.44 K/UL (ref 3.9–12.7)

## 2022-10-26 PROCEDURE — 99498 ADVNCD CARE PLAN ADDL 30 MIN: CPT | Mod: ,,, | Performed by: NURSE PRACTITIONER

## 2022-10-26 PROCEDURE — 99223 1ST HOSP IP/OBS HIGH 75: CPT | Mod: ,,, | Performed by: NURSE PRACTITIONER

## 2022-10-26 PROCEDURE — 80053 COMPREHEN METABOLIC PANEL: CPT | Performed by: HOSPITALIST

## 2022-10-26 PROCEDURE — 99232 SBSQ HOSP IP/OBS MODERATE 35: CPT | Mod: ,,, | Performed by: HOSPITALIST

## 2022-10-26 PROCEDURE — 85025 COMPLETE CBC W/AUTO DIFF WBC: CPT | Performed by: HOSPITALIST

## 2022-10-26 PROCEDURE — 97116 GAIT TRAINING THERAPY: CPT | Mod: CQ

## 2022-10-26 PROCEDURE — 99497 ADVNCD CARE PLAN 30 MIN: CPT | Mod: 25,,, | Performed by: NURSE PRACTITIONER

## 2022-10-26 PROCEDURE — 99232 PR SUBSEQUENT HOSPITAL CARE,LEVL II: ICD-10-PCS | Mod: ,,, | Performed by: HOSPITALIST

## 2022-10-26 PROCEDURE — 63600175 PHARM REV CODE 636 W HCPCS: Mod: JB | Performed by: HOSPITALIST

## 2022-10-26 PROCEDURE — 85610 PROTHROMBIN TIME: CPT | Performed by: HOSPITALIST

## 2022-10-26 PROCEDURE — 97530 THERAPEUTIC ACTIVITIES: CPT | Mod: CQ

## 2022-10-26 PROCEDURE — 99223 PR INITIAL HOSPITAL CARE,LEVL III: ICD-10-PCS | Mod: ,,, | Performed by: NURSE PRACTITIONER

## 2022-10-26 PROCEDURE — 20600001 HC STEP DOWN PRIVATE ROOM

## 2022-10-26 PROCEDURE — 97535 SELF CARE MNGMENT TRAINING: CPT

## 2022-10-26 PROCEDURE — 99497 PR ADVNCD CARE PLAN 30 MIN: ICD-10-PCS | Mod: 25,,, | Performed by: NURSE PRACTITIONER

## 2022-10-26 PROCEDURE — 36415 COLL VENOUS BLD VENIPUNCTURE: CPT | Performed by: HOSPITALIST

## 2022-10-26 PROCEDURE — 99498 PR ADVNCD CARE PLAN ADDL 30 MIN: ICD-10-PCS | Mod: ,,, | Performed by: NURSE PRACTITIONER

## 2022-10-26 PROCEDURE — 25000003 PHARM REV CODE 250: Performed by: HOSPITALIST

## 2022-10-26 RX ORDER — OCTREOTIDE ACETATE 100 UG/ML
100 INJECTION, SOLUTION INTRAVENOUS; SUBCUTANEOUS EVERY 8 HOURS
Status: DISCONTINUED | OUTPATIENT
Start: 2022-10-26 | End: 2022-11-01 | Stop reason: HOSPADM

## 2022-10-26 RX ADMIN — PREGABALIN 75 MG: 75 CAPSULE ORAL at 09:10

## 2022-10-26 RX ADMIN — SODIUM BICARBONATE 650 MG TABLET 1300 MG: at 09:10

## 2022-10-26 RX ADMIN — LACTULOSE 10 G: 20 SOLUTION ORAL at 09:10

## 2022-10-26 RX ADMIN — OCTREOTIDE ACETATE 100 MCG: 50 INJECTION, SOLUTION INTRAVENOUS; SUBCUTANEOUS at 05:10

## 2022-10-26 RX ADMIN — OCTREOTIDE ACETATE 100 MCG: 100 INJECTION, SOLUTION INTRAVENOUS; SUBCUTANEOUS at 09:10

## 2022-10-26 RX ADMIN — Medication 100 MG: at 08:10

## 2022-10-26 RX ADMIN — MIDODRINE HYDROCHLORIDE 10 MG: 5 TABLET ORAL at 09:10

## 2022-10-26 RX ADMIN — SODIUM BICARBONATE 650 MG TABLET 1300 MG: at 03:10

## 2022-10-26 RX ADMIN — MIDODRINE HYDROCHLORIDE 10 MG: 5 TABLET ORAL at 02:10

## 2022-10-26 RX ADMIN — LACTULOSE 10 G: 20 SOLUTION ORAL at 08:10

## 2022-10-26 RX ADMIN — PREGABALIN 75 MG: 75 CAPSULE ORAL at 08:10

## 2022-10-26 RX ADMIN — SODIUM BICARBONATE 650 MG TABLET 1300 MG: at 08:10

## 2022-10-26 RX ADMIN — Medication 1 CAPSULE: at 08:10

## 2022-10-26 RX ADMIN — MIDODRINE HYDROCHLORIDE 10 MG: 5 TABLET ORAL at 05:10

## 2022-10-26 RX ADMIN — OCTREOTIDE ACETATE 100 MCG: 100 INJECTION, SOLUTION INTRAVENOUS; SUBCUTANEOUS at 03:10

## 2022-10-26 RX ADMIN — FOLIC ACID 1000 MCG: 1 TABLET ORAL at 08:10

## 2022-10-26 NOTE — PLAN OF CARE
Problem: Adult Inpatient Plan of Care  Goal: Plan of Care Review  Outcome: Ongoing, Progressing     Problem: Adult Inpatient Plan of Care  Goal: Optimal Comfort and Wellbeing  Outcome: Ongoing, Progressing     Problem: Impaired Wound Healing  Goal: Optimal Wound Healing  Outcome: Ongoing, Progressing  POC and meds reviewed.All concerns addressed.Will continue to monitor.

## 2022-10-26 NOTE — ASSESSMENT & PLAN NOTE
Patient with baseline creatinine of 1.0 which worsened to creatinine of 1.4 at the time of admission to OSH and subsequently 3.8 [place] [facility]  Notably, creatinine 2.4 upon repeat so suspect 3.8 was erroneous. In the setting of cirrhosis, there was some concern for hepatorenal syndrome so patient was started on albumin and midodrine.  Urine sodium (<10) consistent with hepatorenal syndrome but patient with robust blood pressures so less likely.  Lower suspicion for abdominal compartment syndrome given recent paracentesis.  Bilirubin elevated.  Suspect either HRS versus ATN v bilirubin tubulopathy.     Cr continues to increase, 3.3 on 10/25. MAPs maintaining in 70s-90s. S/p paracentesis with 6.3L removed on 10/24.     Recommendations:   - okay to continue HRS regimen at this time  - s/p albumin protocol for volume expansion  - strict intake and output  - maintain map > 65  - renally dose medications and avoid nephrotoxins when possible

## 2022-10-26 NOTE — PROGRESS NOTES
Liver Transplant  Consult Note:    SW asked by transplant hepatologist to see pt for brief assessment.    Pt's mother and sister were at bedside today.    Lisa Jamil, pt sister, c: [phone]  Olga Mendez, pt mother, c: [phone]    Pt's family lives in [place] and are driving back today after they pack up his apt. Pt's lease is up and he states that when he is discharged he will be staying locally with a friend, Ofelia Saravia. Ofelia works and would not be able to be a caregiver.    SW is unclear if pt is a medical candidate at this point. He is very deconditioned and has a history of Guillain-[place] syndrome, however, pt believes his current deconditioning is more related to his liver disease. Pt started drinking when he was around 19, he states that at his most he was drinking 4-5 vodka drinks/day. Pt states he does not smoke.    Pt's last drink was 10 weeks ago, per report. Pt began feeling ill at that time.    RN coordinator saw in pt's outside medical records that in 2019 he was advised by an MD to stop drinking and offered Naltrexone. Pt denied this yesterday, 10/25/22, but when questioned again today he did admit to remembering it. Pt continued drinking at that time, and states that a year ago he was also advised to stop drinking, and did not.    Pt has not engaged in AA or rehab. Pt has [place] Medicaid. Pt and family state long-term plan is for him to return home to [place]. Pt states his doctors have told him he is not stable enough for that,at this time.    Case management social workers are trying to get pt placed in SNF, per documentation.     SW provided education on need for alcohol treatment, adequate primary and back-up caregivers as well as finances to afford transplant expenses. Pt's family does not want a lack of caregivers to prevent him from being transplanted and stated they would do what they had to. SILVESTRE educated that SW unsure if pt was a candidate and re-iterated the  "importance of treatment and sobriety. Family requested to speak to hepatology, SW notified LTS team.    Pt has also been arrested for getting into a fight with an off duty . Pt stated that the officer started and that pt "kicked his a**". Pt reports he only spent several hours in [facility].    Pt appears to have very limited insight into his history of alcohol abuse, and has continued despite being advised to stop several times by MDs. Pt does not currently have a caregiver plan locally, although family says they would make is work. Pt will also be staying with a friend when d/c. Pt would be very high risk for liver transplant at this time.  "

## 2022-10-26 NOTE — PLAN OF CARE
10/26/22 1140   Post-Acute Status   Post-Acute Authorization Placement   Post-Acute Placement Status Patient List Provided       SW met with Pt and Pt family at bedside. Discussed therapy recs for rehab and provided list. Addressed questions and reported need to send referrals to obtain accepting options. The patient agreeable and will review the list for preferences asap.     The SW will continue to follow.       Morgan Travis LMSW  Case Management [facility]  Ext: [phone]

## 2022-10-26 NOTE — ASSESSMENT & PLAN NOTE
MELD-Na score: 34 at 10/26/2022 10:18 AM  MELD score: 33 at 10/26/2022 10:18 AM  Calculated from:  Serum Creatinine: 3.3 mg/dL at 10/26/2022 10:18 AM  Serum Sodium: 132 mmol/L at 10/26/2022 10:18 AM  Total Bilirubin: 11.0 mg/dL at 10/26/2022 10:18 AM  INR(ratio): 1.7 at 10/26/2022 10:18 AM  Age: 47 years     - per primary

## 2022-10-26 NOTE — PROGRESS NOTES
"[facility] - [facility]plant [facility]down  Nephrology  Progress Note    Patient Name: Marshall Cornoa  MRN: [id number]  Admission Date: 10/22/2022  Hospital Length of Stay: 4 days  Attending Provider: Sandra Neal MD   Primary Care Physician: Primary Doctor No  Principal Problem:KATHE (acute kidney injury)    Subjective:     HPI: Marshall Corona is a 47-year-old male with hypertension, diabetes, alcoholic cirrhosis who presented to OSH with weakness and abdominal distention. Patient was subsequently transferred to [facility] [place] evaluation.  During hospitalization at OSH patient noted to have right axillary abscess which was drained and he was subsequently started on clindamycin and transitioned to linezolid.  Additionally patient with worsening renal function during admission with creatinine of 1.4 on admit with subsequent worsening up to 3.8 at the time of transfer.  Outside records noting worsening renal function following paracentesis and associated hypotension for which he received albumin and was started on midodrine.  Nephrology was consulted for the management of KATHE.      Interval History: NAEON. Patient feels well today, no change in abdominal pain over yesterday. Making small amounts of urine. Tolerating PO.     Review of patient's allergies indicates:   Allergen Reactions    Codeine Itching, Anaphylaxis and Hives    Hydrocodone-acetaminophen      "Agitation"    Influenza virus vaccines Other (See Comments)    Oxycodone-acetaminophen      "Agitation"     Current Facility-Administered Medications   Medication Frequency    acetaminophen tablet 650 mg Q8H PRN    dextrose 10% bolus 125 mL PRN    dextrose 10% bolus 250 mL PRN    folic acid tablet 1,000 mcg Daily    Lactobacillus rhamnosus GG capsule 1 capsule Daily    lactulose 20 gram/30 mL solution Soln 10 g BID    miconazole NITRATE 2 % top powder BID    miconazole nitrate 2% ointment PRN    midodrine tablet 10 mg Q8H    octreotide injection 100 " mcg Q8H    ondansetron injection 4 mg Q6H PRN    pregabalin capsule 75 mg BID    prochlorperazine injection Soln 5 mg Q6H PRN    sodium bicarbonate tablet 1,300 mg TID    thiamine tablet 100 mg Daily       Objective:     Vital Signs (Most Recent):  Temp: 98.2 °F (36.8 °C) (10/26/22 1146)  Pulse: 109 (10/26/22 1146)  Resp: 18 (10/26/22 1146)  BP: 122/77 (10/26/22 1146)  SpO2: 96 % (10/26/22 1146)  O2 Device (Oxygen Therapy): room air (10/26/22 0800) Vital Signs (24h Range):  Temp:  [97.5 °F (36.4 °C)-98.2 °F (36.8 °C)] 98.2 °F (36.8 °C)  Pulse:  [] 109  Resp:  [10-18] 18  SpO2:  [95 %-99 %] 96 %  BP: ()/(50-81) 122/77     Weight: 93.3 kg (205 lb 11 oz) (10/26/22 0536)  Body mass index is 27.9 kg/m².  Body surface area is 2.18 meters squared.    I/O last 3 completed shifts:  In: 1499.2 [P.O.:1205; I.V.:294.2]  Out: 300 [Urine:300]    Physical Exam  Constitutional:       Appearance: Normal appearance.   Eyes:      General: Scleral icterus present.      Conjunctiva/sclera: Conjunctivae normal.   Cardiovascular:      Rate and Rhythm: Normal rate and regular rhythm.      Pulses: Normal pulses.      Heart sounds: Normal heart sounds.   Pulmonary:      Effort: Pulmonary effort is normal. No respiratory distress.      Breath sounds: Normal breath sounds.   Abdominal:      General: Bowel sounds are normal. There is distension.      Palpations: Abdomen is soft.      Tenderness: There is no abdominal tenderness.   Musculoskeletal:      Right lower leg: No edema.      Left lower leg: No edema.   Skin:     General: Skin is warm and dry.      Coloration: Skin is jaundiced.      Findings: No bruising.   Neurological:      Mental Status: He is alert and oriented to person, place, and time.   No significant change in exam today over yesterday.     Significant Labs:  CBC:   Recent Labs   Lab 10/26/22  1018   WBC 21.44*   RBC 2.51*   HGB 8.6*   HCT 26.3*   *   *   MCH 34.3*   MCHC 32.7     CMP:   Recent  Labs   Lab 10/26/22  1018   *   CALCIUM 8.7   ALBUMIN 3.4*   PROT 5.6*   *   K 4.0   CO2 19*   CL 99   BUN 53*   CREATININE 3.3*   ALKPHOS 144*   ALT 22   AST 51*   BILITOT 11.0*     All labs within the past 24 hours have been reviewed.     Significant Imaging:       Assessment/Plan:     * KATHE (acute kidney injury)  Patient with baseline creatinine of 1.0 which worsened to creatinine of 1.4 at the time of admission to OSH and subsequently 3.8 upon transfer to [facility].  Notably, creatinine 2.4 upon repeat so suspect 3.8 was erroneous. In the setting of cirrhosis, there was some concern for hepatorenal syndrome so patient was started on albumin and midodrine.  Urine sodium (<10) consistent with hepatorenal syndrome but patient with robust blood pressures so less likely.  Lower suspicion for abdominal compartment syndrome given recent paracentesis.  Bilirubin elevated.  Suspect either HRS versus ATN v bilirubin tubulopathy.     Cr continues to increase, 3.3 on 10/25. MAPs maintaining in 70s-90s. S/p paracentesis with 6.3L removed on 10/24.     Recommendations:   - okay to continue HRS regimen at this time  - s/p albumin protocol for volume expansion  - strict intake and output  - maintain map > 65  - renally dose medications and avoid nephrotoxins when possible      Alcoholic cirrhosis  MELD-Na score: 34 at 10/26/2022 10:18 AM  MELD score: 33 at 10/26/2022 10:18 AM  Calculated from:  Serum Creatinine: 3.3 mg/dL at 10/26/2022 10:18 AM  Serum Sodium: 132 mmol/L at 10/26/2022 10:18 AM  Total Bilirubin: 11.0 mg/dL at 10/26/2022 10:18 AM  INR(ratio): 1.7 at 10/26/2022 10:18 AM  Age: 47 years     - per primary           Thank you for your consult. I will follow-up with patient. Please contact us if you have any additional questions.    Duy Hdz MD  Nephrology  [facility] Transplant Step[facility]

## 2022-10-26 NOTE — SUBJECTIVE & OBJECTIVE
"Interval History: Patient is alert, oriented, and cooperative with visit today. No complaints of pain. Reports intermittent nausea but none presently. Mother and sister at bedside.    Past Medical History:   Diagnosis Date    Alcohol abuse 10/23/2022    Allergy     Guillain-[place] syndrome     Hypertension     Peripheral venous insufficiency 6/21/2021    Pulmonary hypertension 8/3/2021    Thrombosis of left saphenous vein 11/26/2019    Uncomplicated alcohol dependence 10/28/2019    Vitamin B12 deficiency (non anemic) 4/30/2020       Past Surgical History:   Procedure Laterality Date    ARTHROSCOPY OF KNEE  2001    Right knee scope    EYE SURGERY      bilateral lasic    INCISION AND DRAINAGE OF ABSCESS Right 10/18/2022    Procedure: INCISION AND DRAINAGE, ABSCESS-AXILLA;  Surgeon: Geovanni Lee MD;  Location: [facility] OR;  Service: General;  Laterality: Right;       Review of patient's allergies indicates:   Allergen Reactions    Codeine Itching, Anaphylaxis and Hives    Hydrocodone-acetaminophen      "Agitation"    Influenza virus vaccines Other (See Comments)    Oxycodone-acetaminophen      "Agitation"       Medications:  Continuous Infusions:  Scheduled Meds:   folic acid  1,000 mcg Oral Daily    Lactobacillus rhamnosus GG  1 capsule Oral Daily    lactulose  10 g Oral BID    miconazole NITRATE 2 %   Topical (Top) BID    midodrine  10 mg Oral Q8H    octreotide  100 mcg Subcutaneous Q8H    pregabalin  75 mg Oral BID    sodium bicarbonate  1,300 mg Oral TID    thiamine  100 mg Oral Daily     PRN Meds:acetaminophen, dextrose 10%, dextrose 10%, miconazole nitrate 2%, ondansetron, prochlorperazine    Family History    No relevant family history       Tobacco Use    Smoking status: Never    Smokeless tobacco: Current     Types: Chew   Substance and Sexual Activity    Alcohol use: Not Currently     Comment: "quit 8wks ago" as of 10/15/2022    Drug use: Never    Sexual activity: Not on file       Review of Systems "   Constitutional:  Positive for activity change and fatigue.   HENT:  Negative for sore throat and trouble swallowing.    Respiratory:  Negative for cough and shortness of breath.    Cardiovascular:  Negative for chest pain and leg swelling.   Gastrointestinal:  Positive for abdominal distention and nausea (intermittent).   Genitourinary:  Negative for dysuria and flank pain.   Musculoskeletal:  Negative for back pain and neck pain.   Skin:  Positive for color change. Negative for rash.   Neurological:  Positive for weakness. Negative for dizziness.   Psychiatric/Behavioral:  Negative for agitation and confusion.    Objective:     Vital Signs (Most Recent):  Temp: 98 °F (36.7 °C) (10/26/22 0800)  Pulse: (!) 111 (10/26/22 0800)  Resp: 10 (10/26/22 0800)  BP: 111/73 (10/26/22 0800)  SpO2: 96 % (10/26/22 0800)   Vital Signs (24h Range):  Temp:  [97.5 °F (36.4 °C)-98 °F (36.7 °C)] 98 °F (36.7 °C)  Pulse:  [] 111  Resp:  [10-15] 10  SpO2:  [95 %-100 %] 96 %  BP: ()/(50-81) 111/73     Weight: 93.3 kg (205 lb 11 oz)  Body mass index is 27.9 kg/m².    Physical Exam  Vitals and nursing note reviewed.   Constitutional:       General: He is not in acute distress.     Appearance: He is well-developed. He is obese.   HENT:      Head: Normocephalic and atraumatic.   Eyes:      General: Scleral icterus present.      Extraocular Movements: Extraocular movements intact.   Cardiovascular:      Rate and Rhythm: Regular rhythm. Tachycardia present.   Pulmonary:      Effort: Pulmonary effort is normal. No respiratory distress.   Abdominal:      General: There is distension.      Palpations: Abdomen is soft.   Musculoskeletal:         General: Normal range of motion.      Right lower leg: No edema.      Left lower leg: No edema.   Skin:     General: Skin is warm and dry.      Coloration: Skin is jaundiced.   Neurological:      Mental Status: He is alert and oriented to person, place, and time.   Psychiatric:         Mood and  Affect: Affect is flat.         Behavior: Behavior is cooperative.       Review of Symptoms      Symptom Assessment (ESAS 0-10 Scale)  Pain:  0  Dyspnea:  0  Anxiety:  0  Nausea:  0  Depression:  0  Anorexia:  0  Fatigue:  2  Insomnia:  0  Restlessness:  0  Agitation:  0     CAM / Delirium:  Negative  Constipation:  Negative  Diarrhea:  Negative      Bowel Management Plan (BMP):  Yes      Pain Assessment:  OME in 24 hours:  0  Location(s): none      Performance Status:  50    Living Arrangements:  Lives alone    Psychosocial/Cultural: Patient is  and lives alone in [place]. He has two sons, ages 21 and 27, and two grandchildren. His mother is living and his father is . He has one sister. He used to own an appliance repair company. His goals are to recover from his illness and to get stronger.    Spiritual:  F - Joaquina and Belief:  Not spiritual or Evangelical  A - Address in Care:  Declines  visits      Advance Care Planning   Advance Directives:   Living Will: No    [place]: No    Do Not Resuscitate Status: No    Medical Power of : Yes    Agent's Name:  Lisa Jamil   Agent's Contact Number:  [phone]    Decision Making:  Patient answered questions and Family answered questions  Goals of Care: The patient endorses that what is most important right now is to focus on curative/life-prolongation (regardless of treatment burdens).    Accordingly, we have decided that the best plan to meet the patient's goals includes continuing with treatment.       Significant Labs: All pertinent labs within the past 24 hours have been reviewed.  CBC:   Recent Labs   Lab 10/25/22  0724   WBC 15.50*   HGB 8.7*   HCT 26.3*   *   *     BMP:  No results for input(s): GLU, NA, K, CL, CO2, BUN, CREATININE, CALCIUM, MG in the last 24 hours.  LFT:  Lab Results   Component Value Date    AST 57 (H) 10/25/2022    ALKPHOS 139 (H) 10/25/2022    BILITOT 10.9 (H) 10/25/2022     Albumin:   Albumin    Date Value Ref Range Status   10/25/2022 3.3 (L) 3.5 - 5.2 g/dL Final   07/22/2015 4.3 3.5 - 5.0 G/DL Final     Protein:   Total Protein   Date Value Ref Range Status   10/25/2022 5.5 (L) 6.0 - 8.4 g/dL Final     Lactic acid:   No results found for: LACTATE    Significant Imaging: I have reviewed all pertinent imaging results/findings within the past 24 hours.

## 2022-10-26 NOTE — PROGRESS NOTES
Progress Note  Hospital Medicine      Patient Name: Marshall Corona  MRN: [id number]  Patient Class: IP- Inpatient     Admission Date: 10/15/2022  Length of Stay: 6 days  Attending Physician: Cecil Villalta MD  Primary Care Provider: Primary Doctor No    SUBJECTIVE:     Follow-up For:  KATHE (acute kidney injury)     Interval history/ROS:   10/26: discussed SNF versus going with family     10/25 appears not currently planning on opening transplant eval.     1024: to paracentesis today. Family in from [place]. We discussed concerns related to transplant candidacy - short period of sobriety, minimal social support and progressive debility.     HPI:  Briefly, is a 47-year-old male with history of hypertension, diabetes, presumed alcoholic cirrhosis who initially presents to the emergency department today with complaint of generalized weakness.  Patient reports he was recently admitted to outside facility with anemia and weakness.  He reports he was transfuse packed red blood cells at that time.  He also underwent paracentesis and EGD which was reportedly unrevealing although no records are available for review at this time.  He was discharged approximately 3 days ago and has reported continued weakness and near syncopal episode today.  He denies any current chest pain, shortness of breath, fevers, chills, nausea, vomiting, diarrhea, hematochezia or melanotic stools.  Patient was noted to have a hemoglobin of 7.2 in the emergency department.  The severity is severe nature subacute onset with no alleviating symptoms appreciated.        Overview/Hospital Course:  47-year-old male with history of hypertension, diabetes, presumed alcoholic cirrhosis initially presents to the emergency department with generalized weakness.  He was recently admitted to outside facility with anemia and weakness.  He was transfuse packed red blood cells at that facility and underwent paracentesis an EGD which was reportedly unrevealing.   Hemoglobin emergency department was noted to be 7.2.  Patient was admitted to the inpatient unit and transfuse 2 units packed red blood cells with consultation to Gastroenterology.  CT abdomen and right upper quadrant ultrasound consistent with cirrhosis.  GI has recommended outpatient follow-up.  Patient was noted to have leukocytosis during his hospitalization with right axillary abscess.  He is placed on empiric broad-spectrum IV antibiotic coverage and underwent I&D by General surgery.  Renal function has worsened and thus nephrology consulted on 10/20.       OBJECTIVE:     Body mass index is 27.9 kg/m².    Vital Signs Range (Last 24H):  Temp:  [97.5 °F (36.4 °C)-98.2 °F (36.8 °C)]   Pulse:  []   Resp:  [10-18]   BP: ()/(50-81)   SpO2:  [95 %-99 %]     I & O (Last 24H):    Intake/Output Summary (Last 24 hours) at 10/26/2022 1339  Last data filed at 10/26/2022 0536  Gross per 24 hour   Intake 1300.3 ml   Output 200 ml   Net 1100.3 ml          Physical Exam:  Vitals and nursing note reviewed.   GEN: awake, alert, comfortable, NAD  EYES: PERRL. Icteric sclera.   ENT: OP clear, MMM.   CV: RRR, no murmur appreciated   PULM: CTAb, no wheezes, on RA  ABD: Normoactive bowel sounds. Abdomen is distended, mild TTP worse on left upper and lower  EXT: Symmetric, atraumatic, no LE edema.   SKIN: normal turgor, no rashes; right axillary abscess bandaged, dry  PSYCH: flat affect    Recent Labs   Lab 10/24/22  0836 10/25/22  0724 10/26/22  1018   * 130* 132*   K 4.3 4.1 4.0   CL 99 101 99   CO2 20* 17* 19*   BUN 52* 52* 53*   CREATININE 2.6* 2.8* 3.3*   * 151* 158*   CALCIUM 9.0 8.6* 8.7   MG 1.8 1.8  --    PHOS 5.1* 4.9*  --        Recent Labs   Lab 10/24/22  0836 10/25/22  0724 10/26/22  1018   ALKPHOS 147* 139* 144*   ALT 26 23 22   AST 74* 57* 51*   ALBUMIN 3.5 3.3* 3.4*   PROT 5.9* 5.5* 5.6*   BILITOT 13.2* 10.9* 11.0*   INR 1.6* 1.7* 1.7*         Recent Labs   Lab 10/24/22  0836 10/24/22  1034  10/25/22  0724 10/26/22  1018   WBC 17.42*  --  15.50* 21.44*   HGB 8.2*  --  8.7* 8.6*   HCT 25.1*  --  26.3* 26.3*   *  --  104* 118*   GRAN 73.8*  12.8*  --  76.3*  11.8* 79.7*  17.1*   SEGS  --  18  --   --    LYMPH 11.8*  2.1  --  9.3*  1.4 7.1*  1.5   LYMPHS  --  25  --   --    MONO 9.1  1.6*  --  9.2  1.4* 8.9  1.9*         No results for input(s): POCTGLUCOSE in the last 168 hours.      No results for input(s): TROPONINI in the last 168 hours.    Diagnostic Results:  Labs: Reviewed    folic acid, 1,000 mcg, Oral, Daily  Lactobacillus rhamnosus GG, 1 capsule, Oral, Daily  lactulose, 10 g, Oral, BID  miconazole NITRATE 2 %, , Topical (Top), BID  midodrine, 10 mg, Oral, Q8H  octreotide, 100 mcg, Subcutaneous, Q8H  pregabalin, 75 mg, Oral, BID  sodium bicarbonate, 1,300 mg, Oral, TID  thiamine, 100 mg, Oral, Daily      As Needed acetaminophen, dextrose 10%, dextrose 10%, miconazole nitrate 2%, ondansetron, prochlorperazine    ASSESSMENT/PLAN:     Assessment: Marshall Corona is a 47 y.o. male here with:     Active Hospital Problems    Diagnosis  POA    *KATHE (acute kidney injury) [N17.9]  Yes    Alcohol dependence in remission [F10.21]  Yes     Chronic    Decompensated hepatic cirrhosis [K72.90, K74.60]  Yes    Abscess of right arm [L02.413]  Yes    Hyponatremia [E87.1]  Yes    Debility [R53.81]  Yes    Alcoholic cirrhosis [K70.30]  Yes    Benign essential HTN [I10]  Yes    Symptomatic anemia [D64.9]  Yes      Resolved Hospital Problems   No resolved problems to display.        Plan:     KATHE (acute kidney injury)  Appears to be consistent with hepatorenal syndrome  Worsening GFR with metabolic acidosis and hyperkalemia   Patient and sister, Rah, informed  Cont midodrine, albumin, octreotide        Alcoholic cirrhosis  New diagnosis cirrhosis, likely 2/2 heavy EtOH use  HCV negative  DF > 32, may benefit from steroids however concern for active infection with WBC 19K  MELD = 35    MELD-Na score:  34 at 10/26/2022 10:18 AM  MELD score: 33 at 10/26/2022 10:18 AM  Calculated from:  Serum Creatinine: 3.3 mg/dL at 10/26/2022 10:18 AM  Serum Sodium: 132 mmol/L at 10/26/2022 10:18 AM  Total Bilirubin: 11.0 mg/dL at 10/26/2022 10:18 AM  INR(ratio): 1.7 at 10/26/2022 10:18 AM  Age: 47 years      - paracentesis, on admission, negative SBP  Thiamine  Vitamin K x 3   Midodrine  Lactulose/rifaximin  - not a candidate currently to open transplant eval due to alcohol abuse, debility and lack of social support.     Symptomatic anemia  Hemoglobin improved post pRBC transfusion and has remained stable  No overt bleeding  Recent EGD at [facility]-- reportedly normal, will request records  Plan cscope as outpatient once medically optimized per GI     Hyponatremia  asymptomatic.  Now improved.     Abscess of right arm  Patient status post I&D on 10/18.    Cultures NGTD  WBC rising-- abx changed from clinda (compelted 3 days) to linezolid on 10/20  Add Rocephin      Debility  Continue aggressive PT/OT.  Work towards placement.     Thrombocytopenia  Likely secondary to underlying cirrhosis.  Monitor closely and transfuse as indicated.     Benign essential HTN  BP stable off of home antihypertensive regimen.        VTE Risk Mitigation (From admission, onward)           HIGH RISK CONDITION(S):  Patient has a condition that poses threat to life and bodily function: Acute Renal Failure        Sandra Neal MD

## 2022-10-26 NOTE — PT/OT/SLP PROGRESS
Occupational Therapy   Co-Treatment  Co-treatment with PT for maximal pt participation, safety, and activity tolerance     Name: Marshall Corona  MRN: [id number]  Admitting Diagnosis:  KATHE (acute kidney injury)       Recommendations:     Discharge Recommendations: rehabilitation facility  Discharge Equipment Recommendations:  bedside commode  Barriers to discharge:  None    Assessment:     Marshall Corona is a 47 y.o. male with a medical diagnosis of KATHE (acute kidney injury).  He presents with impaired ADL and mobility performance deficits. Pt found upright in bed and agreeable for therapy. Pt session focused on bedroom mobility, hallway navigation in prep for ADL/IADL completion, toileting, and safe return to room for setup in chair. Pt required SBA for bed mobility and CGA for gait with RW. Pt with wide JOS[place] MIGUEL, lateral sway, and seated break required. Pt with x2 episodes of bowel requiring pericare. Pt was left upright in chair voicing appreciation for therapy. Pt would benefit from continued OT skilled services 3x/wk to improve daily living skills to optimize QOL. Pt is recommended to discharge to rehab  at this time.  Performance deficits affecting function are weakness, impaired functional mobility, impaired endurance, gait instability, impaired self care skills, impaired balance.     Rehab Prognosis:  Good; patient would benefit from acute skilled OT services to address these deficits and reach maximum level of function.       Plan:     Patient to be seen 3 x/week to address the above listed problems via self-care/home management, therapeutic activities, therapeutic exercises, neuromuscular re-education  Plan of Care Expires: 11/30/22  Plan of Care Reviewed with: patient    Subjective     Pain/Comfort:       Objective:     Communicated with: RN prior to session.  Patient found HOB elevated with   upon OT entry to room.    General Precautions: Standard, fall   Orthopedic Precautions:N/A   Braces: N/A  Respiratory  Status: Room air     Occupational Performance:     Bed Mobility:    Patient completed Rolling/Turning to Left with  stand by assistance  Patient completed Scooting/Bridging with stand by assistance  Patient completed Supine to Sit with stand by assistance     Functional Mobility/Transfers:  Patient completed Sit <> Stand Transfer with contact guard assistance  with  rolling walker   Patient completed Bed <> Chair Transfer using Step Transfer technique with minimum assistance with rolling walker  Functional Mobility: Pt stood from bed with CGA using RW towards window near room. Pt stood ~2 minutes to enjoy view however required seated break 2/2 fatigue. Pt then mobilized in room with CGA-min A with LOB. Pt with wide JOSÉ MIGUEL and lateral sway     Activities of Daily Living:  Grooming: stand by assistance cleaning hands following toileting   Upper Body Dressing: minimum assistance donning gown   Lower Body Dressing: SBA using figure 4 method to don/doff   Toileting: total assistance for pericare x2      AMPAC 6 Click ADL: 18    Treatment & Education:  Pt educated on role of occupational therapy, POC, and safety during ADLs and functional mobility. Pt and OT discussed importance of safe, continued mobility to optimize daily living skills. Pt verbalized understanding.  White board updated during session. Pt given instruction to call for medical staff/nurse for assistance.       Patient left up in chair with all lines intact, call button in reach, and RN notified    GOALS:   Multidisciplinary Problems       Occupational Therapy Goals          Problem: Occupational Therapy    Goal Priority Disciplines Outcome Interventions   Occupational Therapy Goal     OT, PT/OT Ongoing, Progressing    Description: Goals to be met by: 10/24/22     Patient will increase functional independence with ADLs by performing:    UE Dressing with Modified [place].  LE Dressing with Modified [place].  Grooming while standing at sink with  Modified [place].  Toileting from toilet with Modified [place] for hygiene and clothing management.   Toilet transfer to toilet with Modified [place].                         Time Tracking:     OT Date of Treatment: 10/26/22  OT Start Time: 1400  OT Stop Time: 1430  OT Total Time (min): 30 min    Billable Minutes:Self Care/Home Management 30 min    OT/TOM: OT          10/26/2022

## 2022-10-26 NOTE — PT/OT/SLP PROGRESS
Physical Therapy Treatment/Co-Treatment with O.T.    Patient Name:  Marshall Corona   MRN:  [id number]    Recommendations:     Discharge Recommendations:  rehabilitation facility   Discharge Equipment Recommendations: bedside commode   Barriers to discharge: Inaccessible home    Assessment:     Marshall Corona is a 47 y.o. male admitted with a medical diagnosis of KATHE (acute kidney injury).  He presents with the following impairments/functional limitations:  weakness, impaired endurance, impaired self care skills, impaired functional mobility, gait instability, impaired balance, decreased ROM, edema . Patient showed improved endurance as evidence of walking range, but min gait instability still noted.    Rehab Prognosis: Good; patient would benefit from acute skilled PT services to address these deficits and reach maximum level of function.    Recent Surgery: * No surgery found *      Plan:     During this hospitalization, patient to be seen 4 x/week to address the identified rehab impairments via gait training, therapeutic activities, therapeutic exercises, neuromuscular re-education and progress toward the following goals:    Plan of Care Expires:  11/24/22    Subjective     Chief Complaint: fatigue  Patient/Family Comments/goals: to heal well.  Pain/Comfort:  Pain Rating 1: 0/10  Pain Rating Post-Intervention 1: 0/10      Objective:     Communicated with NSG prior to session.  Patient found HOB elevated with   upon PT entry to room.     General Precautions: Standard, fall   Orthopedic Precautions:N/A   Braces: N/A  Respiratory Status: Room air     Functional Mobility:  Bed Mobility:     Rolling Left:  stand by assistance  Scooting: stand by assistance  Supine to Sit: stand by assistance  Transfers:     Sit to Stand:  minimum assistance with rolling walker  Bed to Chair: contact guard assistance with  rolling walker  using  Stand Pivot  Gait: 54 ft x 2 trials with RW and CGA, with chair follow and a long rest break  in standing.      AM-PAC 6 CLICK MOBILITY  Turning over in bed (including adjusting bedclothes, sheets and blankets)?: 4  Sitting down on and standing up from a chair with arms (e.g., wheelchair, bedside commode, etc.): 3  Moving from lying on back to sitting on the side of the bed?: 4  Moving to and from a bed to a chair (including a wheelchair)?: 3  Need to walk in hospital room?: 3  Climbing 3-5 steps with a railing?: 1  Basic Mobility Total Score: 18       Treatment & Education:  Co-Treatment with O.T. who performed self care activities. Donned/Doffed a gown. Assisted with cleaning due to an accident while ambulating on hallway.    Patient left up in chair with call button in reach and family present..    GOALS:   Multidisciplinary Problems       Physical Therapy Goals          Problem: Physical Therapy    Goal Priority Disciplines Outcome Goal Variances Interventions   Physical Therapy Goal     PT, PT/OT Ongoing, Progressing     Description: Goals to be met by: 2022     Patient will increase functional independence with mobility by performin. Supine to sit with Set-up [place]  2. Sit to stand transfer with supervision  3. Gait  x 150 feet with Supervision using Rolling Walker.   4. Ascend/descend 3 stair with bilateral Handrails Minimal Assistance using No Assistive Device.   5. Stand for 5 minutes with Modified [place] using Rolling Walker or no AD                         Time Tracking:     PT Received On: 10/26/22  PT Start Time: 1400     PT Stop Time: 1426  PT Total Time (min): 26 min     Billable Minutes: Gait Training 15 and Therapeutic Activity 11    Treatment Type: Treatment  PT/PTA: PTA     PTA Visit Number: 1     10/26/2022

## 2022-10-26 NOTE — HPI
Mr. Corona is a 48 y/o male with PMHx of HTN, DM2, Guillain-[place] syndrome, DVT, and alcoholic cirrhosis (last drink in 8/2022) who initially presented to the ED at [facility] on 10/15 with weakness and abdominal distension. Labs notable for WBC 12.9, Hb 7.2, , Na 132, CO2 21, BUN 50, Cr 1.41, T bili 8.8, AST 68, and alk phos 237. CT A/P demonstrated cirrhosis with moderate volume ascites and a nonspecific hypoattenuating lesion near the splenic hilum. US abdomen showed moderate ascites and cirrhotic liver with stasis flow suggestive of portal hypertension. He was admitted to Hospital Medicine and received 2 units PRBCs. He was noted to have a right axillary abscess and was started on IV abx. He underwent I&D with General Surgery on 10/18. Nephrology was consulted on 10/20 due to worsening renal function and concern for possible hepatorenal syndrome. He was started on albumin and midodrine. He is s/p paracentesis on 10/21 with 7.7L removed. He was transferred to [facility] on 10/22 for liver transplant evaluation. He was admitted to Hospital Medicine with consults placed to Hepatology and Nephrology. He had another paracentesis performed on 10/24 with 6.3L removed. Liver transplant evaluation on hold pending evaluation by transplant  and further discussion by liver transplant committee.   06-Sep-2020 01:52

## 2022-10-26 NOTE — PLAN OF CARE
AAOx4- lactulose continued, VSS  Liver eval contiuned; PETH pending, refer to hepatology note 10/25; palliative consulted  Para done 10/24; >6L fluid removed, SBP labs obtained, graim stain negative  PERLA axillae dressing changed 10/24, plan to change today 10/26 per WC orders  Nephrology following, very minimal UOP overnight; did not measure overnight due to BM coinciding with void, Cr 2.8 trending up  PT/OT following, cont to work with them while in patient  Per hepatology, plan to hold off initiation of transplant until full eval completed  Non skid socks worn, call light within reach, will cont to monitor

## 2022-10-26 NOTE — SUBJECTIVE & OBJECTIVE
"Interval History: NAEON. Patient feels well today, no change in abdominal pain over yesterday. Making small amounts of urine. Tolerating PO.     Review of patient's allergies indicates:   Allergen Reactions    Codeine Itching, Anaphylaxis and Hives    Hydrocodone-acetaminophen      "Agitation"    Influenza virus vaccines Other (See Comments)    Oxycodone-acetaminophen      "Agitation"     Current Facility-Administered Medications   Medication Frequency    acetaminophen tablet 650 mg Q8H PRN    dextrose 10% bolus 125 mL PRN    dextrose 10% bolus 250 mL PRN    folic acid tablet 1,000 mcg Daily    Lactobacillus rhamnosus GG capsule 1 capsule Daily    lactulose 20 gram/30 mL solution Soln 10 g BID    miconazole NITRATE 2 % top powder BID    miconazole nitrate 2% ointment PRN    midodrine tablet 10 mg Q8H    octreotide injection 100 mcg Q8H    ondansetron injection 4 mg Q6H PRN    pregabalin capsule 75 mg BID    prochlorperazine injection Soln 5 mg Q6H PRN    sodium bicarbonate tablet 1,300 mg TID    thiamine tablet 100 mg Daily       Objective:     Vital Signs (Most Recent):  Temp: 98.2 °F (36.8 °C) (10/26/22 1146)  Pulse: 109 (10/26/22 1146)  Resp: 18 (10/26/22 1146)  BP: 122/77 (10/26/22 1146)  SpO2: 96 % (10/26/22 1146)  O2 Device (Oxygen Therapy): room air (10/26/22 0800) Vital Signs (24h Range):  Temp:  [97.5 °F (36.4 °C)-98.2 °F (36.8 °C)] 98.2 °F (36.8 °C)  Pulse:  [] 109  Resp:  [10-18] 18  SpO2:  [95 %-99 %] 96 %  BP: ()/(50-81) 122/77     Weight: 93.3 kg (205 lb 11 oz) (10/26/22 0536)  Body mass index is 27.9 kg/m².  Body surface area is 2.18 meters squared.    I/O last 3 completed shifts:  In: 1499.2 [P.O.:1205; I.V.:294.2]  Out: 300 [Urine:300]    Physical Exam  Constitutional:       Appearance: Normal appearance.   Eyes:      General: Scleral icterus present.      Conjunctiva/sclera: Conjunctivae normal.   Cardiovascular:      Rate and Rhythm: Normal rate and regular rhythm.      Pulses: Normal " pulses.      Heart sounds: Normal heart sounds.   Pulmonary:      Effort: Pulmonary effort is normal. No respiratory distress.      Breath sounds: Normal breath sounds.   Abdominal:      General: Bowel sounds are normal. There is distension.      Palpations: Abdomen is soft.      Tenderness: There is no abdominal tenderness.   Musculoskeletal:      Right lower leg: No edema.      Left lower leg: No edema.   Skin:     General: Skin is warm and dry.      Coloration: Skin is jaundiced.      Findings: No bruising.   Neurological:      Mental Status: He is alert and oriented to person, place, and time.   No significant change in exam today over yesterday.     Significant Labs:  CBC:   Recent Labs   Lab 10/26/22  1018   WBC 21.44*   RBC 2.51*   HGB 8.6*   HCT 26.3*   *   *   MCH 34.3*   MCHC 32.7     CMP:   Recent Labs   Lab 10/26/22  1018   *   CALCIUM 8.7   ALBUMIN 3.4*   PROT 5.6*   *   K 4.0   CO2 19*   CL 99   BUN 53*   CREATININE 3.3*   ALKPHOS 144*   ALT 22   AST 51*   BILITOT 11.0*     All labs within the past 24 hours have been reviewed.     Significant Imaging:

## 2022-10-27 PROBLEM — I10 BENIGN ESSENTIAL HTN: Status: RESOLVED | Noted: 2022-10-15 | Resolved: 2022-10-27

## 2022-10-27 LAB
ALBUMIN SERPL BCP-MCNC: 3.1 G/DL (ref 3.5–5.2)
ALP SERPL-CCNC: 149 U/L (ref 55–135)
ALT SERPL W/O P-5'-P-CCNC: 22 U/L (ref 10–44)
ANION GAP SERPL CALC-SCNC: 10 MMOL/L (ref 8–16)
ANISOCYTOSIS BLD QL SMEAR: SLIGHT
AST SERPL-CCNC: 42 U/L (ref 10–40)
BACTERIA SPEC AEROBE CULT: NO GROWTH
BASOPHILS # BLD AUTO: 0.11 K/UL (ref 0–0.2)
BASOPHILS NFR BLD: 0.5 % (ref 0–1.9)
BILIRUB SERPL-MCNC: 11.9 MG/DL (ref 0.1–1)
BUN SERPL-MCNC: 58 MG/DL (ref 6–20)
BURR CELLS BLD QL SMEAR: ABNORMAL
CALCIUM SERPL-MCNC: 8.4 MG/DL (ref 8.7–10.5)
CHLORIDE SERPL-SCNC: 99 MMOL/L (ref 95–110)
CLINICAL BIOCHEMIST REVIEW: NORMAL
CO2 SERPL-SCNC: 20 MMOL/L (ref 23–29)
CREAT SERPL-MCNC: 3.8 MG/DL (ref 0.5–1.4)
DIFFERENTIAL METHOD: ABNORMAL
EOSINOPHIL # BLD AUTO: 0.9 K/UL (ref 0–0.5)
EOSINOPHIL NFR BLD: 4.1 % (ref 0–8)
ERYTHROCYTE [DISTWIDTH] IN BLOOD BY AUTOMATED COUNT: 18.7 % (ref 11.5–14.5)
EST. GFR  (NO RACE VARIABLE): 18.6 ML/MIN/1.73 M^2
FINAL PATHOLOGIC DIAGNOSIS: NORMAL
GLUCOSE SERPL-MCNC: 157 MG/DL (ref 70–110)
HCT VFR BLD AUTO: 25.3 % (ref 40–54)
HGB BLD-MCNC: 8.4 G/DL (ref 14–18)
HYPOCHROMIA BLD QL SMEAR: ABNORMAL
IMM GRANULOCYTES # BLD AUTO: 0.11 K/UL (ref 0–0.04)
IMM GRANULOCYTES NFR BLD AUTO: 0.5 % (ref 0–0.5)
INR PPP: 1.8 (ref 0.8–1.2)
LYMPHOCYTES # BLD AUTO: 2.8 K/UL (ref 1–4.8)
LYMPHOCYTES NFR BLD: 12.3 % (ref 18–48)
Lab: NORMAL
MCH RBC QN AUTO: 34.9 PG (ref 27–31)
MCHC RBC AUTO-ENTMCNC: 33.2 G/DL (ref 32–36)
MCV RBC AUTO: 105 FL (ref 82–98)
MICROSCOPIC EXAM: NORMAL
MONOCYTES # BLD AUTO: 2.5 K/UL (ref 0.3–1)
MONOCYTES NFR BLD: 11.2 % (ref 4–15)
NEUTROPHILS # BLD AUTO: 16.1 K/UL (ref 1.8–7.7)
NEUTROPHILS NFR BLD: 71.4 % (ref 38–73)
NRBC BLD-RTO: 0 /100 WBC
OVALOCYTES BLD QL SMEAR: ABNORMAL
PETH 16:0/18.1 (POPETH): <10 NG/ML
PETH 16:0/18.2 (PLPETH): <10 NG/ML
PLATELET # BLD AUTO: 108 K/UL (ref 150–450)
PLATELET BLD QL SMEAR: ABNORMAL
PMV BLD AUTO: 12.9 FL (ref 9.2–12.9)
POIKILOCYTOSIS BLD QL SMEAR: SLIGHT
POLYCHROMASIA BLD QL SMEAR: ABNORMAL
POTASSIUM SERPL-SCNC: 4.1 MMOL/L (ref 3.5–5.1)
PROT SERPL-MCNC: 5.5 G/DL (ref 6–8.4)
PROTHROMBIN TIME: 18.4 SEC (ref 9–12.5)
RBC # BLD AUTO: 2.41 M/UL (ref 4.6–6.2)
SODIUM SERPL-SCNC: 129 MMOL/L (ref 136–145)
TARGETS BLD QL SMEAR: ABNORMAL
WBC # BLD AUTO: 22.53 K/UL (ref 3.9–12.7)

## 2022-10-27 PROCEDURE — 85025 COMPLETE CBC W/AUTO DIFF WBC: CPT | Performed by: HOSPITALIST

## 2022-10-27 PROCEDURE — 80053 COMPREHEN METABOLIC PANEL: CPT | Performed by: HOSPITALIST

## 2022-10-27 PROCEDURE — 63600175 PHARM REV CODE 636 W HCPCS: Mod: JB | Performed by: HOSPITALIST

## 2022-10-27 PROCEDURE — 25000003 PHARM REV CODE 250: Performed by: HOSPITALIST

## 2022-10-27 PROCEDURE — 99233 SBSQ HOSP IP/OBS HIGH 50: CPT | Mod: ,,, | Performed by: STUDENT IN AN ORGANIZED HEALTH CARE EDUCATION/TRAINING PROGRAM

## 2022-10-27 PROCEDURE — 99233 PR SUBSEQUENT HOSPITAL CARE,LEVL III: ICD-10-PCS | Mod: ,,, | Performed by: STUDENT IN AN ORGANIZED HEALTH CARE EDUCATION/TRAINING PROGRAM

## 2022-10-27 PROCEDURE — 36415 COLL VENOUS BLD VENIPUNCTURE: CPT | Performed by: HOSPITALIST

## 2022-10-27 PROCEDURE — 97116 GAIT TRAINING THERAPY: CPT | Mod: CQ

## 2022-10-27 PROCEDURE — 85610 PROTHROMBIN TIME: CPT | Performed by: HOSPITALIST

## 2022-10-27 PROCEDURE — 20600001 HC STEP DOWN PRIVATE ROOM

## 2022-10-27 RX ADMIN — LACTULOSE 10 G: 20 SOLUTION ORAL at 08:10

## 2022-10-27 RX ADMIN — PREGABALIN 75 MG: 75 CAPSULE ORAL at 08:10

## 2022-10-27 RX ADMIN — OCTREOTIDE ACETATE 100 MCG: 100 INJECTION, SOLUTION INTRAVENOUS; SUBCUTANEOUS at 06:10

## 2022-10-27 RX ADMIN — OCTREOTIDE ACETATE 100 MCG: 100 INJECTION, SOLUTION INTRAVENOUS; SUBCUTANEOUS at 08:10

## 2022-10-27 RX ADMIN — MIDODRINE HYDROCHLORIDE 10 MG: 5 TABLET ORAL at 02:10

## 2022-10-27 RX ADMIN — MIDODRINE HYDROCHLORIDE 10 MG: 5 TABLET ORAL at 08:10

## 2022-10-27 RX ADMIN — SODIUM BICARBONATE 650 MG TABLET 1300 MG: at 08:10

## 2022-10-27 RX ADMIN — MIDODRINE HYDROCHLORIDE 10 MG: 5 TABLET ORAL at 06:10

## 2022-10-27 RX ADMIN — FOLIC ACID 1000 MCG: 1 TABLET ORAL at 08:10

## 2022-10-27 RX ADMIN — OCTREOTIDE ACETATE 100 MCG: 100 INJECTION, SOLUTION INTRAVENOUS; SUBCUTANEOUS at 02:10

## 2022-10-27 RX ADMIN — Medication 100 MG: at 08:10

## 2022-10-27 RX ADMIN — SODIUM BICARBONATE 650 MG TABLET 1300 MG: at 02:10

## 2022-10-27 RX ADMIN — Medication 1 CAPSULE: at 08:10

## 2022-10-27 NOTE — ASSESSMENT & PLAN NOTE
Impression:  Palliative Medicine consulted for goals of care discussion/end of life planning for this 48 y/o male being followed by Hospital Medicine, Hepatology, and Nephrology for Alcoholic cirrhosis, Acute kidney injury, Anemia, Thrombocytopenia, Hyponatremia, Hypertension, Right arm abscess, and Debility. He initially presented to OSH on 10/15 with weakness and abdominal distension. Imaging significant for cirrhosis and moderate volume ascites. Labs notable for anemia, leukocytosis, hyponatremia, and KATHE. He was admitted to Hospital Medicine and received 2 units PRBCs. He was found to have a right axillary abscess and was started on IV abx. He underwent I&D with General Surgery on 10/18. Nephrology was consulted on 10/20 due to worsening renal function and concern for possible hepatorenal syndrome. He is s/p paracentesis on 10/21 with 7.7L removed. He was transferred to [facility] on 10/22 for liver transplant evaluation. He had another paracentesis performed on 10/24 with 6.3L removed. Liver transplant evaluation on hold pending evaluation by transplant  and further discussion by liver transplant committee. Patient is alert, oriented, and cooperative with visit today. Mother and sister at bedside.    Plan/Recommendations:  1. See goals of care discussion below.  2. MPOA paperwork completed today.  3. Patient would like to continue aggressive treatment in hopes of clinical improvement and eventual transplant.  4. Family requesting transfer to [facility] for transplant evaluation. Patient does not have a support system in [place] but has family in [place] that can provide around the clock care.  5. Palliative Medicine will continue to follow as we determine whether transplant is a realistic possibility.    Alcoholic cirrhosis/Acute kidney injury/Anemia/Thrombocytopenia/Hyponatremia/Hypertension/Right arm abscess/Debility - management per primary team and other consultants    Symptom  Management:  Nausea - Intermittent. Zofran 4 mg IV Q6H PRN ordered per primary team.    Goals of Care/Advance Care Planning:  Conference conducted by this NP with patient, mother Brenda and sister Lisa to discuss his current clinical status, goals of care, code status, long term expected outcomes and prognostic awareness. After a discussion concerning his values and wishes, patient demonstrated fair understanding and insight as it relates to his prognostic awareness. His goals are to recover from his illness and to get stronger. He says that he is waiting to hear back about the possibility of a transplant. I shared in his hope for clinical improvement but also encouraged him to start thinking about his wishes in the event things dont work out the way we hope they will.    Advance Care Planning     Living Will  During this visit, I engaged the patient in the advance care planning process. We reviewed the role for advance directives and their purpose in directing future healthcare if the patient was unable to speak for himself. At this point in time, the patient does have full decision-making capacity. We discussed different extreme health states that he could experience, and reviewed what kind of medical care he would want in those situations. The patient wants to remain full code but says that if he were comatose and had little chance of a meaningful recovery, he would not want machines/life-sustaining treatments used. He declines to complete a living will at this time but his family is aware of his wishes.      Power of   During our discussion regarding advance directives, the patient received detailed information about the importance of designating a Health Care Power of  (HCPOA). He was instructed to communicate with this person about his wishes for future healthcare, should he become sick and lose decision-making capacity. The patient has not previously appointed a HCPOA. After our  discussion, the patient has decided to complete a HCPOA and has appointed his sister, health care agent: Lisa Jamil & health care agent number: [phone]. HCPOA paperwork scanned into [employer].        Returned to room with original copy of HCPOA paperwork for the patient as Dr. Mendez was going in to see him. She spoke to the patient and his family regarding concerns about his lack of local caregiver support and the fact that he was drinking until recently. Patient says he was told in the past that he had a fatty liver and that he should not drink but he did not realize how serious his condition was. He says that he is not going to drink again. His mother says they will do whatever it takes to get him a transplant. She acknowledges that he does not have a support system in [place] and says that they will figure something out if they need to, but asked about having him transferred for transplant evaluation in [place] where all his family lives. His sister says that he could move in with her and she could be his full time caregiver. Family is specifically requesting [facility]. Explained that there is no guarantee that he will be accepted but that we can at least try and see what happens. Dr. Mendez mentioned that insurance coverage may be an issue and that transport could be expensive, although she said that he could potentially fly commercial if he was stable enough.    I let the family know that I will continue to follow up with them as we see what his options are for treatment. Per Dr. Mendez, he is unlikely to be approved for transplant evaluation at [facility] at this point. I explained that he could potentially be deferred for transplant evaluation if transplant committee is willing to reconsider him in the future; he would likely need physical rehab, alcohol rehab, and clear plans for local caregiver support. Patient asked what would happen if he got worse while waiting on a liver and I agreed  that could happen. I explained that we will be honest with him and that if he is not expected to recover, we will talk more about shifting from aggressive treatment to comfort focused treatment.    Case discussed with Dr. Neal following this visit.

## 2022-10-27 NOTE — CONSULTS
[facility] - Transplant Stepdown  Palliative Medicine  Consult Note  10/26/2022    Patient Name: Marshall Corona  MRN: [id number]  Admission Date: 10/22/2022  Hospital Length of Stay: 4 days  Code Status: Full Code   Attending Provider: Amando Knight MD  Consulting Provider: Dolores Vazquez NP  Primary Care Physician: Primary Doctor No  Principal Problem:KATHE (acute kidney injury)    Patient information was obtained from patient, parent, relative(s), past medical records and primary team.      Consults  Assessment/Plan:     Encounter for palliative care  Impression:  Palliative Medicine consulted for goals of care discussion/end of life planning for this 46 y/o male being followed by Hospital Medicine, Hepatology, and Nephrology for Alcoholic cirrhosis, Acute kidney injury, Anemia, Thrombocytopenia, Hyponatremia, Hypertension, Right arm abscess, and Debility. He initially presented to OSH on 10/15 with weakness and abdominal distension. Imaging significant for cirrhosis and moderate volume ascites. Labs notable for anemia, leukocytosis, hyponatremia, and KATHE. He was admitted to Hospital Medicine and received 2 units PRBCs. He was found to have a right axillary abscess and was started on IV abx. He underwent I&D with General Surgery on 10/18. Nephrology was consulted on 10/20 due to worsening renal function and concern for possible hepatorenal syndrome. He is s/p paracentesis on 10/21 with 7.7L removed. He was [place] [facility] [place] for liver transplant evaluation. He had another paracentesis performed on 10/24 with 6.3L removed. Liver transplant evaluation on hold pending evaluation by transplant  and further discussion by liver transplant committee. Patient is alert, oriented, and cooperative with visit today. Mother and sister at bedside.    Plan/Recommendations:  1. See goals of care discussion below.  2. MPOA paperwork completed today.  3. Patient would like to continue aggressive treatment in  hopes of clinical improvement and eventual transplant.  4. Family requesting transfer to [facility] for transplant evaluation. Patient does not have a support system in [place] but has family in [place] that can provide around the clock care.  5. Palliative Medicine will continue to follow as we determine whether transplant is a realistic possibility.    Alcoholic cirrhosis/Acute kidney injury/Anemia/Thrombocytopenia/Hyponatremia/Hypertension/Right arm abscess/Debility - management per primary team and other consultants    Symptom Management:  Nausea - Intermittent. Zofran 4 mg IV Q6H PRN ordered per primary team.    Goals of Care/Advance Care Planning:  Conference conducted by this NP with patient, mother Brenda and sister Lisa to discuss his current clinical status, goals of care, code status, long term expected outcomes and prognostic awareness. After a discussion concerning his values and wishes, patient demonstrated fair understanding and insight as it relates to his prognostic awareness. His goals are to recover from his illness and to get stronger. He says that he is waiting to hear back about the possibility of a transplant. I shared in his hope for clinical improvement but also encouraged him to start thinking about his wishes in the event things dont work out the way we hope they will.    Advance Care Planning     Living Will  During this visit, I engaged the patient in the advance care planning process. We reviewed the role for advance directives and their purpose in directing future healthcare if the patient was unable to speak for himself. At this point in time, the patient does have full decision-making capacity. We discussed different extreme health states that he could experience, and reviewed what kind of medical care he would want in those situations. The patient wants to remain full code but says that if he were comatose and had little chance of a meaningful recovery, he  would not want machines/life-sustaining treatments used. He declines to complete a living will at this time but his family is aware of his wishes.      Power of   During our discussion regarding advance directives, the patient received detailed information about the importance of designating a Health Care Power of  (HCPOA). He was instructed to communicate with this person about his wishes for future healthcare, should he become sick and lose decision-making capacity. The patient has not previously appointed a HCPOA. After our discussion, the patient has decided to complete a HCPOA and has appointed his sister, health care agent: Lisa Jamil & health care agent number: [phone]. HCPOA paperwork scanned into [employer].       Returned to room with original copy of HCPOA paperwork for the patient as Dr. Mendez was going in to see him. She spoke to the patient and his family regarding concerns about his lack of local caregiver support and the fact that he was drinking until recently. Patient says he was told in the past that he had a fatty liver and that he should not drink but he did not realize how serious his condition was. He says that he is not going to drink again. His mother says they will do whatever it takes to get him a transplant. She acknowledges that he does not have a support system in [place] and says that they will figure something out if they need to, but asked about having him transferred for transplant evaluation in [place] where all his family lives. His sister says that he could move in with her and she could be his full time caregiver. Family is specifically requesting [facility]. Explained that there is no guarantee that he will be accepted but that we can at least try and see what happens. Dr. Mendez mentioned that insurance coverage may be an issue and that transport could be expensive, although she said that he could potentially fly commercial if he was  stable enough.    I let the family know that I will continue to follow up with them as we see what his options are for treatment. Per Dr. Mendez, he is unlikely to be approved for transplant evaluation at [facility] at this point. I explained that he could potentially be deferred for transplant evaluation if transplant committee is willing to reconsider him in the future; he would likely need physical rehab, alcohol rehab, and clear plans for local caregiver support. Patient asked what would happen if he got worse while waiting on a liver and I agreed that could happen. I explained that we will be honest with him and that if he is not expected to recover, we will talk more about shifting from aggressive treatment to comfort focused treatment.    Case discussed with Dr. Neal following this visit.        Thank you for your consult. I will follow-up with patient. Please contact us if you have any additional questions.    Subjective:     HPI:   Mr. Corona is a 48 y/o male with PMHx of HTN, DM2, Guillain-Dallas syndrome, DVT, and alcoholic cirrhosis (last drink in 8/2022) who initially presented to the ED at [facility] on 10/15 with weakness and abdominal distension. Labs notable for WBC 12.9, Hb 7.2, , Na 132, CO2 21, BUN 50, Cr 1.41, T bili 8.8, AST 68, and alk phos 237. CT A/P demonstrated cirrhosis with moderate volume ascites and a nonspecific hypoattenuating lesion near the splenic hilum. US abdomen showed moderate ascites and cirrhotic liver with stasis flow suggestive of portal hypertension. He was admitted to Hospital Medicine and received 2 units PRBCs. He was noted to have a right axillary abscess and was started on IV abx. He underwent I&D with General Surgery on 10/18. Nephrology was consulted on 10/20 due to worsening renal function and concern for possible hepatorenal syndrome. He was started on albumin and midodrine. He is s/p paracentesis on 10/21 with 7.7L removed. He was transferred  "to [facility] on 10/22 for liver transplant evaluation. He was admitted to Hospital Medicine with consults placed to Hepatology and Nephrology. He had another paracentesis performed on 10/24 with 6.3L removed. Liver transplant evaluation on hold pending evaluation by transplant  and further discussion by liver transplant committee.      Hospital Course:  No notes on file    Interval History: Patient is alert, oriented, and cooperative with visit today. No complaints of pain. Reports intermittent nausea but none presently. Mother and sister at bedside.    Past Medical History:   Diagnosis Date    Alcohol abuse 10/23/2022    Allergy     Guillain-Eggleston syndrome     Hypertension     Peripheral venous insufficiency 6/21/2021    Pulmonary hypertension 8/3/2021    Thrombosis of left saphenous vein 11/26/2019    Uncomplicated alcohol dependence 10/28/2019    Vitamin B12 deficiency (non anemic) 4/30/2020       Past Surgical History:   Procedure Laterality Date    ARTHROSCOPY OF KNEE  2001    Right knee scope    EYE SURGERY      bilateral lasic    INCISION AND DRAINAGE OF ABSCESS Right 10/18/2022    Procedure: INCISION AND DRAINAGE, ABSCESS-AXILLA;  Surgeon: Geovanni Lee MD;  [facility];  Service: General;  Laterality: Right;       Review of patient's allergies indicates:   Allergen Reactions    Codeine Itching, Anaphylaxis and Hives    Hydrocodone-acetaminophen      "Agitation"    Influenza virus vaccines Other (See Comments)    Oxycodone-acetaminophen      "Agitation"       Medications:  Continuous Infusions:  Scheduled Meds:   folic acid  1,000 mcg Oral Daily    Lactobacillus rhamnosus GG  1 capsule Oral Daily    lactulose  10 g Oral BID    miconazole NITRATE 2 %   Topical (Top) BID    midodrine  10 mg Oral Q8H    octreotide  100 mcg Subcutaneous Q8H    pregabalin  75 mg Oral BID    sodium bicarbonate  1,300 mg Oral TID    thiamine  100 mg Oral Daily     PRN Meds:acetaminophen, dextrose 10%, dextrose " "10%, miconazole nitrate 2%, ondansetron, prochlorperazine    Family History    No relevant family history       Tobacco Use    Smoking status: Never    Smokeless tobacco: Current     Types: Chew   Substance and Sexual Activity    Alcohol use: Not Currently     Comment: "quit 8wks ago" as of 10/15/2022    Drug use: Never    Sexual activity: Not on file       Review of Systems   Constitutional:  Positive for activity change and fatigue.   HENT:  Negative for sore throat and trouble swallowing.    Respiratory:  Negative for cough and shortness of breath.    Cardiovascular:  Negative for chest pain and leg swelling.   Gastrointestinal:  Positive for abdominal distention and nausea (intermittent).   Genitourinary:  Negative for dysuria and flank pain.   Musculoskeletal:  Negative for back pain and neck pain.   Skin:  Positive for color change. Negative for rash.   Neurological:  Positive for weakness. Negative for dizziness.   Psychiatric/Behavioral:  Negative for agitation and confusion.    Objective:     Vital Signs (Most Recent):  Temp: 98 °F (36.7 °C) (10/26/22 0800)  Pulse: (!) 111 (10/26/22 0800)  Resp: 10 (10/26/22 0800)  BP: 111/73 (10/26/22 0800)  SpO2: 96 % (10/26/22 0800)   Vital Signs (24h Range):  Temp:  [97.5 °F (36.4 °C)-98 °F (36.7 °C)] 98 °F (36.7 °C)  Pulse:  [] 111  Resp:  [10-15] 10  SpO2:  [95 %-100 %] 96 %  BP: ()/(50-81) 111/73     Weight: 93.3 kg (205 lb 11 oz)  Body mass index is 27.9 kg/m².    Physical Exam  Vitals and nursing note reviewed.   Constitutional:       General: He is not in acute distress.     Appearance: He is well-developed. He is obese.   HENT:      Head: Normocephalic and atraumatic.   Eyes:      General: Scleral icterus present.      Extraocular Movements: Extraocular movements intact.   Cardiovascular:      Rate and Rhythm: Regular rhythm. Tachycardia present.   Pulmonary:      Effort: Pulmonary effort is normal. No respiratory distress.   Abdominal:      General: " There is distension.      Palpations: Abdomen is soft.   Musculoskeletal:         General: Normal range of motion.      Right lower leg: No edema.      Left lower leg: No edema.   Skin:     General: Skin is warm and dry.      Coloration: Skin is jaundiced.   Neurological:      Mental Status: He is alert and oriented to person, place, and time.   Psychiatric:         Mood and Affect: Affect is flat.         Behavior: Behavior is cooperative.       Review of Symptoms      Symptom Assessment (ESAS 0-10 Scale)  Pain:  0  Dyspnea:  0  Anxiety:  0  Nausea:  0  Depression:  0  Anorexia:  0  Fatigue:  2  Insomnia:  0  Restlessness:  0  Agitation:  0     CAM / Delirium:  Negative  Constipation:  Negative  Diarrhea:  Negative      Bowel Management Plan (BMP):  Yes      Pain Assessment:  OME in 24 hours:  0  Location(s): none      Performance Status:  50    Living Arrangements:  Lives alone    Psychosocial/Cultural: Patient is  and lives alone in [place]. He has two sons, ages 21 and 27, and two grandchildren. His mother is living and his father is . He has one sister. He used to own an appliance repair company. His goals are to recover from his illness and to get stronger.    Spiritual:  F - Joaquina and Belief:  Not spiritual or Amish  A - Address in Care:  Declines  visits      Advance Care Planning   Advance Directives:   Living Will: No    LaPOST: No    Do Not Resuscitate Status: No    Medical Power of : Yes    Agent's Name:  Lisa Jamil   Agent's Contact Number:  [phone]    Decision Making:  Patient answered questions and Family answered questions  Goals of Care: The patient endorses that what is most important right now is to focus on curative/life-prolongation (regardless of treatment burdens).    Accordingly, we have decided that the best plan to meet the patient's goals includes continuing with treatment.       Significant Labs: All pertinent labs within the past 24 hours  have been reviewed.  CBC:   Recent Labs   Lab 10/25/22  0724   WBC 15.50*   HGB 8.7*   HCT 26.3*   *   *     BMP:  No results for input(s): GLU, NA, K, CL, CO2, BUN, CREATININE, CALCIUM, MG in the last 24 hours.  LFT:  Lab Results   Component Value Date    AST 57 (H) 10/25/2022    ALKPHOS 139 (H) 10/25/2022    BILITOT 10.9 (H) 10/25/2022     Albumin:   Albumin   Date Value Ref Range Status   10/25/2022 3.3 (L) 3.5 - 5.2 g/dL Final   07/22/2015 4.3 3.5 - 5.0 G/DL Final     Protein:   Total Protein   Date Value Ref Range Status   10/25/2022 5.5 (L) 6.0 - 8.4 g/dL Final     Lactic acid:   No results found for: LACTATE    Significant Imaging: I have reviewed all pertinent imaging results/findings within the past 24 hours.        48 minutes spent in advanced care planning    Dolores Vazquez NP  Palliative Medicine  [facility] - Transplant Stepdown

## 2022-10-27 NOTE — SUBJECTIVE & OBJECTIVE
"Interval History: Patient is alert, oriented, and cooperative with visit today. No complaints of pain. Reports intermittent nausea but none presently. Mother and sister at bedside.    Past Medical History:   Diagnosis Date    Alcohol abuse 10/23/2022    Allergy     Guillain-Flint syndrome     Hypertension     Peripheral venous insufficiency 6/21/2021    Pulmonary hypertension 8/3/2021    Thrombosis of left saphenous vein 11/26/2019    Uncomplicated alcohol dependence 10/28/2019    Vitamin B12 deficiency (non anemic) 4/30/2020       Past Surgical History:   Procedure Laterality Date    ARTHROSCOPY OF KNEE  2001    Right knee scope    EYE SURGERY      bilateral lasic    INCISION AND DRAINAGE OF ABSCESS Right 10/18/2022    Procedure: INCISION AND DRAINAGE, ABSCESS-AXILLA;  Surgeon: Geovanni Lee MD;  Location: [facility] OR;  Service: General;  Laterality: Right;       Review of patient's allergies indicates:   Allergen Reactions    Codeine Itching, Anaphylaxis and Hives    Hydrocodone-acetaminophen      "Agitation"    Influenza virus vaccines Other (See Comments)    Oxycodone-acetaminophen      "Agitation"       Medications:  Continuous Infusions:  Scheduled Meds:   folic acid  1,000 mcg Oral Daily    Lactobacillus rhamnosus GG  1 capsule Oral Daily    lactulose  10 g Oral BID    miconazole NITRATE 2 %   Topical (Top) BID    midodrine  10 mg Oral Q8H    octreotide  100 mcg Subcutaneous Q8H    pregabalin  75 mg Oral BID    sodium bicarbonate  1,300 mg Oral TID    thiamine  100 mg Oral Daily     PRN Meds:acetaminophen, dextrose 10%, dextrose 10%, miconazole nitrate 2%, ondansetron, prochlorperazine    Family History    No relevant family history       Tobacco Use    Smoking status: Never    Smokeless tobacco: Current     Types: Chew   Substance and Sexual Activity    Alcohol use: Not Currently     Comment: "quit 8wks ago" as of 10/15/2022    Drug use: Never    Sexual activity: Not on file "       Review of Systems   Constitutional:  Positive for activity change and fatigue.   HENT:  Negative for sore throat and trouble swallowing.    Respiratory:  Negative for cough and shortness of breath.    Cardiovascular:  Negative for chest pain and leg swelling.   Gastrointestinal:  Positive for abdominal distention and nausea (intermittent).   Genitourinary:  Negative for dysuria and flank pain.   Musculoskeletal:  Negative for back pain and neck pain.   Skin:  Positive for color change. Negative for rash.   Neurological:  Positive for weakness. Negative for dizziness.   Psychiatric/Behavioral:  Negative for agitation and confusion.    Objective:     Vital Signs (Most Recent):  Temp: 98 °F (36.7 °C) (10/27/22 1153)  Pulse: 105 (10/27/22 1153)  Resp: 18 (10/27/22 0728)  BP: 98/64 (10/27/22 1153)  SpO2: 99 % (10/27/22 1153)   Vital Signs (24h Range):  Temp:  [97.8 °F (36.6 °C)-98.4 °F (36.9 °C)] 98 °F (36.7 °C)  Pulse:  [105-113] 105  Resp:  [15-18] 18  SpO2:  [94 %-99 %] 99 %  BP: ()/(51-77) 98/64     Weight: 93.3 kg (205 lb 11 oz)  Body mass index is 27.9 kg/m².    Physical Exam  Vitals and nursing note reviewed.   Constitutional:       General: He is not in acute distress.     Appearance: He is well-developed. He is obese.   HENT:      Head: Normocephalic and atraumatic.   Eyes:      General: Scleral icterus present.      Extraocular Movements: Extraocular movements intact.   Cardiovascular:      Rate and Rhythm: Regular rhythm. Tachycardia present.   Pulmonary:      Effort: Pulmonary effort is normal. No respiratory distress.   Abdominal:      General: There is distension.      Palpations: Abdomen is soft.   Musculoskeletal:         General: Normal range of motion.      Right lower leg: No edema.      Left lower leg: No edema.   Skin:     General: Skin is warm and dry.      Coloration: Skin is jaundiced.   Neurological:      Mental Status: He is alert and oriented to person, place, and time.    Psychiatric:         Mood and Affect: Affect is flat.         Behavior: Behavior is cooperative.       Review of Symptoms      Symptom Assessment (ESAS 0-10 Scale)  Pain:  0  Dyspnea:  0  Anxiety:  0  Nausea:  0  Depression:  0  Anorexia:  0  Fatigue:  0  Insomnia:  0  Restlessness:  0  Agitation:  0     CAM / Delirium:  Negative  Constipation:  Negative  Diarrhea:  Negative      Bowel Management Plan (BMP):  Yes      Pain Assessment:  OME in 24 hours:  0  Location(s): none      Performance Status:  50    Living Arrangements:  Lives alone    Psychosocial/Cultural: Patient is  and lives alone in [place]. He has two sons, ages 21 and 27, and two grandchildren. His mother is living and his father is . He has one sister. He used to own an appliance repair company. His goals are to recover from his illness and to get stronger.    Spiritual:  F - Joaquina and Belief:  Not spiritual or Sikhism  A - Address in Care:  Declines  visits      Advance Care Planning   Advance Directives:   Living Will: No    [place]POST: No    Do Not Resuscitate Status: No    Medical Power of : Yes    Agent's Name:  Lisa Jamil   Agent's Contact Number:  [phone]    Decision Making:  Patient answered questions and Family answered questions  Goals of Care: What is most important right now is to focus on curative/life-prolongation (regardless of treatment burdens). Accordingly, we have decided that the best plan to meet the patient's goals includes continuing with treatment.       Significant Labs: All pertinent labs within the past 24 hours have been reviewed.  CBC:   Recent Labs   Lab 10/27/22  0518   WBC 22.53*   HGB 8.4*   HCT 25.3*   *   *       BMP:  Recent Labs   Lab 10/27/22  0518   *   *   K 4.1   CL 99   CO2 20*   BUN 58*   CREATININE 3.8*   CALCIUM 8.4*     LFT:  Lab Results   Component Value Date    AST 42 (H) 10/27/2022    ALKPHOS 149 (H) 10/27/2022    BILITOT 11.9 (H)  10/27/2022     Albumin:   Albumin   Date Value Ref Range Status   10/27/2022 3.1 (L) 3.5 - 5.2 g/dL Final   07/22/2015 4.3 3.5 - 5.0 G/DL Final     Protein:   Total Protein   Date Value Ref Range Status   10/27/2022 5.5 (L) 6.0 - 8.4 g/dL Final     Lactic acid:   No results found for: LACTATE    Significant Imaging: I have reviewed all pertinent imaging results/findings within the past 24 hours.

## 2022-10-27 NOTE — ASSESSMENT & PLAN NOTE
- PETH negative  - reported last drink was few months ago  -continue MVI & Folic acid, add thiamine supplementation

## 2022-10-27 NOTE — PROGRESS NOTES
"[facility] - Transplant Stepdown  Nephrology  Progress Note    Patient Name: Marshall Corona  MRN: [id number]  Admission Date: 10/22/2022  Hospital Length of Stay: 5 days  Attending Provider: Amando Knight MD   Primary Care Physician: Primary Doctor No  Principal Problem:KATHE (acute kidney injury)    Subjective:     HPI: Marshall Corona is a 47-year-old male with hypertension, diabetes, alcoholic cirrhosis who presented to OSH with weakness and abdominal distention. Patient was subsequently transferred to [facility] [place] for liver transplant evaluation.  During hospitalization at OSH patient noted to have right axillary abscess which was drained and he was subsequently started on clindamycin and transitioned to linezolid.  Additionally patient with worsening renal function during admission with creatinine of 1.4 on admit with subsequent worsening up to 3.8 at the time of transfer.  Outside records noting worsening renal function following paracentesis and associated hypotension for which he received albumin and was started on midodrine.  Nephrology was consulted for the management of KATHE.      Interval History: NAEON. Patient feels well today, no change in abdominal pain over yesterday. Making small amounts of urine. Tolerating PO. Was denied workup for liver tx yesterday. Kidney function continues to worsen.     Review of patient's allergies indicates:   Allergen Reactions    Codeine Itching, Anaphylaxis and Hives    Hydrocodone-acetaminophen      "Agitation"    Influenza virus vaccines Other (See Comments)    Oxycodone-acetaminophen      "Agitation"     Current Facility-Administered Medications   Medication Frequency    acetaminophen tablet 650 mg Q8H PRN    dextrose 10% bolus 125 mL PRN    dextrose 10% bolus 250 mL PRN    folic acid tablet 1,000 mcg Daily    Lactobacillus rhamnosus GG capsule 1 capsule Daily    lactulose 20 gram/30 mL solution Soln 10 g BID    miconazole NITRATE 2 % top powder BID    miconazole " nitrate 2% ointment PRN    midodrine tablet 10 mg Q8H    octreotide injection 100 mcg Q8H    ondansetron injection 4 mg Q6H PRN    pregabalin capsule 75 mg BID    prochlorperazine injection Soln 5 mg Q6H PRN    sodium bicarbonate tablet 1,300 mg TID    thiamine tablet 100 mg Daily       Objective:     Vital Signs (Most Recent):  Temp: 98 °F (36.7 °C) (10/27/22 1153)  Pulse: 105 (10/27/22 1153)  Resp: 18 (10/27/22 0728)  BP: 98/64 (10/27/22 1153)  SpO2: 99 % (10/27/22 1153)  O2 Device (Oxygen Therapy): room air (10/27/22 0728) Vital Signs (24h Range):  Temp:  [97.8 °F (36.6 °C)-98.4 °F (36.9 °C)] 98 °F (36.7 °C)  Pulse:  [105-113] 105  Resp:  [15-18] 18  SpO2:  [94 %-99 %] 99 %  BP: ()/(51-77) 98/64     Weight: 93.3 kg (205 lb 11 oz) (10/26/22 0536)  Body mass index is 27.9 kg/m².  Body surface area is 2.18 meters squared.    I/O last 3 completed shifts:  In: 320.3 [P.O.:125; I.V.:195.3]  Out: 0     Physical Exam  Constitutional:       Appearance: Normal appearance.   Eyes:      General: Scleral icterus present.      Conjunctiva/sclera: Conjunctivae normal.   Cardiovascular:      Rate and Rhythm: Normal rate and regular rhythm.      Pulses: Normal pulses.      Heart sounds: Normal heart sounds.   Pulmonary:      Effort: Pulmonary effort is normal. No respiratory distress.      Breath sounds: Normal breath sounds.   Abdominal:      General: Bowel sounds are normal. There is distension.      Palpations: Abdomen is soft.      Tenderness: There is no abdominal tenderness.   Musculoskeletal:      Right lower leg: No edema.      Left lower leg: No edema.   Skin:     General: Skin is warm and dry.      Coloration: Skin is jaundiced.      Findings: No bruising.   Neurological:      Mental Status: He is alert and oriented to person, place, and time.   No significant change in exam today over yesterday.     Significant Labs:  CBC:   Recent Labs   Lab 10/27/22  0518   WBC 22.53*   RBC 2.41*   HGB 8.4*   HCT 25.3*    *   *   MCH 34.9*   MCHC 33.2       CMP:   Recent Labs   Lab 10/27/22  0518   *   CALCIUM 8.4*   ALBUMIN 3.1*   PROT 5.5*   *   K 4.1   CO2 20*   CL 99   BUN 58*   CREATININE 3.8*   ALKPHOS 149*   ALT 22   AST 42*   BILITOT 11.9*       All labs within the past 24 hours have been reviewed.     Significant Imaging:       Assessment/Plan:     * KATHE (acute kidney injury)  Patient with baseline creatinine of 1.0 which worsened to creatinine of 1.4 at the time of admission to OSH and subsequently 3.8 upon transfer to [facility].  Notably, creatinine 2.4 upon repeat so suspect 3.8 was erroneous. In the setting of cirrhosis, there was some concern for hepatorenal syndrome so patient was started on albumin and midodrine.  Urine sodium (<10) consistent with hepatorenal syndrome but patient with robust blood pressures so less likely.  Lower suspicion for abdominal compartment syndrome given recent paracentesis.  Bilirubin elevated.  Suspect either HRS versus ATN v bilirubin tubulopathy.     Cr continues to increase, 3.8 on 10/27. MAPs maintaining in 70s-90s. S/p paracentesis with 6.3L removed on 10/24. If kidney function continues to worsen, anticipate patient is heading towards RRT in the future.     Recommendations:   - no urgent indication for RRT at this time   - okay to continue HRS regimen at this time  - s/p albumin protocol for volume expansion  - strict intake and output  - maintain map > 85 if treating for HRS  - renally dose medications and avoid nephrotoxins when possible        Attending attestation with final recs to follow.       Thank you for your consult. I will follow-up with patient. Please contact us if you have any additional questions.    Duy Hdz MD  Nephrology  [facility] - Transplant Stepdown

## 2022-10-27 NOTE — ASSESSMENT & PLAN NOTE
Patient with baseline creatinine of 1.0 which worsened to creatinine of 1.4 at the time of admission to OSH and subsequently 3.8 upon transfer to [facility] [place].  Notably, creatinine 2.4 upon repeat so suspect 3.8 was erroneous. In the setting of cirrhosis, there was some concern for hepatorenal syndrome so patient was started on albumin and midodrine.  Urine sodium (<10) consistent with hepatorenal syndrome but patient with robust blood pressures so less likely.  Lower suspicion for abdominal compartment syndrome given recent paracentesis.  Bilirubin elevated.  Suspect either HRS versus ATN v bilirubin tubulopathy.     Cr continues to increase, 3.8 on 10/27. MAPs maintaining in 70s-90s. S/p paracentesis with 6.3L removed on 10/24. If kidney function continues to worsen, anticipate patient is heading towards RRT in the future.     Recommendations:   - no urgent indication for RRT at this time   - okay to continue HRS regimen at this time  - s/p albumin protocol for volume expansion  - strict intake and output  - maintain map > 85 if treating for HRS  - renally dose medications and avoid nephrotoxins when possible

## 2022-10-27 NOTE — ASSESSMENT & PLAN NOTE
MELD-Na score: 36 at 10/27/2022  5:18 AM  MELD score: 35 at 10/27/2022  5:18 AM  Calculated from:  Serum Creatinine: 3.8 mg/dL at 10/27/2022  5:18 AM  Serum Sodium: 129 mmol/L at 10/27/2022  5:18 AM  Total Bilirubin: 11.9 mg/dL at 10/27/2022  5:18 AM  INR(ratio): 1.8 at 10/27/2022  5:18 AM  Age: 47 years  Not a transplant evaluation candidate given limited sobriety and lack of family in [place].  - Continuing midodrine and octreotide for HRS  - Continuing lactulose and rifaximin for HE  - s/p Vitamin K x3 days  - Para on admit negative for SBP.

## 2022-10-27 NOTE — SUBJECTIVE & OBJECTIVE
Interval History: No acute events overnight. Denies any pain at this time. Discussed next steps and he is aware that his family is attempting to get him to [place] and get [place] to discuss possible transplant there as he has no social support in [place].    Review of Systems   Constitutional:  Negative for chills and fever.   HENT:  Negative for congestion and sore throat.    Eyes:  Negative for photophobia and visual disturbance.   Respiratory:  Negative for cough and shortness of breath.    Cardiovascular:  Negative for chest pain and palpitations.   Gastrointestinal:  Positive for abdominal distention. Negative for abdominal pain, blood in stool, constipation, diarrhea, nausea and vomiting.   Endocrine: Negative for cold intolerance and heat intolerance.   Genitourinary:  Negative for difficulty urinating, dysuria and hematuria.   Musculoskeletal:  Negative for arthralgias and myalgias.   Skin:  Positive for color change (jaundice). Negative for rash and wound.   Allergic/Immunologic: Negative for environmental allergies and food allergies.   Neurological:  Negative for seizures and numbness.   Hematological:  Negative for adenopathy. Does not bruise/bleed easily.   Psychiatric/Behavioral:  Negative for hallucinations and suicidal ideas.    Objective:     Vital Signs (Most Recent):  Temp: 98 °F (36.7 °C) (10/27/22 1153)  Pulse: 105 (10/27/22 1153)  Resp: 18 (10/27/22 0728)  BP: 98/64 (10/27/22 1153)  SpO2: 99 % (10/27/22 1153) Vital Signs (24h Range):  Temp:  [97.8 °F (36.6 °C)-98.4 °F (36.9 °C)] 98 °F (36.7 °C)  Pulse:  [105-113] 105  Resp:  [15-18] 18  SpO2:  [94 %-99 %] 99 %  BP: ()/(51-77) 98/64     Weight: 93.3 kg (205 lb 11 oz)  Body mass index is 27.9 kg/m².    Intake/Output Summary (Last 24 hours) at 10/27/2022 1547  Last data filed at 10/27/2022 1321  Gross per 24 hour   Intake 480 ml   Output --   Net 480 ml      Physical Exam  Constitutional:       Appearance: He is well-developed. He  is ill-appearing. He is not toxic-appearing.   HENT:      Head: Normocephalic and atraumatic.   Eyes:      General: Scleral icterus present.      Conjunctiva/sclera: Conjunctivae normal.      Pupils: Pupils are equal, round, and reactive to light.   Neck:      Thyroid: No thyromegaly.      Vascular: No JVD.   Cardiovascular:      Rate and Rhythm: Normal rate and regular rhythm.      Heart sounds: Normal heart sounds. No murmur heard.    No friction rub. No gallop.   Pulmonary:      Effort: Pulmonary effort is normal. No respiratory distress.      Breath sounds: Normal breath sounds. No wheezing or rales.   Abdominal:      General: Bowel sounds are normal. There is distension.      Palpations: Abdomen is soft. There is no mass.      Tenderness: There is no abdominal tenderness. There is no guarding or rebound.      Hernia: No hernia is present.   Musculoskeletal:         General: No deformity. Normal range of motion.      Cervical back: Normal range of motion and neck supple.   Skin:     General: Skin is warm and dry.      Coloration: Skin is jaundiced.   Neurological:      Mental Status: He is alert and oriented to person, place, and time.      Cranial Nerves: No cranial nerve deficit.   Psychiatric:         Behavior: Behavior normal.       Significant Labs: All pertinent labs within the past 24 hours have been reviewed.  CBC:   Recent Labs   Lab 10/26/22  1018 10/27/22  0518   WBC 21.44* 22.53*   HGB 8.6* 8.4*   HCT 26.3* 25.3*   * 108*     CMP:   Recent Labs   Lab 10/26/22  1018 10/27/22  0518   * 129*   K 4.0 4.1   CL 99 99   CO2 19* 20*   * 157*   BUN 53* 58*   CREATININE 3.3* 3.8*   CALCIUM 8.7 8.4*   PROT 5.6* 5.5*   ALBUMIN 3.4* 3.1*   BILITOT 11.0* 11.9*   ALKPHOS 144* 149*   AST 51* 42*   ALT 22 22   ANIONGAP 14 10       Significant Imaging: I have reviewed all pertinent imaging results/findings within the past 24 hours.

## 2022-10-27 NOTE — PLAN OF CARE
Pt AOX4, VSS, afebrile.   No c/o pain/N/V.   PERLA axillae dressing CDI- wound care MWF  PT/OT working with patient  Pt up to toilet with +1 assit and walker  Lactulose continued  Fall and infection precautions maintained throughout shift  Pt in lowest settings, call light w/in reach, nonskid socks when ambulating, remains free from falls. NAD. WCTM.

## 2022-10-27 NOTE — ASSESSMENT & PLAN NOTE
Worsening  Patient with acute kidney injury likely due to HRS vs bile cast nephropathy vs ATN KATHE is currently worsening. Labs reviewed- Renal function/electrolytes with Estimated Creatinine Clearance: 27.9 mL/min (A) (based on SCr of 3.8 mg/dL (H)). according to latest data. Monitor urine output and serial BMP and adjust therapy as needed. Avoid nephrotoxins and renally dose meds for GFR listed above.   - Nephrology following  - Appears to be consistent with hepatorenal syndrome  Worsening GFR with metabolic acidosis  - Cont midodrine, octreotide, did not improve with albumin

## 2022-10-27 NOTE — PT/OT/SLP PROGRESS
Physical Therapy Treatment    Patient Name:  Marshall Corona   MRN:  [id number]  Admitting Diagnosis: KATHE (acute kidney injury)  Recent Surgery: * No surgery found *      Recommendations:     Discharge Recommendations:  rehabilitation facility   Discharge Equipment Recommendations: bedside commode   Barriers to discharge: Decreased caregiver support at current level of function    Plan:     During this hospitalization, patient to be seen 4 x/week to address the above listed problems via gait training, therapeutic activities, therapeutic exercises, neuromuscular re-education  Plan of Care Expires:  11/24/22  Plan of Care Reviewed with: patient    This Plan of care has been discussed with the patient who was involved in its development and understands and is in agreement with the identified goals and treatment plan    Subjective     Communicated with nurse (Joe NIXON) prior to session.     Patient comments: pt with c/o fatigue  Pain/Comfort:  Pain Rating 1: 0/10  Pain Rating Post-Intervention 1: 0/10    Objective:     Patient found with: bed alarm    Patient found sup in bed with HOB elevated at 30* angle upon PT entry to room, agreeable to treatment.  No family present in the room.    Respiratory Status: Room air    General Precautions: Standard, fall   Orthopedic Precautions:N/A   Braces: N/A       BED MOBILITY (vc's for hand placement sequencing of task):        Rolling to the R:  min A.       Rolling to the L:  NT.        Sup > sit at the EOB:  min A for trunk elevation, exiting on the R side.       Sit > sup:  mod A for trunk and LE's.       Scooting hips to EOB with min A                SITTING AT THE EDGE OF THE BED    Assistance Level Required: SBA for safety with B UE support   Postural deviations noted: flexed trunk   Encouraged: upright posture, B feet flat on floor        TRANSFERS  (vc's for hand placement, sequencing of task and safety)   Patient completed Sit <> Stand Transfer from EOB with min/CGA of 1,  from BS chair with mod A of 1 for hip elevation and balance (1 moderate LOB) with rolling walker x2 trial(s)   Patient completed Stand <> Sit Transfer to BS chair/EOB with CGA for balance and safety with rolling walker      GAIT: in hallway, tech follows with chair   Patient ambulated: 55ft and then 60ft, seated rest break in between trials   Patient required: CGA for balance and safety   Patient used:  Rolling Walker   Gait Pattern observed: reciprocal gait   Gait Deviation(s): increased hollis, decreased step length, decreased stride length, decreased toe-to-floor clearance, and instability    Comments: vc's for decreased hollis, directional guidance, placement of AD, and safety      EDUCATION  Patient provided with daily orientation and goals of this PT session. They were educated to call for assistance and to transfer with hospital staff only.  Also, pt was educated on the effects of prolonged immobility and the importance of performing OOB activity and exercises to promote healing and reduce recovery time    Whiteboard updated with correct mobility information. RN/PCT notified.  Pt safe to transfer OOB/BTB and amb short distances with RN/PCT: Use RW with min A.    Patient left HOB elevated, with  all lines intact, call button in reach, and nurse notified    AM-PAC 6 CLICK MOBILITY  Turning over in bed (including adjusting bedclothes, sheets and blankets)?: 3  Sitting down on and standing up from a chair with arms (e.g., wheelchair, bedside commode, etc.): 3  Moving from lying on back to sitting on the side of the bed?: 3  Moving to and from a bed to a chair (including a wheelchair)?: 3  Need to walk in hospital room?: 3  Climbing 3-5 steps with a railing?: 1  Basic Mobility Total Score: 16     Assessment:     Marshall Corona is a 47 y.o. male admitted with a medical diagnosis of KATHE (acute kidney injury).  He presents with the following impairments/functional limitations:  weakness, impaired endurance,  impaired self care skills, impaired functional mobility, gait instability, impaired balance, decreased coordination, decreased upper extremity function, decreased lower extremity function, decreased safety awareness, impaired cardiopulmonary response to activity. requiring moderate to light assistance and verbal cues for bed mob, sit < > stand transitions, standing, and gait to prevent falls due to weakness, instability, and fatigue.   In light of pt's current functional level and deficits, it is anticipated that pt will need to participate in an intense rehab program consisting of PT and OT in order to achieve full rehab potential to return to previous level of function and roles.  Pt remains motivated to participate in PT session and will cont to benefit from skilled PT intervention.    Rehab Prognosis:  Good; patient would benefit from acute skilled PT services to address these deficits and reach maximum level of function.      GOALS:   Multidisciplinary Problems       Physical Therapy Goals          Problem: Physical Therapy    Goal Priority Disciplines Outcome Goal Variances Interventions   Physical Therapy Goal     PT, PT/OT Ongoing, Progressing     Description: Goals to be met by: 2022     Patient will increase functional independence with mobility by performin. Supine to sit with Set-up [place]  2. Sit to stand transfer with supervision  3. Gait  x 150 feet with Supervision using Rolling Walker.   4. Ascend/descend 3 stair with bilateral Handrails Minimal Assistance using No Assistive Device.   5. Stand for 5 minutes with Modified [place] using Rolling Walker or no AD                         Time Tracking:     PT Received On: 10/27/22  PT Start Time: 1438     PT Stop Time: 1459  PT Total Time (min): 21 min     Billable Minutes: Gait Training 21    Treatment Type: Treatment  PT/PTA: PTA     PTA Visit Number: 2       MARLY Marie.  Pager [phone]    10/27/2022    .

## 2022-10-27 NOTE — PROGRESS NOTES
AAOx4. VSS. Patient up w/ standby assist OOB to bathroom. WBC elevated this AM on labs - 22.53 (up from 21.44 prior). Cr 3.8 (up from 3.3 prior). Patient voids unmeasured in toilet - minimal nasreen output noted this shift - see flowsheet for exact UOP amount. Patient had 1 BM so far this shift. R UA axillary dressing CDI - wound care done M,W,F. Patient worked w/ PT today - ambulated in room/hallway w/ walker. Bed in low position. Non-skid socks on. Call light within reach.

## 2022-10-27 NOTE — SUBJECTIVE & OBJECTIVE
"Interval History: NAEON. Patient feels well today, no change in abdominal pain over yesterday. Making small amounts of urine. Tolerating PO. Was denied workup for liver tx yesterday. Kidney function continues to worsen.     Review of patient's allergies indicates:   Allergen Reactions    Codeine Itching, Anaphylaxis and Hives    Hydrocodone-acetaminophen      "Agitation"    Influenza virus vaccines Other (See Comments)    Oxycodone-acetaminophen      "Agitation"     Current Facility-Administered Medications   Medication Frequency    acetaminophen tablet 650 mg Q8H PRN    dextrose 10% bolus 125 mL PRN    dextrose 10% bolus 250 mL PRN    folic acid tablet 1,000 mcg Daily    Lactobacillus rhamnosus GG capsule 1 capsule Daily    lactulose 20 gram/30 mL solution Soln 10 g BID    miconazole NITRATE 2 % top powder BID    miconazole nitrate 2% ointment PRN    midodrine tablet 10 mg Q8H    octreotide injection 100 mcg Q8H    ondansetron injection 4 mg Q6H PRN    pregabalin capsule 75 mg BID    prochlorperazine injection Soln 5 mg Q6H PRN    sodium bicarbonate tablet 1,300 mg TID    thiamine tablet 100 mg Daily       Objective:     Vital Signs (Most Recent):  Temp: 98 °F (36.7 °C) (10/27/22 1153)  Pulse: 105 (10/27/22 1153)  Resp: 18 (10/27/22 0728)  BP: 98/64 (10/27/22 1153)  SpO2: 99 % (10/27/22 1153)  O2 Device (Oxygen Therapy): room air (10/27/22 0728) Vital Signs (24h Range):  Temp:  [97.8 °F (36.6 °C)-98.4 °F (36.9 °C)] 98 °F (36.7 °C)  Pulse:  [105-113] 105  Resp:  [15-18] 18  SpO2:  [94 %-99 %] 99 %  BP: ()/(51-77) 98/64     Weight: 93.3 kg (205 lb 11 oz) (10/26/22 0536)  Body mass index is 27.9 kg/m².  Body surface area is 2.18 meters squared.    I/O last 3 completed shifts:  In: 320.3 [P.O.:125; I.V.:195.3]  Out: 0     Physical Exam  Constitutional:       Appearance: Normal appearance.   Eyes:      General: Scleral icterus present.      Conjunctiva/sclera: Conjunctivae normal.   Cardiovascular:      Rate and " Rhythm: Normal rate and regular rhythm.      Pulses: Normal pulses.      Heart sounds: Normal heart sounds.   Pulmonary:      Effort: Pulmonary effort is normal. No respiratory distress.      Breath sounds: Normal breath sounds.   Abdominal:      General: Bowel sounds are normal. There is distension.      Palpations: Abdomen is soft.      Tenderness: There is no abdominal tenderness.   Musculoskeletal:      Right lower leg: No edema.      Left lower leg: No edema.   Skin:     General: Skin is warm and dry.      Coloration: Skin is jaundiced.      Findings: No bruising.   Neurological:      Mental Status: He is alert and oriented to person, place, and time.   No significant change in exam today over yesterday.     Significant Labs:  CBC:   Recent Labs   Lab 10/27/22  0518   WBC 22.53*   RBC 2.41*   HGB 8.4*   HCT 25.3*   *   *   MCH 34.9*   MCHC 33.2       CMP:   Recent Labs   Lab 10/27/22  0518   *   CALCIUM 8.4*   ALBUMIN 3.1*   PROT 5.5*   *   K 4.1   CO2 20*   CL 99   BUN 58*   CREATININE 3.8*   ALKPHOS 149*   ALT 22   AST 42*   BILITOT 11.9*       All labs within the past 24 hours have been reviewed.     Significant Imaging:

## 2022-10-27 NOTE — PROGRESS NOTES
[facility]  Progress Note    Patient Name: Marshall Corona  MRN: [id number]  Patient Class: IP- Inpatient   Admission Date: 10/22/2022  Length of Stay: 5 days  Attending Physician: Amando Knight MD  Primary Care Provider: Primary Doctor No        Subjective:     Principal Problem:KATHE (acute kidney injury)        HPI:  TRANSFER CENTER  PHYSICIAN SUMMARY  47-year-old m with PMH HTN, GBS, diabetes, presumed alcoholic cirrhosis admitted to [facility] on 10/15 with weakness and abd distension.  Patient reports that his last drink was 2 months ago.     On admission VS unremarkable.  PEx pos for scleral icterus and abd distension with no tenderness.  He was AOX3.  WBC 12.9, Hb 7.2, Plts 142, INR 1.6 > 1. Na 132, K 3.7, CO2 21, BUN 50, Cr 1.41 (BL 0.91), bilirubin 8.8, AST 68, ALT 37, HV A/B/C neg.  COVID neg.       CT abd pos for cirrhosis with moderate volume ascites.  Also, 1.4 x 1 cm nonspecific hypoattenuating lesion in near the splenic hilum.  US abdomen pos for cirrhotic liver.  Stasis flow suggestive of portal HTN.  Moderate ascites.       On 10/18 the patient was noted to have a right axillary abscess a/w rising WBC.  The abscess was drained and patient placed on clindamycin > linezolid and CTX.  Cultures NGTD.     Hospitalization a/w increasing renal failure with  Cr 1.41 (10/15) > 1.66 (10/19) > 2 (10/21) > 3.8 (10/22) following a large volume paracentesis on 10/21.  BP fell into the mid 90s briefly following the procedure. Patient was given albumin (25 g x 4 ) and placed on midodrine     Also hosppitalization a/w increasing liver failure: T bili 8.8 > > 10.2, AST 68 > > 88. MELD Na 35     Transfer requested for liver XPL evaluation.  Transfer diagnosis acute liver failure, acute renal failure with concern for hepatorenal syndrome.     BEDSIDE EVAL    At bedside patient reports he feels ok.  Before paracentesis on 10/21, he's had one prior at a another hospital earlier this  month where 4L was removed.  He feels after recent paracentesis he has increased abdominal swelling and pain, feels fluid has reaccumulated faster or he is more bloated.  Reports last alcohol use was a few months ago, states he quit prior to diagnosis of liver disease.     Reports having Guillain [place] syndrome in 2019 after an insect bite, stated that he has not fully recovered from this, after his discharge and receiving rehab, continued as an outpatient but when pandemic started in 2020, could not continue prior rehab efforts and reports chronic debility as a result.   \  He uses smokeless tobacco Natalia Dip.  He denies any hx of Intra-nasal or injection illicit drug use.       Overview/Hospital Course:  Patient with zero family in [place], majority in [place]. Social support and limited sobriety precluding him from transplant evaluation here. Patient's family requesting transfer [facility] at this time. They are in the process of procuring [place] medicaid. Patient otherwise remains stable but is still very ill with MELD of 36      Interval History: No acute events overnight. Denies any pain at this time. Discussed next steps and he is aware that his family is attempting to get him to [place] and get [place] to discuss possible transplant there as he has no social support in [place].    Review of Systems   Constitutional:  Negative for chills and fever.   HENT:  Negative for congestion and sore throat.    Eyes:  Negative for photophobia and visual disturbance.   Respiratory:  Negative for cough and shortness of breath.    Cardiovascular:  Negative for chest pain and palpitations.   Gastrointestinal:  Positive for abdominal distention. Negative for abdominal pain, blood in stool, constipation, diarrhea, nausea and vomiting.   Endocrine: Negative for cold intolerance and heat intolerance.   Genitourinary:  Negative for difficulty urinating, dysuria and hematuria.   Musculoskeletal:   Negative for arthralgias and myalgias.   Skin:  Positive for color change (jaundice). Negative for rash and wound.   Allergic/Immunologic: Negative for environmental allergies and food allergies.   Neurological:  Negative for seizures and numbness.   Hematological:  Negative for adenopathy. Does not bruise/bleed easily.   Psychiatric/Behavioral:  Negative for hallucinations and suicidal ideas.    Objective:     Vital Signs (Most Recent):  Temp: 98 °F (36.7 °C) (10/27/22 1153)  Pulse: 105 (10/27/22 1153)  Resp: 18 (10/27/22 0728)  BP: 98/64 (10/27/22 1153)  SpO2: 99 % (10/27/22 1153) Vital Signs (24h Range):  Temp:  [97.8 °F (36.6 °C)-98.4 °F (36.9 °C)] 98 °F (36.7 °C)  Pulse:  [105-113] 105  Resp:  [15-18] 18  SpO2:  [94 %-99 %] 99 %  BP: ()/(51-77) 98/64     Weight: 93.3 kg (205 lb 11 oz)  Body mass index is 27.9 kg/m².    Intake/Output Summary (Last 24 hours) at 10/27/2022 1547  Last data filed at 10/27/2022 1321  Gross per 24 hour   Intake 480 ml   Output --   Net 480 ml      Physical Exam  Constitutional:       Appearance: He is well-developed. He is ill-appearing. He is not toxic-appearing.   HENT:      Head: Normocephalic and atraumatic.   Eyes:      General: Scleral icterus present.      Conjunctiva/sclera: Conjunctivae normal.      Pupils: Pupils are equal, round, and reactive to light.   Neck:      Thyroid: No thyromegaly.      Vascular: No JVD.   Cardiovascular:      Rate and Rhythm: Normal rate and regular rhythm.      Heart sounds: Normal heart sounds. No murmur heard.    No friction rub. No gallop.   Pulmonary:      Effort: Pulmonary effort is normal. No respiratory distress.      Breath sounds: Normal breath sounds. No wheezing or rales.   Abdominal:      General: Bowel sounds are normal. There is distension.      Palpations: Abdomen is soft. There is no mass.      Tenderness: There is no abdominal tenderness. There is no guarding or rebound.      Hernia: No hernia is present.   Musculoskeletal:          General: No deformity. Normal range of motion.      Cervical back: Normal range of motion and neck supple.   Skin:     General: Skin is warm and dry.      Coloration: Skin is jaundiced.   Neurological:      Mental Status: He is alert and oriented to person, place, and time.      Cranial Nerves: No cranial nerve deficit.   Psychiatric:         Behavior: Behavior normal.       Significant Labs: All pertinent labs within the past 24 hours have been reviewed.  CBC:   Recent Labs   Lab 10/26/22  1018 10/27/22  0518   WBC 21.44* 22.53*   HGB 8.6* 8.4*   HCT 26.3* 25.3*   * 108*     CMP:   Recent Labs   Lab 10/26/22  1018 10/27/22  0518   * 129*   K 4.0 4.1   CL 99 99   CO2 19* 20*   * 157*   BUN 53* 58*   CREATININE 3.3* 3.8*   CALCIUM 8.7 8.4*   PROT 5.6* 5.5*   ALBUMIN 3.4* 3.1*   BILITOT 11.0* 11.9*   ALKPHOS 144* 149*   AST 51* 42*   ALT 22 22   ANIONGAP 14 10       Significant Imaging: I have reviewed all pertinent imaging results/findings within the past 24 hours.      Assessment/Plan:      * KATHE (acute kidney injury)  Worsening  Patient with acute kidney injury likely due to HRS vs bile cast nephropathy vs ATN KATHE is currently worsening. Labs reviewed- Renal function/electrolytes with Estimated Creatinine Clearance: 27.9 mL/min (A) (based on SCr of 3.8 mg/dL (H)). according to latest data. Monitor urine output and serial BMP and adjust therapy as needed. Avoid nephrotoxins and renally dose meds for GFR listed above.   - Nephrology following  - Appears to be consistent with hepatorenal syndrome  Worsening GFR with metabolic acidosis  - Cont midodrine, octreotide, did not improve with albumin      Decompensated hepatic cirrhosis  See alcoholic cirrhosis      Alcohol dependence in remission  - PETH negative  - reported last drink was few months ago  -continue MVI & Folic acid, add thiamine supplementation      Hyponatremia  Secondary to underlying cirrhosis   - holding diuretics  - Oral  fluid restriction         Abscess of right arm  S/p I &D at outside hospital  - patient was on Linezolid,  Ceftriaxone added here. Now off abx        Debility  Hx of Guillain barre syndrome - reports not fully recovered, left with chronic debility   -PT/OT consults.       Alcoholic cirrhosis  MELD-Na score: 36 at 10/27/2022  5:18 AM  MELD score: 35 at 10/27/2022  5:18 AM  Calculated from:  Serum Creatinine: 3.8 mg/dL at 10/27/2022  5:18 AM  Serum Sodium: 129 mmol/L at 10/27/2022  5:18 AM  Total Bilirubin: 11.9 mg/dL at 10/27/2022  5:18 AM  INR(ratio): 1.8 at 10/27/2022  5:18 AM  Age: 47 years  Not a transplant evaluation candidate given limited sobriety and lack of family in [place].  - Continuing midodrine and octreotide for HRS  - Continuing lactulose and rifaximin for HE  - s/p Vitamin K x3 days  - Para on admit negative for SBP.      Symptomatic anemia  Resolved, Required PRBC transfusion prior to transfer.   -trend daily CBC  -no active GI bleeding.         VTE Risk Mitigation (From admission, onward)         Ordered     Place sequential compression device  Until discontinued         10/22/22 2124                Discharge Planning   YARITZA: 10/29/2022     Code Status: Full Code   Is the patient medically ready for discharge?: No    Reason for patient still in hospital (select all that apply): Patient trending condition  Discharge Plan A: Home with family                  Amando Knight MD  Department of Hospital Medicine   [facility] - Trans[facility] Step[facility]

## 2022-10-27 NOTE — ASSESSMENT & PLAN NOTE
S/p I &D at outside hospital  - patient was on Linezolid,  Ceftriaxone added here. Now off abx

## 2022-10-27 NOTE — HOSPITAL COURSE
Patient with zero family in [place], majority in [place]. Social support and limited sobriety precluding him from transplant evaluation here. Patient's family organizing transportation for him to be in [place] with them at home. He and family are aware that his renal function continues to decline and that he likely will require dialysis soon, but they want to either pursue hospice care or HD when he arrives at his sister's house and they discuss it further. Furthermore, he must be a resident of [place] to be eligible for [place] medicaid which his sister has been working on arranging. Patient otherwise remains hemodynamically stable but is still very ill with MELD of 36. Patient and sister aware that given the tenuous nature of his diagnosis there is a chance that he may not survive the transportation to [place] and patient states that regardless he would like to be discharged to be with family and if he passes away en route, he is willing to accept that possibility.

## 2022-10-28 PROBLEM — I95.9 HYPOTENSION: Status: ACTIVE | Noted: 2021-10-20

## 2022-10-28 LAB
ALBUMIN SERPL BCP-MCNC: 2.8 G/DL (ref 3.5–5.2)
ALP SERPL-CCNC: 155 U/L (ref 55–135)
ALT SERPL W/O P-5'-P-CCNC: 20 U/L (ref 10–44)
ANION GAP SERPL CALC-SCNC: 16 MMOL/L (ref 8–16)
ANISOCYTOSIS BLD QL SMEAR: SLIGHT
AST SERPL-CCNC: 41 U/L (ref 10–40)
BASOPHILS # BLD AUTO: 0.12 K/UL (ref 0–0.2)
BASOPHILS NFR BLD: 0.5 % (ref 0–1.9)
BILIRUB SERPL-MCNC: 13.3 MG/DL (ref 0.1–1)
BUN SERPL-MCNC: 62 MG/DL (ref 6–20)
CALCIUM SERPL-MCNC: 8.4 MG/DL (ref 8.7–10.5)
CHLORIDE SERPL-SCNC: 97 MMOL/L (ref 95–110)
CO2 SERPL-SCNC: 15 MMOL/L (ref 23–29)
CREAT SERPL-MCNC: 5 MG/DL (ref 0.5–1.4)
DACRYOCYTES BLD QL SMEAR: ABNORMAL
DIFFERENTIAL METHOD: ABNORMAL
EOSINOPHIL # BLD AUTO: 1 K/UL (ref 0–0.5)
EOSINOPHIL NFR BLD: 4.5 % (ref 0–8)
ERYTHROCYTE [DISTWIDTH] IN BLOOD BY AUTOMATED COUNT: 18.7 % (ref 11.5–14.5)
EST. GFR  (NO RACE VARIABLE): 13.4 ML/MIN/1.73 M^2
GLUCOSE SERPL-MCNC: 147 MG/DL (ref 70–110)
HCT VFR BLD AUTO: 25.1 % (ref 40–54)
HGB BLD-MCNC: 8.3 G/DL (ref 14–18)
HYPOCHROMIA BLD QL SMEAR: ABNORMAL
IMM GRANULOCYTES # BLD AUTO: 0.12 K/UL (ref 0–0.04)
IMM GRANULOCYTES NFR BLD AUTO: 0.5 % (ref 0–0.5)
INR PPP: 1.9 (ref 0.8–1.2)
LYMPHOCYTES # BLD AUTO: 3.1 K/UL (ref 1–4.8)
LYMPHOCYTES NFR BLD: 13.7 % (ref 18–48)
MCH RBC QN AUTO: 34.6 PG (ref 27–31)
MCHC RBC AUTO-ENTMCNC: 33.1 G/DL (ref 32–36)
MCV RBC AUTO: 105 FL (ref 82–98)
MONOCYTES # BLD AUTO: 2.8 K/UL (ref 0.3–1)
MONOCYTES NFR BLD: 12.4 % (ref 4–15)
NEUTROPHILS # BLD AUTO: 15.3 K/UL (ref 1.8–7.7)
NEUTROPHILS NFR BLD: 68.4 % (ref 38–73)
NRBC BLD-RTO: 0 /100 WBC
OVALOCYTES BLD QL SMEAR: ABNORMAL
PLATELET # BLD AUTO: 90 K/UL (ref 150–450)
PLATELET BLD QL SMEAR: ABNORMAL
PMV BLD AUTO: 12.9 FL (ref 9.2–12.9)
POIKILOCYTOSIS BLD QL SMEAR: SLIGHT
POTASSIUM SERPL-SCNC: 3.7 MMOL/L (ref 3.5–5.1)
PROT SERPL-MCNC: 5.4 G/DL (ref 6–8.4)
PROTHROMBIN TIME: 19.3 SEC (ref 9–12.5)
RBC # BLD AUTO: 2.4 M/UL (ref 4.6–6.2)
SODIUM SERPL-SCNC: 128 MMOL/L (ref 136–145)
WBC # BLD AUTO: 22.33 K/UL (ref 3.9–12.7)

## 2022-10-28 PROCEDURE — 97530 THERAPEUTIC ACTIVITIES: CPT

## 2022-10-28 PROCEDURE — P9047 ALBUMIN (HUMAN), 25%, 50ML: HCPCS | Mod: JG | Performed by: STUDENT IN AN ORGANIZED HEALTH CARE EDUCATION/TRAINING PROGRAM

## 2022-10-28 PROCEDURE — 85610 PROTHROMBIN TIME: CPT | Performed by: HOSPITALIST

## 2022-10-28 PROCEDURE — 36415 COLL VENOUS BLD VENIPUNCTURE: CPT | Performed by: HOSPITALIST

## 2022-10-28 PROCEDURE — 63600175 PHARM REV CODE 636 W HCPCS: Mod: JB | Performed by: HOSPITALIST

## 2022-10-28 PROCEDURE — 97535 SELF CARE MNGMENT TRAINING: CPT

## 2022-10-28 PROCEDURE — 99233 PR SUBSEQUENT HOSPITAL CARE,LEVL III: ICD-10-PCS | Mod: ,,, | Performed by: STUDENT IN AN ORGANIZED HEALTH CARE EDUCATION/TRAINING PROGRAM

## 2022-10-28 PROCEDURE — 25000003 PHARM REV CODE 250: Performed by: STUDENT IN AN ORGANIZED HEALTH CARE EDUCATION/TRAINING PROGRAM

## 2022-10-28 PROCEDURE — 80053 COMPREHEN METABOLIC PANEL: CPT | Performed by: HOSPITALIST

## 2022-10-28 PROCEDURE — 25000003 PHARM REV CODE 250: Performed by: HOSPITALIST

## 2022-10-28 PROCEDURE — 99233 SBSQ HOSP IP/OBS HIGH 50: CPT | Mod: ,,, | Performed by: STUDENT IN AN ORGANIZED HEALTH CARE EDUCATION/TRAINING PROGRAM

## 2022-10-28 PROCEDURE — 20600001 HC STEP DOWN PRIVATE ROOM

## 2022-10-28 PROCEDURE — 63600175 PHARM REV CODE 636 W HCPCS: Mod: JG | Performed by: STUDENT IN AN ORGANIZED HEALTH CARE EDUCATION/TRAINING PROGRAM

## 2022-10-28 PROCEDURE — 85025 COMPLETE CBC W/AUTO DIFF WBC: CPT | Performed by: HOSPITALIST

## 2022-10-28 RX ORDER — MIDODRINE HYDROCHLORIDE 5 MG/1
15 TABLET ORAL EVERY 8 HOURS
Status: DISCONTINUED | OUTPATIENT
Start: 2022-10-28 | End: 2022-11-01 | Stop reason: HOSPADM

## 2022-10-28 RX ORDER — ALBUMIN HUMAN 250 G/1000ML
25 SOLUTION INTRAVENOUS EVERY 12 HOURS
Status: COMPLETED | OUTPATIENT
Start: 2022-10-28 | End: 2022-10-28

## 2022-10-28 RX ADMIN — ALBUMIN (HUMAN) 25 G: 12.5 SOLUTION INTRAVENOUS at 08:10

## 2022-10-28 RX ADMIN — SODIUM BICARBONATE 650 MG TABLET 1300 MG: at 02:10

## 2022-10-28 RX ADMIN — LACTULOSE 10 G: 20 SOLUTION ORAL at 08:10

## 2022-10-28 RX ADMIN — MIDODRINE HYDROCHLORIDE 15 MG: 5 TABLET ORAL at 02:10

## 2022-10-28 RX ADMIN — Medication 1 CAPSULE: at 08:10

## 2022-10-28 RX ADMIN — OCTREOTIDE ACETATE 100 MCG: 100 INJECTION, SOLUTION INTRAVENOUS; SUBCUTANEOUS at 06:10

## 2022-10-28 RX ADMIN — PREGABALIN 75 MG: 75 CAPSULE ORAL at 08:10

## 2022-10-28 RX ADMIN — OCTREOTIDE ACETATE 100 MCG: 100 INJECTION, SOLUTION INTRAVENOUS; SUBCUTANEOUS at 02:10

## 2022-10-28 RX ADMIN — FOLIC ACID 1000 MCG: 1 TABLET ORAL at 08:10

## 2022-10-28 RX ADMIN — SODIUM BICARBONATE 650 MG TABLET 1300 MG: at 08:10

## 2022-10-28 RX ADMIN — OCTREOTIDE ACETATE 100 MCG: 100 INJECTION, SOLUTION INTRAVENOUS; SUBCUTANEOUS at 08:10

## 2022-10-28 RX ADMIN — Medication 100 MG: at 08:10

## 2022-10-28 RX ADMIN — MIDODRINE HYDROCHLORIDE 10 MG: 5 TABLET ORAL at 06:10

## 2022-10-28 RX ADMIN — MIDODRINE HYDROCHLORIDE 15 MG: 5 TABLET ORAL at 08:10

## 2022-10-28 NOTE — ASSESSMENT & PLAN NOTE
MELD-Na score: 38 at 10/28/2022  6:21 AM  MELD score: 37 at 10/28/2022  6:21 AM  Calculated from:  Serum Creatinine: 5.0 mg/dL (Using max of 4 mg/dL) at 10/28/2022  6:21 AM  Serum Sodium: 128 mmol/L at 10/28/2022  6:21 AM  Total Bilirubin: 13.3 mg/dL at 10/28/2022  6:21 AM  INR(ratio): 1.9 at 10/28/2022  6:20 AM  Age: 47 years  Not a transplant evaluation candidate given limited sobriety and lack of family in [place]. Patient would like to transfer to [place] [facility] however he realizes the barriers before him - lack of [place] medicaid and [place] medicaid unlikely paying for transfer or transplant in [place]  - Continuing albumin, midodrine and octreotide for HRS  - Continuing lactulose and rifaximin for HE  - s/p Vitamin K x3 days  - Para on admit negative for SBP

## 2022-10-28 NOTE — ASSESSMENT & PLAN NOTE
Worsening  Patient with acute kidney injury likely due to HRS vs bile cast nephropathy vs ATN KATHE is currently worsening. Labs reviewed- Renal function/electrolytes with Estimated Creatinine Clearance: 21.2 mL/min (A) (based on SCr of 5 mg/dL (H)). according to latest data. Monitor urine output and serial BMP and adjust therapy as needed. Avoid nephrotoxins and renally dose meds for GFR listed above.   - Nephrology following  - Appears to be consistent with hepatorenal syndrome  - Worsening GFR with metabolic acidosis  - Cont midodrine, octreotide, did not improve with albumin

## 2022-10-28 NOTE — PROGRESS NOTES
AAOx4. VSS. Midodrine increased to 15mg TID. Cr 5.0 (increased from 3.8 prior) - nephrology following - states patient may eventually need RRT if kidney function continues to worsen. Albumin 2.8 on AM labs - albumin given IV. Patient worked w/ PT today - ambulated in room w/ walker and sat up in chair. Wound care done to R axillary per wound care nurse - dressing is CDI. Patient up w/ 2x assist to bathroom/BSC. Patient w/ minimal UOP throughout shift - team aware. Patient had 1 BM so far this shift. Bed in low position. Non-skid socks on. Call light within reach.

## 2022-10-28 NOTE — PT/OT/SLP PROGRESS
Occupational Therapy   Treatment    Name: Marshall Corona  MRN: [id number]  Admitting Diagnosis:  KATHE (acute kidney injury)       Recommendations:     Discharge Recommendations: rehabilitation facility  Discharge Equipment Recommendations:  bedside commode  Barriers to discharge:  None    Assessment:     Marshall Corona is a 47 y.o. male with a medical diagnosis of KATHE (acute kidney injury).  He presents with decreased motivation and lethargy. Pt performed bed mobility with SBA  and bed rail (A). Once EOB pt maintained static unsupported sitting for 5 minutes without LOB. Pt performed grooming at EOB with set up (A). Sit <> stand x 2 trials with CGA x 1 using RW. Pt ambulated 5 feet to chair using RW. On second visit, pt required Max (A) x 1 for sit <> stand / Max (A) for stand pivot. Performance deficits affecting function are weakness, impaired endurance, impaired self care skills, impaired functional mobility, gait instability, impaired balance, decreased upper extremity function, decreased lower extremity function, pain, impaired cardiopulmonary response to activity.     Rehab Prognosis:  Good; patient would benefit from acute skilled OT services to address these deficits and reach maximum level of function.       Plan:     Patient to be seen 3 x/week to address the above listed problems via self-care/home management, neuromuscular re-education, therapeutic activities, therapeutic exercises  Plan of Care Expires: 11/30/22  Plan of Care Reviewed with: patient    Subjective     Pain/Comfort:  Pain Rating 1: 6/10  Location - Side 1: Bilateral  Location - Orientation 1: generalized  Location 1: abdomen  Pain Addressed 1: Reposition, Distraction    Objective:     Communicated with: Nurse prior to session.  Patient found HOB elevated with bed alarm, peripheral IV upon OT entry to room.    General Precautions: Standard, fall   Orthopedic Precautions:N/A   Braces: N/A  Respiratory Status: Room air     Occupational  Performance:     Bed Mobility:    Patient completed Rolling/Turning to Left with  stand by assistance  Patient completed Rolling/Turning to Right with stand by assistance  Patient completed Scooting/Bridging with stand by assistance  Patient completed Supine to Sit with stand by assistance  Patient completed Sit to Supine with stand by assistance     Functional Mobility/Transfers:  Patient completed Sit <> Stand Transfer with contact guard assistance  with  rolling walker   Patient completed Bed <> Chair Transfer using Step Transfer technique with minimum assistance with rolling walker  Functional Mobility: Pt ambulated 5 feet with decreased foot clearance / fearful      Activities of Daily Living:  Grooming: supervision Pt brushed teeth EOB  Lower Body Dressing: supervision Pt donned socks supine      [facility] 6 Click ADL: 18    Treatment & Education:  Pt educated on role of OT/POC  Pt. Educated on safety precautions   Pt provided self-care/ADL re-education  Pt reviewed bed mobility/functional mobility  Pt completed 1 sets of 10 reps of B UE AROM exercise program sitting unsupported EOB in order to work towards increasing UB strength/endurance and to maximize safety and (I) with mobility and self-care skills.  Pt completed the following exercises with initial demonstration from therapist: shld flexion/extension, elbow flexion/extension, chest press. Pt instructed to perform B UEs daily in order to improve B UE function and maintain joint integrity.      Patient left up in chair with all lines intact, call button in reach, and nurse notified. Pt was seen second time to return to bed.     GOALS:   Multidisciplinary Problems       Occupational Therapy Goals          Problem: Occupational Therapy    Goal Priority Disciplines Outcome Interventions   Occupational Therapy Goal     OT, PT/OT Ongoing, Progressing    Description: Goals to be met by: 10/24/22     Patient will increase functional independence with ADLs by  performing:    UE Dressing with Modified [place].  LE Dressing with Modified [place].  Grooming while standing at sink with Modified [place].  Toileting from toilet with Modified [place] for hygiene and clothing management.   Toilet transfer to toilet with Modified [place].                         Time Tracking:     OT Date of Treatment: 10/28/22  OT Start Time: 0830  OT Stop Time: 0853  2nd OT Start Time: 1041  2nd OT Stop Time:1057  OT Total Time (min): 39 min    Billable Minutes:Self Care/Home Management 28  Therapeutic Activity 11    OT/TOM: OT     TOM Visit Number: 0    10/28/2022

## 2022-10-28 NOTE — PROGRESS NOTES
[facility] - Transplant Stepdown  Adult Nutrition  Progress Note    SUMMARY       Recommendations    1. Rec Renal diet      2. Add Novasource renal BID to optimize nutrient intake     3. RD to monitor and follow    Goals: Meet % EEN, EPN by RD f/u  Nutrition Goal Status: progressing towards goal  Communication of RD Recs:  (POC)    Assessment and Plan    Nutrition Problem  Increased nutrient needs (protein, energy)     Related to (etiology):   Increased metabolic demands     Signs and Symptoms (as evidenced by):   Liver cirrhosis     Interventions/Recommendations (treatment strategy):  Collaboration with other providers  Nutrition education     Nutrition Diagnosis Status:   Continue     Reason for Assessment    Reason For Assessment: RD follow-up  Diagnosis:  (KATHE)  Relevant Medical History: HTN, cirrhosis  Interdisciplinary Rounds: did not attend    General Information Comments:   10/28: Pt reports decreased appetite. Does not like hospital food. Friend and family were bringing him food from outside. Pt reports he is eating small meals 3 times per day. Denies N/V, chewing/swallowing difficulties. Agreeable to ONS. Discussed food preferences and made note on [employer]. Pt with 2+ edema to BLE per chart. Noted wt loss of 23 lb x 2 days, likely 2/2 fluid.     10/24: RD consulted for liver transplant evaluation. Pt reports good appetite PTA. Usually eat 1-2 full meals per day. Tolerating 75% of meals per chart. Denies N/V, chewing/swallowing difficulties. Stated  lb. Per wt hx, noted wt loss 16 lb (7%) x 7 months from March to Oct (not significant). Wt gained of 9 lb x 9 days - likely 2/2 fluid. 2+ dependent edema per chart. NFPE completed today, pt appears nourished. Does not meet criteria for malnutrition at this time.     Nutrition Discharge Planningm Na education provided on 10/24. Discussed foods to limit or avoid and benefits of diet adherence. Post transplant expectations/guidelines also  introduced. Appropriate education material, RD contact information provided. No other needs identified.    Nutrition Risk Screen    Nutrition Risk Screen: no indicators present    Nutrition/Diet History    Spiritual, Cultural Beliefs, Mormon Practices, Values that Affect Care: no    Anthropometrics    Temp: 98.3 °F (36.8 °C)  Height: 6' (182.9 cm)  Height (inches): 72 in  Weight Method: Bed Scale  Weight: 93.3 kg (205 lb 11 oz)  Weight (lb): 205.69 lb  Ideal Body Weight (IBW), Male: 178 lb  % Ideal Body Weight, Male (lb): 128.09 %  BMI (Calculated): 27.9     Lab/Procedures/Meds    Pertinent Labs Reviewed: reviewed  Pertinent Labs Comments: Na 128, glucose 147, BUN 62, Cr 5.0, , AST 41, eGFR 13.4  Pertinent Medications Reviewed: reviewed  Pertinent Medications Comments: albumin, folic acid, Lactobacillus, thiamine, lactulose, Na bicarb    Estimated/Assessed Needs    Weight Used For Calorie Calculations: 93 kg (205 lb)  Energy Calorie Requirements (kcal): 2198 kcal  Energy Need Method: [place]-St Jeor (PAL 1.2)  Protein Requirements: 80g (1g/kg IBW)  Weight Used For Protein Calculations: 80.7 kg (178 lb) (IBW)  Fluid Requirements (mL): per MD  RDA Method (mL): 2198     Nutrition Prescription Ordered    Current Diet Order: Low Na    Evaluation of Received Nutrient/Fluid Intake    I/O: - 3.7L since admit  Energy Calories Required: not meeting needs  Protein Required: not meeting needs  Comments: LBM 10/28  Tolerance: tolerating    Nutrition Risk    Level of Risk/Frequency of Follow-up: low     Monitor and Evaluation    Food and Nutrient Intake: food and beverage intake, energy intake  Food and Nutrient Adminstration: diet order  Knowledge/Beliefs/Attitudes: food and nutrition knowledge/skill  Physical Activity and Function: nutrition-related ADLs and IADLs  Anthropometric Measurements: height/length, weight, weight change, body mass index  Biochemical Data, Medical Tests and Procedures: electrolyte and renal  panel, gastrointestinal profile, glucose/endocrine profile, inflammatory profile, lipid profile  Nutrition-Focused Physical Findings: overall appearance     Nutrition Follow-Up    RD Follow-up?: Yes    Js HERNANDEZN

## 2022-10-28 NOTE — PT/OT/SLP PROGRESS
Physical Therapy  Continue Current Plan of Care     Patient Name:  Marshall Corona   MRN:  [id number]  Admitting Diagnosis:  KATHE (acute kidney injury)   Recent Surgery: * No surgery found *    Admit Date: 10/22/2022  Length of Stay: 6 days    Patient not seen on this date, however per chart review pt continues to benefit from acute PT services. PT to follow up as POC allows. Please continue progressive mobility as appropriate.    Discharge Disposition Recommendation: Rehab    MARLY Marie  10/28/2022  Pager: [phone]

## 2022-10-28 NOTE — ASSESSMENT & PLAN NOTE
Secondary to decompensated liver disease that is not improving.   - On midodrine 15mg TID  - No obvious source of ongoing infection, previously treated for abscess of axilla.   - If decompensates further, he is wanting to escalate care to ICU if needed.

## 2022-10-28 NOTE — PLAN OF CARE
Problem: Occupational Therapy  Goal: Occupational Therapy Goal  Description: Goals to be met by: 10/24/22     Patient will increase functional independence with ADLs by performing:    UE Dressing with Modified [place].  LE Dressing with Modified [place].  Grooming while standing at sink with Modified [place].  Toileting from toilet with Modified [place] for hygiene and clothing management.   Toilet transfer to toilet with Modified [place].    Outcome: Ongoing, Progressing

## 2022-10-28 NOTE — PROGRESS NOTES
"[facility] - Transplant Stepdown  Nephrology  Progress Note    Patient Name: Marshall Corona  MRN: [id number]  Admission Date: 10/22/2022  Hospital Length of Stay: 6 days  Attending Provider: Amando Knight MD   Primary Care Physician: Primary Doctor No  Principal Problem:KATHE (acute kidney injury)    Subjective:     HPI: Marshall Corona is a 47-year-old male with hypertension, diabetes, alcoholic cirrhosis who presented to OSH with weakness and abdominal distention. Patient was subsequently transferred to [facility] for liver transplant evaluation.  During hospitalization at OSH patient noted to have right axillary abscess which was drained and he was subsequently started on clindamycin and transitioned to linezolid.  Additionally patient with worsening renal function during admission with creatinine of 1.4 on admit with subsequent worsening up to 3.8 at the time of transfer.  Outside records noting worsening renal function following paracentesis and associated hypotension for which he received albumin and was started on midodrine.  Nephrology was consulted for the management of KATHE.      Interval History: NAEON. Worsening abdominal distension today and decreased UOP. Was denied workup for liver tx and is planning on going to [place] for another workup. Kidney function continues to worsen. Will monitor closely for RRT needs.     Review of patient's allergies indicates:   Allergen Reactions    Codeine Itching, Anaphylaxis and Hives    Hydrocodone-acetaminophen      "Agitation"    Influenza virus vaccines Other (See Comments)    Oxycodone-acetaminophen      "Agitation"     Current Facility-Administered Medications   Medication Frequency    acetaminophen tablet 650 mg Q8H PRN    albumin human 25% bottle 25 g Q12H    dextrose 10% bolus 125 mL PRN    dextrose 10% bolus 250 mL PRN    folic acid tablet 1,000 mcg Daily    Lactobacillus rhamnosus GG capsule 1 capsule Daily    lactulose 20 gram/30 mL solution Soln 10 g BID "    miconazole NITRATE 2 % top powder BID    miconazole nitrate 2% ointment PRN    midodrine tablet 15 mg Q8H    octreotide injection 100 mcg Q8H    ondansetron injection 4 mg Q6H PRN    pregabalin capsule 75 mg BID    prochlorperazine injection Soln 5 mg Q6H PRN    sodium bicarbonate tablet 1,300 mg TID    thiamine tablet 100 mg Daily       Objective:     Vital Signs (Most Recent):  Temp: 98.3 °F (36.8 °C) (10/28/22 1131)  Pulse: 99 (10/28/22 1131)  Resp: 18 (10/28/22 0718)  BP: (!) 86/54 (10/28/22 1158)  SpO2: (!) 94 % (10/28/22 1131)  O2 Device (Oxygen Therapy): room air (10/28/22 0718) Vital Signs (24h Range):  Temp:  [97.8 °F (36.6 °C)-98.3 °F (36.8 °C)] 98.3 °F (36.8 °C)  Pulse:  [] 99  Resp:  [18] 18  SpO2:  [94 %-98 %] 94 %  BP: ()/(37-62) 86/54     Weight: 93.3 kg (205 lb 11 oz) (10/26/22 0536)  Body mass index is 27.9 kg/m².  Body surface area is 2.18 meters squared.    I/O last 3 completed shifts:  In: 600 [P.O.:600]  Out: -     Physical Exam  Constitutional:       Appearance: Normal appearance.   Eyes:      General: Scleral icterus present.      Conjunctiva/sclera: Conjunctivae normal.   Cardiovascular:      Rate and Rhythm: Normal rate and regular rhythm.      Pulses: Normal pulses.      Heart sounds: Normal heart sounds.   Pulmonary:      Effort: Pulmonary effort is normal. No respiratory distress.      Breath sounds: Normal breath sounds.   Abdominal:      General: Bowel sounds are normal. There is distension (worse today).      Palpations: Abdomen is soft.      Tenderness: There is no abdominal tenderness.   Musculoskeletal:      Right lower leg: No edema.      Left lower leg: No edema.   Skin:     General: Skin is warm and dry.      Coloration: Skin is jaundiced.      Findings: No bruising.   Neurological:      Mental Status: He is alert and oriented to person, place, and time.       Significant Labs:  CBC:   Recent Labs   Lab 10/28/22  0620   WBC 22.33*   RBC 2.40*   HGB 8.3*    HCT 25.1*   PLT 90*   *   MCH 34.6*   MCHC 33.1       CMP:   Recent Labs   Lab 10/28/22  0621   *   CALCIUM 8.4*   ALBUMIN 2.8*   PROT 5.4*   *   K 3.7   CO2 15*   CL 97   BUN 62*   CREATININE 5.0*   ALKPHOS 155*   ALT 20   AST 41*   BILITOT 13.3*       All labs within the past 24 hours have been reviewed.     Significant Imaging:       Assessment/Plan:     * KATHE (acute kidney injury)  Patient with baseline creatinine of 1.0 which worsened to creatinine of 1.4 at the time of admission to OSH and subsequently 3.8 upon transfer to [facility].  Notably, creatinine 2.4 upon repeat so suspect 3.8 was erroneous. In the setting of cirrhosis, there was some concern for hepatorenal syndrome so patient was started on albumin and midodrine.  Urine sodium (<10) consistent with hepatorenal syndrome but patient with robust blood pressures so less likely.  Lower suspicion for abdominal compartment syndrome given recent paracentesis.  Bilirubin elevated.  Suspect either HRS versus ATN v bilirubin tubulopathy.     Cr continues to increase, 5.0 on 10/28. UOP has declined as well. Patient with episodes of hypotension overnight. Anticipate he will need RRT in the near future. Will continue to monitor closely.      Recommendations:   - no urgent indication for RRT at this time   - okay to continue HRS regimen at this time  - s/p albumin protocol for volume expansion  - strict intake and output  - maintain map > 85 if treating for HRS  - renally dose medications and avoid nephrotoxins when possible          Thank you for your consult. I will follow-up with patient. Please contact us if you have any additional questions.    Duy Hdz MD  Nephrology  [facility] - Transplant Stepdown

## 2022-10-28 NOTE — SUBJECTIVE & OBJECTIVE
"Interval History: NAEON. Worsening abdominal distension today and decreased UOP. Was denied workup for liver tx and is planning on going to [place] for another workup. Kidney function continues to worsen. Will monitor closely for RRT needs.     Review of patient's allergies indicates:   Allergen Reactions    Codeine Itching, Anaphylaxis and Hives    Hydrocodone-acetaminophen      "Agitation"    Influenza virus vaccines Other (See Comments)    Oxycodone-acetaminophen      "Agitation"     Current Facility-Administered Medications   Medication Frequency    acetaminophen tablet 650 mg Q8H PRN    albumin human 25% bottle 25 g Q12H    dextrose 10% bolus 125 mL PRN    dextrose 10% bolus 250 mL PRN    folic acid tablet 1,000 mcg Daily    Lactobacillus rhamnosus GG capsule 1 capsule Daily    lactulose 20 gram/30 mL solution Soln 10 g BID    miconazole NITRATE 2 % top powder BID    miconazole nitrate 2% ointment PRN    midodrine tablet 15 mg Q8H    octreotide injection 100 mcg Q8H    ondansetron injection 4 mg Q6H PRN    pregabalin capsule 75 mg BID    prochlorperazine injection Soln 5 mg Q6H PRN    sodium bicarbonate tablet 1,300 mg TID    thiamine tablet 100 mg Daily       Objective:     Vital Signs (Most Recent):  Temp: 98.3 °F (36.8 °C) (10/28/22 1131)  Pulse: 99 (10/28/22 1131)  Resp: 18 (10/28/22 0718)  BP: (!) 86/54 (10/28/22 1158)  SpO2: (!) 94 % (10/28/22 1131)  O2 Device (Oxygen Therapy): room air (10/28/22 0718) Vital Signs (24h Range):  Temp:  [97.8 °F (36.6 °C)-98.3 °F (36.8 °C)] 98.3 °F (36.8 °C)  Pulse:  [] 99  Resp:  [18] 18  SpO2:  [94 %-98 %] 94 %  BP: ()/(37-62) 86/54     Weight: 93.3 kg (205 lb 11 oz) (10/26/22 0536)  Body mass index is 27.9 kg/m².  Body surface area is 2.18 meters squared.    I/O last 3 completed shifts:  In: 600 [P.O.:600]  Out: -     Physical Exam  Constitutional:       Appearance: Normal appearance.   Eyes:      General: Scleral icterus present.      Conjunctiva/sclera: " Conjunctivae normal.   Cardiovascular:      Rate and Rhythm: Normal rate and regular rhythm.      Pulses: Normal pulses.      Heart sounds: Normal heart sounds.   Pulmonary:      Effort: Pulmonary effort is normal. No respiratory distress.      Breath sounds: Normal breath sounds.   Abdominal:      General: Bowel sounds are normal. There is distension (worse today).      Palpations: Abdomen is soft.      Tenderness: There is no abdominal tenderness.   Musculoskeletal:      Right lower leg: No edema.      Left lower leg: No edema.   Skin:     General: Skin is warm and dry.      Coloration: Skin is jaundiced.      Findings: No bruising.   Neurological:      Mental Status: He is alert and oriented to person, place, and time.       Significant Labs:  CBC:   Recent Labs   Lab 10/28/22  0620   WBC 22.33*   RBC 2.40*   HGB 8.3*   HCT 25.1*   PLT 90*   *   MCH 34.6*   MCHC 33.1       CMP:   Recent Labs   Lab 10/28/22  0621   *   CALCIUM 8.4*   ALBUMIN 2.8*   PROT 5.4*   *   K 3.7   CO2 15*   CL 97   BUN 62*   CREATININE 5.0*   ALKPHOS 155*   ALT 20   AST 41*   BILITOT 13.3*       All labs within the past 24 hours have been reviewed.     Significant Imaging:

## 2022-10-28 NOTE — PROGRESS NOTES
[facility] - Transplant [facility] Medicine  Progress Note    Patient Name: Marshall Corona  MRN: [id number]  Patient Class: IP- Inpatient   Admission Date: 10/22/2022  Length of Stay: 6 days  Attending Physician: Amando Knight MD  Primary Care Provider: Primary Doctor No        Subjective:     Principal Problem:KATHE (acute kidney injury)        HPI:  TRANSFER CENTER  PHYSICIAN SUMMARY  47-year-old m with PMH HTN, GBS, diabetes, presumed alcoholic cirrhosis admitted to [facility] on 10/15 with weakness and abd distension.  Patient reports that his last drink was 2 months ago.     On admission VS unremarkable.  PEx pos for scleral icterus and abd distension with no tenderness.  He was AOX3.  WBC 12.9, Hb 7.2, Plts 142, INR 1.6 > 1. Na 132, K 3.7, CO2 21, BUN 50, Cr 1.41 (BL 0.91), bilirubin 8.8, AST 68, ALT 37, HV A/B/C neg.  COVID neg.       CT abd pos for cirrhosis with moderate volume ascites.  Also, 1.4 x 1 cm nonspecific hypoattenuating lesion in near the splenic hilum.  US abdomen pos for cirrhotic liver.  Stasis flow suggestive of portal HTN.  Moderate ascites.       On 10/18 the patient was noted to have a right axillary abscess a/w rising WBC.  The abscess was drained and patient placed on clindamycin > linezolid and CTX.  Cultures NGTD.     Hospitalization a/w increasing renal failure with  Cr 1.41 (10/15) > 1.66 (10/19) > 2 (10/21) > 3.8 (10/22) following a large volume paracentesis on 10/21.  BP fell into the mid 90s briefly following the procedure. Patient was given albumin (25 g x 4 ) and placed on midodrine     Also hosppitalization a/w increasing liver failure: T bili 8.8 > > 10.2, AST 68 > > 88. MELD Na 35     Transfer requested for liver XPL evaluation.  Transfer diagnosis acute liver failure, acute renal failure with concern for hepatorenal syndrome.     BEDSIDE EVAL    At bedside patient reports he feels ok.  Before paracentesis on 10/21, he's had one prior at a another hospital earlier this  month where 4L was removed.  He feels after recent paracentesis he has increased abdominal swelling and pain, feels fluid has reaccumulated faster or he is more bloated.  Reports last alcohol use was a few months ago, states he quit prior to diagnosis of liver disease.     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   \  He uses smokeless tobacco Diamond Dip.  He denies any hx of Intra-nasal or injection illicit drug use.       Overview/Hospital Course:  Patient with zero family in [place], majority in [place]. Social support and limited sobriety precluding him from transplant evaluation here. Patient's family requesting transfer to [facility] at this time. They are in the process of procuring [place] medicaid. Patient otherwise remains stable but is still very ill with MELD of 36      Interval History: No acute events overnight. Patient however more tired this morning and noted to be more hypotensive. He states that he feels more drained. Mentioned increasing midodrine to 15mg TID and that if he requires further medicines, he'd likely need IV pressors to keep his MAP >65. He still would like to escalate care if need be    Review of Systems   Constitutional:  Negative for chills and fever.   HENT:  Negative for congestion and sore throat.    Eyes:  Negative for photophobia and visual disturbance.   Respiratory:  Negative for cough and shortness of breath.    Cardiovascular:  Negative for chest pain and palpitations.   Gastrointestinal:  Positive for abdominal distention. Negative for abdominal pain, blood in stool, constipation, diarrhea, nausea and vomiting.   Endocrine: Negative for cold intolerance and heat intolerance.   Genitourinary:   Negative for difficulty urinating, dysuria and hematuria.   Musculoskeletal:  Negative for arthralgias and myalgias.   Skin:  Positive for color change (jaundice). Negative for rash and wound.   Allergic/Immunologic: Negative for environmental allergies and food allergies.   Neurological:  Negative for seizures and numbness.   Hematological:  Negative for adenopathy. Does not bruise/bleed easily.   Psychiatric/Behavioral:  Negative for hallucinations and suicidal ideas.    Objective:     Vital Signs (Most Recent):  Temp: 98.3 °F (36.8 °C) (10/28/22 1131)  Pulse: 99 (10/28/22 1131)  Resp: 18 (10/28/22 0718)  BP: (!) 86/54 (10/28/22 1158)  SpO2: (!) 94 % (10/28/22 1131) Vital Signs (24h Range):  Temp:  [97.8 °F (36.6 °C)-98.3 °F (36.8 °C)] 98.3 °F (36.8 °C)  Pulse:  [] 99  Resp:  [18] 18  SpO2:  [94 %-98 %] 94 %  BP: ()/(37-62) 86/54     Weight: 93.3 kg (205 lb 11 oz)  Body mass index is 27.9 kg/m².    Intake/Output Summary (Last 24 hours) at 10/28/2022 1625  Last data filed at 10/28/2022 0915  Gross per 24 hour   Intake 240 ml   Output --   Net 240 ml        Physical Exam  Constitutional:       Appearance: He is well-developed. He is ill-appearing. He is not toxic-appearing.   HENT:      Head: Normocephalic and atraumatic.   Eyes:      General: Scleral icterus present.      Conjunctiva/sclera: Conjunctivae normal.      Pupils: Pupils are equal, round, and reactive to light.   Neck:      Thyroid: No thyromegaly.      Vascular: No JVD.   Cardiovascular:      Rate and Rhythm: Normal rate and regular rhythm.      Heart sounds: Normal heart sounds. No murmur heard.    No friction rub. No gallop.   Pulmonary:      Effort: Pulmonary effort is normal. No respiratory distress.      Breath sounds: Normal breath sounds. No wheezing or rales.   Abdominal:      General: Bowel sounds are normal. There is distension.      Palpations: Abdomen is soft. There is no mass.      Tenderness: There is no abdominal tenderness.  There is no guarding or rebound.      Hernia: No hernia is present.   Musculoskeletal:         General: No deformity. Normal range of motion.      Cervical back: Normal range of motion and neck supple.   Skin:     General: Skin is warm and dry.      Coloration: Skin is jaundiced.   Neurological:      Mental Status: He is alert and oriented to person, place, and time.      Cranial Nerves: No cranial nerve deficit.   Psychiatric:         Behavior: Behavior normal.       Significant Labs: All pertinent labs within the past 24 hours have been reviewed.  CBC:   Recent Labs   Lab 10/27/22  0518 10/28/22  0620   WBC 22.53* 22.33*   HGB 8.4* 8.3*   HCT 25.3* 25.1*   * 90*       CMP:   Recent Labs   Lab 10/27/22  0518 10/28/22  0621   * 128*   K 4.1 3.7   CL 99 97   CO2 20* 15*   * 147*   BUN 58* 62*   CREATININE 3.8* 5.0*   CALCIUM 8.4* 8.4*   PROT 5.5* 5.4*   ALBUMIN 3.1* 2.8*   BILITOT 11.9* 13.3*   ALKPHOS 149* 155*   AST 42* 41*   ALT 22 20   ANIONGAP 10 16         Significant Imaging: I have reviewed all pertinent imaging results/findings within the past 24 hours.      Assessment/Plan:      * KATHE (acute kidney injury)  Worsening  Patient with acute kidney injury likely due to HRS vs bile cast nephropathy vs ATN KATHE is currently worsening. Labs reviewed- Renal function/electrolytes with Estimated Creatinine Clearance: 21.2 mL/min (A) (based on SCr of 5 mg/dL (H)). according to latest data. Monitor urine output and serial BMP and adjust therapy as needed. Avoid nephrotoxins and renally dose meds for GFR listed above.   - Nephrology following  - Appears to be consistent with hepatorenal syndrome  - Worsening GFR with metabolic acidosis  - Cont midodrine, octreotide, did not improve with albumin      Hypotension  Secondary to decompensated liver disease that is not improving.   - On midodrine 15mg TID  - No obvious source of ongoing infection, previously treated for abscess of axilla.   - If  decompensates further, he is wanting to escalate care to ICU if needed.       Alcoholic cirrhosis  MELD-Na score: 38 at 10/28/2022  6:21 AM  MELD score: 37 at 10/28/2022  6:21 AM  Calculated from:  Serum Creatinine: 5.0 mg/dL (Using max of 4 mg/dL) at 10/28/2022  6:21 AM  Serum Sodium: 128 mmol/L at 10/28/2022  6:21 AM  Total Bilirubin: 13.3 mg/dL at 10/28/2022  6:21 AM  INR(ratio): 1.9 at 10/28/2022  6:20 AM  Age: 47 years  Not a transplant evaluation candidate given limited sobriety and lack of family in [place]. Patient would like to transfer to [facility] however he realizes the barriers before him - lack of [place] medicaid and [place] medicaid unlikely paying for transfer or transplant in [place]  - Continuing albumin, midodrine and octreotide for HRS  - Continuing lactulose and rifaximin for HE  - s/p Vitamin K x3 days  - Para on admit negative for SBP      Decompensated hepatic cirrhosis  See alcoholic cirrhosis      Alcohol dependence in remission  - PETH negative  - reported last drink was few months ago  -continue MVI & Folic acid, add thiamine supplementation      Hyponatremia  Stable.  Secondary to underlying cirrhosis   - holding diuretics  - Oral fluid restriction         Abscess of right arm  S/p I &D at outside hospital  - patient was on Linezolid,  Ceftriaxone added here. Now off abx        Debility  Hx of Guillain [place] syndrome - reports not fully recovered, left with chronic debility   -PT/OT consults.       Symptomatic anemia  Resolved, Required PRBC transfusion prior to transfer.   -trend daily CBC  -no active GI bleeding.         VTE Risk Mitigation (From admission, onward)         Ordered     Place sequential compression device  Until discontinued         10/22/22 2124                Discharge Planning   YARITZA: 10/29/2022     Code Status: Full Code   Is the patient medically ready for discharge?: No    Reason for patient still in hospital (select all that apply):  Patient trending condition  Discharge Plan A: Home with family                  Amando Knight MD  Department of Hospital Medicine   [facility] - Transplant Stepdown

## 2022-10-28 NOTE — PLAN OF CARE
Pt AOX4, VSS, afebrile.   No c/o pain/N/V.   PERLA axillae dressing CDI- wound care MWF  PT/OT working with patient  Pt up to bedside commode with +2 assist and walker  Lactulose continued  Fall and infection precautions maintained throughout shift  Pt in lowest settings, call light w/in reach, nonskid socks when ambulating, remains free from falls. NAD. WCTM.

## 2022-10-28 NOTE — ASSESSMENT & PLAN NOTE
Patient with baseline creatinine of 1.0 which worsened to creatinine of 1.4 at the time of admission to OSH and subsequently 3.8 upon transfer to [facility].  Notably, creatinine 2.4 upon repeat so suspect 3.8 was erroneous. In the setting of cirrhosis, there was some concern for hepatorenal syndrome so patient was started on albumin and midodrine.  Urine sodium (<10) consistent with hepatorenal syndrome but patient with robust blood pressures so less likely.  Lower suspicion for abdominal compartment syndrome given recent paracentesis.  Bilirubin elevated.  Suspect either HRS versus ATN v bilirubin tubulopathy.     Cr continues to increase, 5.0 on 10/28. UOP has declined as well. Patient with episodes of hypotension overnight. Anticipate he will need RRT in the near future. Will continue to monitor closely.      Recommendations:   - no urgent indication for RRT at this time   - okay to continue HRS regimen at this time  - s/p albumin protocol for volume expansion  - strict intake and output  - maintain map > 85 if treating for HRS  - renally dose medications and avoid nephrotoxins when possible

## 2022-10-28 NOTE — SUBJECTIVE & OBJECTIVE
Interval History: No acute events overnight. Patient however more tired this morning and noted to be more hypotensive. He states that he feels more drained. Mentioned increasing midodrine to 15mg TID and that if he requires further medicines, he'd likely need IV pressors to keep his MAP >65. He still would like to escalate care if need be    Review of Systems   Constitutional:  Negative for chills and fever.   HENT:  Negative for congestion and sore throat.    Eyes:  Negative for photophobia and visual disturbance.   Respiratory:  Negative for cough and shortness of breath.    Cardiovascular:  Negative for chest pain and palpitations.   Gastrointestinal:  Positive for abdominal distention. Negative for abdominal pain, blood in stool, constipation, diarrhea, nausea and vomiting.   Endocrine: Negative for cold intolerance and heat intolerance.   Genitourinary:  Negative for difficulty urinating, dysuria and hematuria.   Musculoskeletal:  Negative for arthralgias and myalgias.   Skin:  Positive for color change (jaundice). Negative for rash and wound.   Allergic/Immunologic: Negative for environmental allergies and food allergies.   Neurological:  Negative for seizures and numbness.   Hematological:  Negative for adenopathy. Does not bruise/bleed easily.   Psychiatric/Behavioral:  Negative for hallucinations and suicidal ideas.    Objective:     Vital Signs (Most Recent):  Temp: 98.3 °F (36.8 °C) (10/28/22 1131)  Pulse: 99 (10/28/22 1131)  Resp: 18 (10/28/22 0718)  BP: (!) 86/54 (10/28/22 1158)  SpO2: (!) 94 % (10/28/22 1131) Vital Signs (24h Range):  Temp:  [97.8 °F (36.6 °C)-98.3 °F (36.8 °C)] 98.3 °F (36.8 °C)  Pulse:  [] 99  Resp:  [18] 18  SpO2:  [94 %-98 %] 94 %  BP: ()/(37-62) 86/54     Weight: 93.3 kg (205 lb 11 oz)  Body mass index is 27.9 kg/m².    Intake/Output Summary (Last 24 hours) at 10/28/2022 5757  Last data filed at 10/28/2022 0915  Gross per 24 hour   Intake 240 ml   Output --   Net 240  ml        Physical Exam  Constitutional:       Appearance: He is well-developed. He is ill-appearing. He is not toxic-appearing.   HENT:      Head: Normocephalic and atraumatic.   Eyes:      General: Scleral icterus present.      Conjunctiva/sclera: Conjunctivae normal.      Pupils: Pupils are equal, round, and reactive to light.   Neck:      Thyroid: No thyromegaly.      Vascular: No JVD.   Cardiovascular:      Rate and Rhythm: Normal rate and regular rhythm.      Heart sounds: Normal heart sounds. No murmur heard.    No friction rub. No gallop.   Pulmonary:      Effort: Pulmonary effort is normal. No respiratory distress.      Breath sounds: Normal breath sounds. No wheezing or rales.   Abdominal:      General: Bowel sounds are normal. There is distension.      Palpations: Abdomen is soft. There is no mass.      Tenderness: There is no abdominal tenderness. There is no guarding or rebound.      Hernia: No hernia is present.   Musculoskeletal:         General: No deformity. Normal range of motion.      Cervical back: Normal range of motion and neck supple.   Skin:     General: Skin is warm and dry.      Coloration: Skin is jaundiced.   Neurological:      Mental Status: He is alert and oriented to person, place, and time.      Cranial Nerves: No cranial nerve deficit.   Psychiatric:         Behavior: Behavior normal.       Significant Labs: All pertinent labs within the past 24 hours have been reviewed.  CBC:   Recent Labs   Lab 10/27/22  0518 10/28/22  0620   WBC 22.53* 22.33*   HGB 8.4* 8.3*   HCT 25.3* 25.1*   * 90*       CMP:   Recent Labs   Lab 10/27/22  0518 10/28/22  0621   * 128*   K 4.1 3.7   CL 99 97   CO2 20* 15*   * 147*   BUN 58* 62*   CREATININE 3.8* 5.0*   CALCIUM 8.4* 8.4*   PROT 5.5* 5.4*   ALBUMIN 3.1* 2.8*   BILITOT 11.9* 13.3*   ALKPHOS 149* 155*   AST 42* 41*   ALT 22 20   ANIONGAP 10 16         Significant Imaging: I have reviewed all pertinent imaging results/findings  within the past 24 hours.

## 2022-10-29 LAB
A1AT PROTEOTYPE S/Z, LC-MS/MS: NORMAL
A1AT SERPL NEPH-MCNC: 122 MG/DL (ref 100–190)
ALBUMIN SERPL BCP-MCNC: 3.2 G/DL (ref 3.5–5.2)
ALP SERPL-CCNC: 134 U/L (ref 55–135)
ALT SERPL W/O P-5'-P-CCNC: 23 U/L (ref 10–44)
ANION GAP SERPL CALC-SCNC: 14 MMOL/L (ref 8–16)
AST SERPL-CCNC: 56 U/L (ref 10–40)
BASOPHILS # BLD AUTO: 0.1 K/UL (ref 0–0.2)
BASOPHILS NFR BLD: 0.6 % (ref 0–1.9)
BILIRUB SERPL-MCNC: 12.9 MG/DL (ref 0.1–1)
BUN SERPL-MCNC: 63 MG/DL (ref 6–20)
CALCIUM SERPL-MCNC: 8.6 MG/DL (ref 8.7–10.5)
CHLORIDE SERPL-SCNC: 96 MMOL/L (ref 95–110)
CO2 SERPL-SCNC: 19 MMOL/L (ref 23–29)
CREAT SERPL-MCNC: 5.3 MG/DL (ref 0.5–1.4)
DIFFERENTIAL METHOD: ABNORMAL
EOSINOPHIL # BLD AUTO: 1.1 K/UL (ref 0–0.5)
EOSINOPHIL NFR BLD: 6.6 % (ref 0–8)
ERYTHROCYTE [DISTWIDTH] IN BLOOD BY AUTOMATED COUNT: 18.4 % (ref 11.5–14.5)
EST. GFR  (NO RACE VARIABLE): 12.5 ML/MIN/1.73 M^2
GLUCOSE SERPL-MCNC: 153 MG/DL (ref 70–110)
HCT VFR BLD AUTO: 22.8 % (ref 40–54)
HGB BLD-MCNC: 7.6 G/DL (ref 14–18)
IMM GRANULOCYTES # BLD AUTO: 0.08 K/UL (ref 0–0.04)
IMM GRANULOCYTES NFR BLD AUTO: 0.5 % (ref 0–0.5)
INR PPP: 2 (ref 0.8–1.2)
LYMPHOCYTES # BLD AUTO: 2.4 K/UL (ref 1–4.8)
LYMPHOCYTES NFR BLD: 13.9 % (ref 18–48)
MCH RBC QN AUTO: 34.1 PG (ref 27–31)
MCHC RBC AUTO-ENTMCNC: 33.3 G/DL (ref 32–36)
MCV RBC AUTO: 102 FL (ref 82–98)
MONOCYTES # BLD AUTO: 1.9 K/UL (ref 0.3–1)
MONOCYTES NFR BLD: 11.2 % (ref 4–15)
NEUTROPHILS # BLD AUTO: 11.5 K/UL (ref 1.8–7.7)
NEUTROPHILS NFR BLD: 67.2 % (ref 38–73)
NRBC BLD-RTO: 0 /100 WBC
PLATELET # BLD AUTO: 71 K/UL (ref 150–450)
PMV BLD AUTO: 13.4 FL (ref 9.2–12.9)
POTASSIUM SERPL-SCNC: 3.8 MMOL/L (ref 3.5–5.1)
PROT SERPL-MCNC: 5.8 G/DL (ref 6–8.4)
PROTHROMBIN TIME: 19.5 SEC (ref 9–12.5)
RBC # BLD AUTO: 2.23 M/UL (ref 4.6–6.2)
SODIUM SERPL-SCNC: 129 MMOL/L (ref 136–145)
WBC # BLD AUTO: 17.09 K/UL (ref 3.9–12.7)

## 2022-10-29 PROCEDURE — 20600001 HC STEP DOWN PRIVATE ROOM

## 2022-10-29 PROCEDURE — 63600175 PHARM REV CODE 636 W HCPCS: Mod: JG | Performed by: STUDENT IN AN ORGANIZED HEALTH CARE EDUCATION/TRAINING PROGRAM

## 2022-10-29 PROCEDURE — 97530 THERAPEUTIC ACTIVITIES: CPT | Mod: CQ

## 2022-10-29 PROCEDURE — 25000003 PHARM REV CODE 250: Performed by: HOSPITALIST

## 2022-10-29 PROCEDURE — 80053 COMPREHEN METABOLIC PANEL: CPT | Performed by: HOSPITALIST

## 2022-10-29 PROCEDURE — 25000003 PHARM REV CODE 250: Performed by: STUDENT IN AN ORGANIZED HEALTH CARE EDUCATION/TRAINING PROGRAM

## 2022-10-29 PROCEDURE — 63600175 PHARM REV CODE 636 W HCPCS: Mod: JB | Performed by: HOSPITALIST

## 2022-10-29 PROCEDURE — 36415 COLL VENOUS BLD VENIPUNCTURE: CPT | Performed by: HOSPITALIST

## 2022-10-29 PROCEDURE — 99233 PR SUBSEQUENT HOSPITAL CARE,LEVL III: ICD-10-PCS | Mod: ,,, | Performed by: STUDENT IN AN ORGANIZED HEALTH CARE EDUCATION/TRAINING PROGRAM

## 2022-10-29 PROCEDURE — 99232 SBSQ HOSP IP/OBS MODERATE 35: CPT | Mod: ,,, | Performed by: INTERNAL MEDICINE

## 2022-10-29 PROCEDURE — P9047 ALBUMIN (HUMAN), 25%, 50ML: HCPCS | Mod: JG | Performed by: STUDENT IN AN ORGANIZED HEALTH CARE EDUCATION/TRAINING PROGRAM

## 2022-10-29 PROCEDURE — 94761 N-INVAS EAR/PLS OXIMETRY MLT: CPT

## 2022-10-29 PROCEDURE — 85025 COMPLETE CBC W/AUTO DIFF WBC: CPT | Performed by: HOSPITALIST

## 2022-10-29 PROCEDURE — 99232 PR SUBSEQUENT HOSPITAL CARE,LEVL II: ICD-10-PCS | Mod: ,,, | Performed by: INTERNAL MEDICINE

## 2022-10-29 PROCEDURE — 99233 SBSQ HOSP IP/OBS HIGH 50: CPT | Mod: ,,, | Performed by: STUDENT IN AN ORGANIZED HEALTH CARE EDUCATION/TRAINING PROGRAM

## 2022-10-29 PROCEDURE — 85610 PROTHROMBIN TIME: CPT | Performed by: HOSPITALIST

## 2022-10-29 RX ORDER — ALBUMIN HUMAN 250 G/1000ML
25 SOLUTION INTRAVENOUS EVERY 12 HOURS
Status: DISCONTINUED | OUTPATIENT
Start: 2022-10-29 | End: 2022-10-30

## 2022-10-29 RX ADMIN — MIDODRINE HYDROCHLORIDE 15 MG: 5 TABLET ORAL at 02:10

## 2022-10-29 RX ADMIN — LACTULOSE 10 G: 20 SOLUTION ORAL at 08:10

## 2022-10-29 RX ADMIN — OCTREOTIDE ACETATE 100 MCG: 100 INJECTION, SOLUTION INTRAVENOUS; SUBCUTANEOUS at 08:10

## 2022-10-29 RX ADMIN — SODIUM BICARBONATE 650 MG TABLET 1300 MG: at 08:10

## 2022-10-29 RX ADMIN — OCTREOTIDE ACETATE 100 MCG: 100 INJECTION, SOLUTION INTRAVENOUS; SUBCUTANEOUS at 02:10

## 2022-10-29 RX ADMIN — OCTREOTIDE ACETATE 100 MCG: 100 INJECTION, SOLUTION INTRAVENOUS; SUBCUTANEOUS at 05:10

## 2022-10-29 RX ADMIN — FOLIC ACID 1000 MCG: 1 TABLET ORAL at 08:10

## 2022-10-29 RX ADMIN — MIDODRINE HYDROCHLORIDE 15 MG: 5 TABLET ORAL at 08:10

## 2022-10-29 RX ADMIN — ALBUMIN (HUMAN) 25 G: 12.5 SOLUTION INTRAVENOUS at 10:10

## 2022-10-29 RX ADMIN — PREGABALIN 75 MG: 75 CAPSULE ORAL at 09:10

## 2022-10-29 RX ADMIN — PREGABALIN 75 MG: 75 CAPSULE ORAL at 08:10

## 2022-10-29 RX ADMIN — MIDODRINE HYDROCHLORIDE 15 MG: 5 TABLET ORAL at 05:10

## 2022-10-29 RX ADMIN — Medication 100 MG: at 08:10

## 2022-10-29 RX ADMIN — Medication 1 CAPSULE: at 08:10

## 2022-10-29 RX ADMIN — ALBUMIN (HUMAN) 25 G: 12.5 SOLUTION INTRAVENOUS at 09:10

## 2022-10-29 RX ADMIN — SODIUM BICARBONATE 650 MG TABLET 1300 MG: at 02:10

## 2022-10-29 NOTE — SUBJECTIVE & OBJECTIVE
Interval History: No acute events overnight. Patient's BP improved and he has more energy today. Renal function remains poor but did not worsen by much compared to yesterday. Still without any HD needs currently. Still pending [place] Medicaid status updated.     Review of Systems   Constitutional:  Negative for chills and fever.   HENT:  Negative for congestion and sore throat.    Eyes:  Negative for photophobia and visual disturbance.   Respiratory:  Negative for cough and shortness of breath.    Cardiovascular:  Negative for chest pain and palpitations.   Gastrointestinal:  Positive for abdominal distention. Negative for abdominal pain, blood in stool, constipation, diarrhea, nausea and vomiting.   Endocrine: Negative for cold intolerance and heat intolerance.   Genitourinary:  Negative for difficulty urinating, dysuria and hematuria.   Musculoskeletal:  Negative for arthralgias and myalgias.   Skin:  Positive for color change (jaundice). Negative for rash and wound.   Allergic/Immunologic: Negative for environmental allergies and food allergies.   Neurological:  Negative for seizures and numbness.   Hematological:  Negative for adenopathy. Does not bruise/bleed easily.   Psychiatric/Behavioral:  Negative for hallucinations and suicidal ideas.    Objective:     Vital Signs (Most Recent):  Temp: 97.6 °F (36.4 °C) (10/29/22 0428)  Pulse: 101 (10/29/22 0428)  Resp: 18 (10/29/22 0428)  BP: 117/70 (10/29/22 0428)  SpO2: 96 % (10/29/22 0428) Vital Signs (24h Range):  Temp:  [97.5 °F (36.4 °C)-98.3 °F (36.8 °C)] 97.6 °F (36.4 °C)  Pulse:  [] 101  Resp:  [18] 18  SpO2:  [94 %-98 %] 96 %  BP: ()/(37-70) 117/70     Weight: 97.1 kg (214 lb 1.1 oz)  Body mass index is 29.03 kg/m².    Intake/Output Summary (Last 24 hours) at 10/29/2022 1012  Last data filed at 10/29/2022 0550  Gross per 24 hour   Intake 480 ml   Output 0 ml   Net 480 ml        Physical Exam  Constitutional:       Appearance: He is well-developed.  He is ill-appearing. He is not toxic-appearing.   HENT:      Head: Normocephalic and atraumatic.   Eyes:      General: Scleral icterus present.      Conjunctiva/sclera: Conjunctivae normal.      Pupils: Pupils are equal, round, and reactive to light.   Neck:      Thyroid: No thyromegaly.      Vascular: No JVD.   Cardiovascular:      Rate and Rhythm: Normal rate and regular rhythm.      Heart sounds: Normal heart sounds. No murmur heard.    No friction rub. No gallop.   Pulmonary:      Effort: Pulmonary effort is normal. No respiratory distress.      Breath sounds: Normal breath sounds. No wheezing or rales.   Abdominal:      General: Bowel sounds are normal. There is distension.      Palpations: Abdomen is soft. There is no mass.      Tenderness: There is no abdominal tenderness. There is no guarding or rebound.      Hernia: No hernia is present.   Musculoskeletal:         General: No deformity. Normal range of motion.      Cervical back: Normal range of motion and neck supple.   Skin:     General: Skin is warm and dry.      Coloration: Skin is jaundiced.   Neurological:      Mental Status: He is alert and oriented to person, place, and time.      Cranial Nerves: No cranial nerve deficit.   Psychiatric:         Behavior: Behavior normal.       Significant Labs: All pertinent labs within the past 24 hours have been reviewed.  CBC:   Recent Labs   Lab 10/28/22  0620 10/29/22  0418   WBC 22.33* 17.09*   HGB 8.3* 7.6*   HCT 25.1* 22.8*   PLT 90* 71*       CMP:   Recent Labs   Lab 10/28/22  0621 10/29/22  0418   * 129*   K 3.7 3.8   CL 97 96   CO2 15* 19*   * 153*   BUN 62* 63*   CREATININE 5.0* 5.3*   CALCIUM 8.4* 8.6*   PROT 5.4* 5.8*   ALBUMIN 2.8* 3.2*   BILITOT 13.3* 12.9*   ALKPHOS 155* 134   AST 41* 56*   ALT 20 23   ANIONGAP 16 14         Significant Imaging: I have reviewed all pertinent imaging results/findings within the past 24 hours.

## 2022-10-29 NOTE — PLAN OF CARE
Pt AAO x 4 throughout shift. Pt up with 2 assist to bed side commode and chair. Pt free from falls and injury throughout shift. Pt worked with PT this AM. Pt awaiting to find out if patient will qualify for [place] medicaid for possible transplant eval there.

## 2022-10-29 NOTE — PROGRESS NOTES
[facility] - Transplant [facility] Medicine  Progress Note    Patient Name: Marshall Corona  MRN: [id number]  Patient Class: IP- Inpatient   Admission Date: 10/22/2022  Length of Stay: 7 days  Attending Physician: Amando Knight MD  Primary Care Provider: Primary Doctor No        Subjective:     Principal Problem:KATHE (acute kidney injury)        HPI:  TRANSFER CENTER  PHYSICIAN SUMMARY  47-year-old m with PMH HTN, GBS, diabetes, presumed alcoholic cirrhosis admitted to [facility] on 10/15 with weakness and abd distension.  Patient reports that his last drink was 2 months ago.     On admission VS unremarkable.  PEx pos for scleral icterus and abd distension with no tenderness.  He was AOX3.  WBC 12.9, Hb 7.2, Plts 142, INR 1.6 > 1. Na 132, K 3.7, CO2 21, BUN 50, Cr 1.41 (BL 0.91), bilirubin 8.8, AST 68, ALT 37, HV A/B/C neg.  COVID neg.       CT abd pos for cirrhosis with moderate volume ascites.  Also, 1.4 x 1 cm nonspecific hypoattenuating lesion in near the splenic hilum.  US abdomen pos for cirrhotic liver.  Stasis flow suggestive of portal HTN.  Moderate ascites.       On 10/18 the patient was noted to have a right axillary abscess a/w rising WBC.  The abscess was drained and patient placed on clindamycin > linezolid and CTX.  Cultures NGTD.     Hospitalization a/w increasing renal failure with  Cr 1.41 (10/15) > 1.66 (10/19) > 2 (10/21) > 3.8 (10/22) following a large volume paracentesis on 10/21.  BP fell into the mid 90s briefly following the procedure. Patient was given albumin (25 g x 4 ) and placed on midodrine     Also hosppitalization a/w increasing liver failure: T bili 8.8 > > 10.2, AST 68 > > 88. MELD Na 35     Transfer requested for liver XPL evaluation.  Transfer diagnosis acute liver failure, acute renal failure with concern for hepatorenal syndrome.     BEDSIDE EVAL    At bedside patient reports he feels ok.  Before paracentesis on 10/21, he's had one prior at a another hospital earlier this  month where 4L was removed.  He feels after recent paracentesis he has increased abdominal swelling and pain, feels fluid has reaccumulated faster or he is more bloated.  Reports last alcohol use was a few months ago, states he quit prior to diagnosis of liver disease.     Reports having Guillain Delhi syndrome in 2019 after an insect bite, stated that he has not fully recovered from this, after his discharge and receiving rehab, continued as an outpatient but when pandemic started in 2020, could not continue prior rehab efforts and reports chronic debility as a result.   \  He uses smokeless tobacco [place] Dip.  He denies any hx of Intra-nasal or injection illicit drug use.       Overview/Hospital Course:  Patient with zero family in [place], majority in [place]. Social support and limited sobriety precluding him from transplant evaluation here. Patient's family requesting transfer to [facility] at this time. They are in the process of procuring [place] medicaid. Patient otherwise remains stable but is still very ill with MELD of 36      Interval History: No acute events overnight. Patient's BP improved and he has more energy today. Renal function remains poor but did not worsen by much compared to yesterday. Still without any HD needs currently. Still pending [place] Medicaid status updated.     Review of Systems   Constitutional:  Negative for chills and fever.   HENT:  Negative for congestion and sore throat.    Eyes:  Negative for photophobia and visual disturbance.   Respiratory:  Negative for cough and shortness of breath.    Cardiovascular:  Negative for chest pain and palpitations.   Gastrointestinal:  Positive for abdominal distention. Negative for abdominal pain, blood in stool, constipation, diarrhea, nausea and vomiting.   Endocrine: Negative for cold intolerance and heat intolerance.   Genitourinary:  Negative for difficulty urinating, dysuria and hematuria.   Musculoskeletal:  Negative  for arthralgias and myalgias.   Skin:  Positive for color change (jaundice). Negative for rash and wound.   Allergic/Immunologic: Negative for environmental allergies and food allergies.   Neurological:  Negative for seizures and numbness.   Hematological:  Negative for adenopathy. Does not bruise/bleed easily.   Psychiatric/Behavioral:  Negative for hallucinations and suicidal ideas.    Objective:     Vital Signs (Most Recent):  Temp: 97.6 °F (36.4 °C) (10/29/22 0428)  Pulse: 101 (10/29/22 0428)  Resp: 18 (10/29/22 0428)  BP: 117/70 (10/29/22 0428)  SpO2: 96 % (10/29/22 0428) Vital Signs (24h Range):  Temp:  [97.5 °F (36.4 °C)-98.3 °F (36.8 °C)] 97.6 °F (36.4 °C)  Pulse:  [] 101  Resp:  [18] 18  SpO2:  [94 %-98 %] 96 %  BP: ()/(37-70) 117/70     Weight: 97.1 kg (214 lb 1.1 oz)  Body mass index is 29.03 kg/m².    Intake/Output Summary (Last 24 hours) at 10/29/2022 1012  Last data filed at 10/29/2022 0550  Gross per 24 hour   Intake 480 ml   Output 0 ml   Net 480 ml        Physical Exam  Constitutional:       Appearance: He is well-developed. He is ill-appearing. He is not toxic-appearing.   HENT:      Head: Normocephalic and atraumatic.   Eyes:      General: Scleral icterus present.      Conjunctiva/sclera: Conjunctivae normal.      Pupils: Pupils are equal, round, and reactive to light.   Neck:      Thyroid: No thyromegaly.      Vascular: No JVD.   Cardiovascular:      Rate and Rhythm: Normal rate and regular rhythm.      Heart sounds: Normal heart sounds. No murmur heard.    No friction rub. No gallop.   Pulmonary:      Effort: Pulmonary effort is normal. No respiratory distress.      Breath sounds: Normal breath sounds. No wheezing or rales.   Abdominal:      General: Bowel sounds are normal. There is distension.      Palpations: Abdomen is soft. There is no mass.      Tenderness: There is no abdominal tenderness. There is no guarding or rebound.      Hernia: No hernia is present.   Musculoskeletal:          General: No deformity. Normal range of motion.      Cervical back: Normal range of motion and neck supple.   Skin:     General: Skin is warm and dry.      Coloration: Skin is jaundiced.   Neurological:      Mental Status: He is alert and oriented to person, place, and time.      Cranial Nerves: No cranial nerve deficit.   Psychiatric:         Behavior: Behavior normal.       Significant Labs: All pertinent labs within the past 24 hours have been reviewed.  CBC:   Recent Labs   Lab 10/28/22  0620 10/29/22  0418   WBC 22.33* 17.09*   HGB 8.3* 7.6*   HCT 25.1* 22.8*   PLT 90* 71*       CMP:   Recent Labs   Lab 10/28/22  0621 10/29/22  0418   * 129*   K 3.7 3.8   CL 97 96   CO2 15* 19*   * 153*   BUN 62* 63*   CREATININE 5.0* 5.3*   CALCIUM 8.4* 8.6*   PROT 5.4* 5.8*   ALBUMIN 2.8* 3.2*   BILITOT 13.3* 12.9*   ALKPHOS 155* 134   AST 41* 56*   ALT 20 23   ANIONGAP 16 14         Significant Imaging: I have reviewed all pertinent imaging results/findings within the past 24 hours.      Assessment/Plan:      * KATHE (acute kidney injury)  Worsening  Patient with acute kidney injury likely due to HRS vs bile cast nephropathy vs ATN KATHE is currently worsening. Labs reviewed- Renal function/electrolytes with Estimated Creatinine Clearance: 20.4 mL/min (A) (based on SCr of 5.3 mg/dL (H)). according to latest data. Monitor urine output and serial BMP and adjust therapy as needed. Avoid nephrotoxins and renally dose meds for GFR listed above.   - Nephrology following  - Appears to be consistent with hepatorenal syndrome  - Cont midodrine, octreotide, albumin  - Continuing sodium bicarb for acidosis 2/2 renal function    Hypotension  Secondary to decompensated liver disease that is not improving.   - On midodrine 15mg TID  - No obvious source of ongoing infection, previously treated for abscess of axilla.   - If decompensates further, he is wanting to escalate care to ICU if needed.       Alcoholic  cirrhosis  MELD-Na score: 38 at 10/29/2022  4:18 AM  MELD score: 37 at 10/29/2022  4:18 AM  Calculated from:  Serum Creatinine: 5.3 mg/dL (Using max of 4 mg/dL) at 10/29/2022  4:18 AM  Serum Sodium: 129 mmol/L at 10/29/2022  4:18 AM  Total Bilirubin: 12.9 mg/dL at 10/29/2022  4:18 AM  INR(ratio): 2.0 at 10/29/2022  4:18 AM  Age: 47 years  Not a transplant evaluation candidate given limited sobriety and lack of family in [place]. Patient would like to transfer to [facility] however he realizes the barriers before him - lack of [place] medicaid and [place] medicaid unlikely paying for transfer or transplant in [place]  - Continuing albumin, midodrine and octreotide for HRS  - Continuing lactulose and rifaximin for HE  - s/p Vitamin K x3 days  - Para on admit negative for SBP      Decompensated hepatic cirrhosis  See alcoholic cirrhosis      Alcohol dependence in remission  - PETH negative  - reported last drink was few months ago  -continue MVI & Folic acid, add thiamine supplementation      Hyponatremia  Stable.  Secondary to underlying cirrhosis   - holding diuretics  - Oral fluid restriction         Abscess of right arm  S/p I &D at outside hospital  - patient was on Linezolid,  Ceftriaxone added here. Now off abx        Debility  Hx of Guillain barre syndrome - reports not fully recovered, left with chronic debility   -PT/OT consults.       Symptomatic anemia  Resolved, Required PRBC transfusion prior to transfer.   -trend daily CBC  -no active GI bleeding.         VTE Risk Mitigation (From admission, onward)         Ordered     Place sequential compression device  Until discontinued         10/22/22 2124                Discharge Planning   YARITZA: 11/1/2022     Code Status: Full Code   Is the patient medically ready for discharge?: No    Reason for patient still in hospital (select all that apply): Patient trending condition  Discharge Plan A: Home with family                  Amando HOLGUINAngela  MD [place]  Department of Hospital Medicine   [facility] - Transplant Stepdown

## 2022-10-29 NOTE — PT/OT/SLP PROGRESS
"Physical Therapy Treatment    Patient Name:  Marshall Corona   MRN:  [id number]    Recommendations:     Discharge Recommendations:  rehabilitation facility   Discharge Equipment Recommendations: bedside commode   Barriers to discharge: Decreased caregiver support and decrease functional mobility     Assessment:     Marshall Corona is a 47 y.o. male admitted with a medical diagnosis of KATHE (acute kidney injury).  He presents with the following impairments/functional limitations:  weakness, impaired endurance, gait instability, impaired functional mobility, pain, decreased safety awareness.  Pt was agreeable and tolerated session fairly. Session limited by increase dizziness when standing/gait. Pt completed transfer with CGA-Mikal and RW. BP at end of session 93/61, RN notified. Pt continues to benefit from therapy to improve functional endurance, strength, and mobility.     Rehab Prognosis: Fair; patient would benefit from acute skilled PT services to address these deficits and reach maximum level of function.    Recent Surgery: * No surgery found *      Plan:     During this hospitalization, patient to be seen 4 x/week to address the identified rehab impairments via gait training, therapeutic activities, therapeutic exercises, neuromuscular re-education and progress toward the following goals:    Plan of Care Expires:  11/24/22    Subjective     Chief Complaint: dizziness   Patient/Family Comments/goals: "i've been trying to get a cup of ice"  Pain/Comfort:  Pain Rating 1: 4/10  Location - Orientation 1: generalized  Location 1:  (back and abdomen)  Pain Addressed 1: Reposition, Distraction  Pain Rating Post-Intervention 1: 4/10      Objective:     Communicated with Rn prior to session.  Patient found HOB elevated with  (no active lines) upon PT entry to room.     General Precautions: Standard, fall   Orthopedic Precautions:N/A   Braces: N/A  Respiratory Status: Room air     Functional Mobility:  Additional staff " present: [facility]  Bed Mobility:   Scooting to EOB: contact guard assistance  Supine to Sit: minimum assistance; HOB elevated  Increase time to completed, assisted with trunk management   Verbal cues for sequencing    Transfers:   Sit <> Stand Transfer: contact guard assistance with rolling walker   Pt reported dizziness upon initial stand  X1 from elevated bed  Bed <> Chair Transfer: minimum assistance with rolling walker using Step Transfer technique     Gait:  Pt ambulated ~5 ft with minimum assistance and rolling walker.  Gait Deviation(s): occasional unsteady gait, wide base of support, flexed posture, decreased hollis, and decrease weight shift  Verbal/tactile cues for RW management and weight shifting.   Distance limited by fatigued and dizziness. BP taken: 93/61    AM-PAC 6 CLICK MOBILITY  Turning over in bed (including adjusting bedclothes, sheets and blankets)?: 3  Sitting down on and standing up from a chair with arms (e.g., wheelchair, bedside commode, etc.): 3  Moving from lying on back to sitting on the side of the bed?: 3  Moving to and from a bed to a chair (including a wheelchair)?: 3  Need to walk in hospital room?: 3  Climbing 3-5 steps with a railing?: 1  Basic Mobility Total Score: 16       Treatment & Education:  Seated BLE therex x10 reps: heel/toe raises, LAQ, marching   BP at end of session: 93/61. RN notified and report pt is okay to stay in chair.   Patient educated on role of therapy, goals of session, and benefits of out of bed mobility.   Instructed on use of call button and importance of calling nursing staff for assistance with mobility   Questions/concerns addressed within PTA scope of practice  Pt verbalized understanding.  Whiteboard Updated    Patient left up in chair with all lines intact, call button in reach, and RN notified..    GOALS:   Multidisciplinary Problems       Physical Therapy Goals          Problem: Physical Therapy    Goal Priority Disciplines Outcome  Goal Variances Interventions   Physical Therapy Goal     PT, PT/OT Ongoing, Progressing     Description: Goals to be met by: 2022     Patient will increase functional independence with mobility by performin. Supine to sit with Set-up [place]  2. Sit to stand transfer with supervision  3. Gait  x 150 feet with Supervision using Rolling Walker.   4. Ascend/descend 3 stair with bilateral Handrails Minimal Assistance using No Assistive Device.   5. Stand for 5 minutes with Modified [place] using Rolling Walker or no AD                         Time Tracking:     PT Received On: 10/29/22  PT Start Time: 944     PT Stop Time: 1000  PT Total Time (min): 16 min     Billable Minutes: Therapeutic Activity 16    Treatment Type: Treatment  PT/PTA: PTA     PTA Visit Number: 3     10/29/2022

## 2022-10-29 NOTE — SUBJECTIVE & OBJECTIVE
"Interval History: NAEON. Patient continues to have worsening renal function. Anuric x48 hours. No SOB or worsening abdominal pain. Will monitor closely for RRT needs.     Review of patient's allergies indicates:   Allergen Reactions    Codeine Itching, Anaphylaxis and Hives    Hydrocodone-acetaminophen      "Agitation"    Influenza virus vaccines Other (See Comments)    Oxycodone-acetaminophen      "Agitation"     Current Facility-Administered Medications   Medication Frequency    acetaminophen tablet 650 mg Q8H PRN    dextrose 10% bolus 125 mL PRN    dextrose 10% bolus 250 mL PRN    folic acid tablet 1,000 mcg Daily    Lactobacillus rhamnosus GG capsule 1 capsule Daily    lactulose 20 gram/30 mL solution Soln 10 g BID    miconazole NITRATE 2 % top powder BID    miconazole nitrate 2% ointment PRN    midodrine tablet 15 mg Q8H    octreotide injection 100 mcg Q8H    ondansetron injection 4 mg Q6H PRN    pregabalin capsule 75 mg BID    prochlorperazine injection Soln 5 mg Q6H PRN    sodium bicarbonate tablet 1,300 mg TID    thiamine tablet 100 mg Daily       Objective:     Vital Signs (Most Recent):  Temp: 97.6 °F (36.4 °C) (10/29/22 0428)  Pulse: 101 (10/29/22 0428)  Resp: 18 (10/29/22 0428)  BP: 117/70 (10/29/22 0428)  SpO2: 96 % (10/29/22 0428)  O2 Device (Oxygen Therapy): room air (10/29/22 0428) Vital Signs (24h Range):  Temp:  [97.5 °F (36.4 °C)-98.3 °F (36.8 °C)] 97.6 °F (36.4 °C)  Pulse:  [] 101  Resp:  [18] 18  SpO2:  [94 %-98 %] 96 %  BP: ()/(37-70) 117/70     Weight: 97.1 kg (214 lb 1.1 oz) (10/29/22 0545)  Body mass index is 29.03 kg/m².  Body surface area is 2.22 meters squared.    I/O last 3 completed shifts:  In: 600 [P.O.:600]  Out: 0     Physical Exam  Constitutional:       Appearance: Normal appearance.   Eyes:      General: Scleral icterus present.      Conjunctiva/sclera: Conjunctivae normal.   Cardiovascular:      Rate and Rhythm: Normal rate and regular rhythm.      Pulses: Normal " pulses.      Heart sounds: Normal heart sounds.   Pulmonary:      Effort: Pulmonary effort is normal. No respiratory distress.      Breath sounds: Normal breath sounds.   Abdominal:      General: Bowel sounds are normal. There is distension (worse today).      Palpations: Abdomen is soft.      Tenderness: There is no abdominal tenderness.   Musculoskeletal:      Right lower leg: Edema (trace pitting) present.      Left lower leg: Edema (trace pitting) present.   Skin:     General: Skin is warm and dry.      Coloration: Skin is jaundiced.      Findings: No bruising.   Neurological:      Mental Status: He is alert and oriented to person, place, and time.       Significant Labs:  CBC:   Recent Labs   Lab 10/29/22  0418   WBC 17.09*   RBC 2.23*   HGB 7.6*   HCT 22.8*   PLT 71*   *   MCH 34.1*   MCHC 33.3       CMP:   Recent Labs   Lab 10/29/22  0418   *   CALCIUM 8.6*   ALBUMIN 3.2*   PROT 5.8*   *   K 3.8   CO2 19*   CL 96   BUN 63*   CREATININE 5.3*   ALKPHOS 134   ALT 23   AST 56*   BILITOT 12.9*       All labs within the past 24 hours have been reviewed.     Significant Imaging:

## 2022-10-29 NOTE — PROGRESS NOTES
"[facility] - Transplant Stepdown  Nephrology  Progress Note    Patient Name: Marshall Corona  MRN: [id number]  Admission Date: 10/22/2022  Hospital Length of Stay: 7 days  Attending Provider: Amando Knight MD   Primary Care Physician: Primary Doctor No  Principal Problem:KATHE (acute kidney injury)    Subjective:     HPI: Marshall Corona is a 47-year-old male with hypertension, diabetes, alcoholic cirrhosis who presented to OSH with weakness and abdominal distention. Patient was subsequently transferred to [facility] for liver transplant evaluation.  During hospitalization at OSH patient noted to have right axillary abscess which was drained and he was subsequently started on clindamycin and transitioned to linezolid.  Additionally patient with worsening renal function during admission with creatinine of 1.4 on admit with subsequent worsening up to 3.8 at the time of transfer.  Outside records noting worsening renal function following paracentesis and associated hypotension for which he received albumin and was started on midodrine.  Nephrology was consulted for the management of KATHE.      Interval History: NAEON. Patient continues to have worsening renal function. Anuric x48 hours. No SOB or worsening abdominal pain. Will monitor closely for RRT needs.     Review of patient's allergies indicates:   Allergen Reactions    Codeine Itching, Anaphylaxis and Hives    Hydrocodone-acetaminophen      "Agitation"    Influenza virus vaccines Other (See Comments)    Oxycodone-acetaminophen      "Agitation"     Current Facility-Administered Medications   Medication Frequency    acetaminophen tablet 650 mg Q8H PRN    dextrose 10% bolus 125 mL PRN    dextrose 10% bolus 250 mL PRN    folic acid tablet 1,000 mcg Daily    Lactobacillus rhamnosus GG capsule 1 capsule Daily    lactulose 20 gram/30 mL solution Soln 10 g BID    miconazole NITRATE 2 % top powder BID    miconazole nitrate 2% ointment PRN    midodrine tablet 15 mg Q8H    " octreotide injection 100 mcg Q8H    ondansetron injection 4 mg Q6H PRN    pregabalin capsule 75 mg BID    prochlorperazine injection Soln 5 mg Q6H PRN    sodium bicarbonate tablet 1,300 mg TID    thiamine tablet 100 mg Daily       Objective:     Vital Signs (Most Recent):  Temp: 97.6 °F (36.4 °C) (10/29/22 0428)  Pulse: 101 (10/29/22 0428)  Resp: 18 (10/29/22 0428)  BP: 117/70 (10/29/22 0428)  SpO2: 96 % (10/29/22 0428)  O2 Device (Oxygen Therapy): room air (10/29/22 0428) Vital Signs (24h Range):  Temp:  [97.5 °F (36.4 °C)-98.3 °F (36.8 °C)] 97.6 °F (36.4 °C)  Pulse:  [] 101  Resp:  [18] 18  SpO2:  [94 %-98 %] 96 %  BP: ()/(37-70) 117/70     Weight: 97.1 kg (214 lb 1.1 oz) (10/29/22 0545)  Body mass index is 29.03 kg/m².  Body surface area is 2.22 meters squared.    I/O last 3 completed shifts:  In: 600 [P.O.:600]  Out: 0     Physical Exam  Constitutional:       Appearance: Normal appearance.   Eyes:      General: Scleral icterus present.      Conjunctiva/sclera: Conjunctivae normal.   Cardiovascular:      Rate and Rhythm: Normal rate and regular rhythm.      Pulses: Normal pulses.      Heart sounds: Normal heart sounds.   Pulmonary:      Effort: Pulmonary effort is normal. No respiratory distress.      Breath sounds: Normal breath sounds.   Abdominal:      General: Bowel sounds are normal. There is distension (worse today).      Palpations: Abdomen is soft.      Tenderness: There is no abdominal tenderness.   Musculoskeletal:      Right lower leg: Edema (trace pitting) present.      Left lower leg: Edema (trace pitting) present.   Skin:     General: Skin is warm and dry.      Coloration: Skin is jaundiced.      Findings: No bruising.   Neurological:      Mental Status: He is alert and oriented to person, place, and time.       Significant Labs:  CBC:   Recent Labs   Lab 10/29/22  0418   WBC 17.09*   RBC 2.23*   HGB 7.6*   HCT 22.8*   PLT 71*   *   MCH 34.1*   MCHC 33.3       CMP:   Recent  Labs   Lab 10/29/22  0418   *   CALCIUM 8.6*   ALBUMIN 3.2*   PROT 5.8*   *   K 3.8   CO2 19*   CL 96   BUN 63*   CREATININE 5.3*   ALKPHOS 134   ALT 23   AST 56*   BILITOT 12.9*       All labs within the past 24 hours have been reviewed.     Significant Imaging:       Assessment/Plan:     * KATHE (acute kidney injury)  Patient with baseline creatinine of 1.0 which worsened to creatinine of 1.4 at the time of admission to OSH and subsequently 3.8 upon transfer to [facility].  Notably, creatinine 2.4 upon repeat so suspect 3.8 was erroneous. In the setting of cirrhosis, there was some concern for hepatorenal syndrome so patient was started on albumin and midodrine.  Urine sodium (<10) consistent with hepatorenal syndrome but patient with robust blood pressures so less likely.  Lower suspicion for abdominal compartment syndrome given recent paracentesis.  Bilirubin elevated.  Suspect multifactorial including HRS c/b ATN v bilirubin tubulopathy.     Patient with steadily worsening renal function during admission. Anuric since 10/28. Cr 5.3 on 10/29. No significant acidosis, electrolyte abnormality, or hypervolemia noted. Anticipate he will need RRT in the near future though no need at this time. Will continue to monitor closely.      Recommendations:   - no urgent indication for RRT at this time   - okay to continue HRS regimen at this time  - s/p albumin protocol for volume expansion  - strict intake and output  - maintain map > 85 if treating for HRS  - renally dose medications and avoid nephrotoxins when possible          Thank you for your consult. I will follow-up with patient. Please contact us if you have any additional questions.    Duy Hdz MD  Nephrology  [facility] - Transplant Stepdown

## 2022-10-29 NOTE — ASSESSMENT & PLAN NOTE
Patient with baseline creatinine of 1.0 which worsened to creatinine of 1.4 at the time of admission to OSH and subsequently 3.8 upon transfer to [facility].  Notably, creatinine 2.4 upon repeat so suspect 3.8 was erroneous. In the setting of cirrhosis, there was some concern for hepatorenal syndrome so patient was started on albumin and midodrine.  Urine sodium (<10) consistent with hepatorenal syndrome but patient with robust blood pressures so less likely.  Lower suspicion for abdominal compartment syndrome given recent paracentesis.  Bilirubin elevated.  Suspect multifactorial including HRS c/b ATN v bilirubin tubulopathy.     Patient with steadily worsening renal function during admission. Anuric since 10/28. Cr 5.3 on 10/29. No significant acidosis, electrolyte abnormality, or hypervolemia noted. Anticipate he will need RRT in the near future though no need at this time. Will continue to monitor closely.      Recommendations:   - no urgent indication for RRT at this time   - okay to continue HRS regimen at this time  - s/p albumin protocol for volume expansion  - strict intake and output  - maintain map > 85 if treating for HRS  - renally dose medications and avoid nephrotoxins when possible

## 2022-10-29 NOTE — ASSESSMENT & PLAN NOTE
MELD-Na score: 38 at 10/29/2022  4:18 AM  MELD score: 37 at 10/29/2022  4:18 AM  Calculated from:  Serum Creatinine: 5.3 mg/dL (Using max of 4 mg/dL) at 10/29/2022  4:18 AM  Serum Sodium: 129 mmol/L at 10/29/2022  4:18 AM  Total Bilirubin: 12.9 mg/dL at 10/29/2022  4:18 AM  INR(ratio): 2.0 at 10/29/2022  4:18 AM  Age: 47 years  Not a transplant evaluation candidate given limited sobriety and lack of family in [place]. Patient would like to transfer to [facility] however he realizes the barriers before him - lack of [place] medicaid and [place] medicaid unlikely paying for transfer or transplant in [place]  - Continuing albumin, midodrine and octreotide for HRS  - Continuing lactulose and rifaximin for HE  - s/p Vitamin K x3 days  - Para on admit negative for SBP

## 2022-10-29 NOTE — ASSESSMENT & PLAN NOTE
Worsening  Patient with acute kidney injury likely due to HRS vs bile cast nephropathy vs ATN KATHE is currently worsening. Labs reviewed- Renal function/electrolytes with Estimated Creatinine Clearance: 20.4 mL/min (A) (based on SCr of 5.3 mg/dL (H)). according to latest data. Monitor urine output and serial BMP and adjust therapy as needed. Avoid nephrotoxins and renally dose meds for GFR listed above.   - Nephrology following  - Appears to be consistent with hepatorenal syndrome  - Cont midodrine, octreotide, albumin  - Continuing sodium bicarb for acidosis 2/2 renal function

## 2022-10-29 NOTE — PLAN OF CARE
Pt. AAOx4, VSS, spo2> 94% on room air. Pt. Up to BSC with 1 person assist. No complaints of pain/N/V this shift. Bed in low locked position, call light within reach, pt instructed to call for assistance needed, pt verbalized understanding, remains free from falls this shift. WCTM.

## 2022-10-30 LAB
ALBUMIN SERPL BCP-MCNC: 3.5 G/DL (ref 3.5–5.2)
ALP SERPL-CCNC: 160 U/L (ref 55–135)
ALT SERPL W/O P-5'-P-CCNC: 21 U/L (ref 10–44)
ANION GAP SERPL CALC-SCNC: 16 MMOL/L (ref 8–16)
AST SERPL-CCNC: 50 U/L (ref 10–40)
BASOPHILS # BLD AUTO: 0.06 K/UL (ref 0–0.2)
BASOPHILS NFR BLD: 0.4 % (ref 0–1.9)
BILIRUB SERPL-MCNC: 13.1 MG/DL (ref 0.1–1)
BUN SERPL-MCNC: 68 MG/DL (ref 6–20)
CALCIUM SERPL-MCNC: 8.9 MG/DL (ref 8.7–10.5)
CHLORIDE SERPL-SCNC: 94 MMOL/L (ref 95–110)
CO2 SERPL-SCNC: 19 MMOL/L (ref 23–29)
CREAT SERPL-MCNC: 6.4 MG/DL (ref 0.5–1.4)
DIFFERENTIAL METHOD: ABNORMAL
EOSINOPHIL # BLD AUTO: 0.9 K/UL (ref 0–0.5)
EOSINOPHIL NFR BLD: 5.6 % (ref 0–8)
ERYTHROCYTE [DISTWIDTH] IN BLOOD BY AUTOMATED COUNT: 18.2 % (ref 11.5–14.5)
EST. GFR  (NO RACE VARIABLE): 9.9 ML/MIN/1.73 M^2
GLUCOSE SERPL-MCNC: 159 MG/DL (ref 70–110)
HCT VFR BLD AUTO: 23.3 % (ref 40–54)
HGB BLD-MCNC: 7.9 G/DL (ref 14–18)
IMM GRANULOCYTES # BLD AUTO: 0.07 K/UL (ref 0–0.04)
IMM GRANULOCYTES NFR BLD AUTO: 0.5 % (ref 0–0.5)
INR PPP: 1.8 (ref 0.8–1.2)
LYMPHOCYTES # BLD AUTO: 2.3 K/UL (ref 1–4.8)
LYMPHOCYTES NFR BLD: 15.2 % (ref 18–48)
MCH RBC QN AUTO: 34.3 PG (ref 27–31)
MCHC RBC AUTO-ENTMCNC: 33.9 G/DL (ref 32–36)
MCV RBC AUTO: 101 FL (ref 82–98)
MONOCYTES # BLD AUTO: 1.3 K/UL (ref 0.3–1)
MONOCYTES NFR BLD: 8.2 % (ref 4–15)
NEUTROPHILS # BLD AUTO: 10.8 K/UL (ref 1.8–7.7)
NEUTROPHILS NFR BLD: 70.1 % (ref 38–73)
NRBC BLD-RTO: 0 /100 WBC
PLATELET # BLD AUTO: 65 K/UL (ref 150–450)
PMV BLD AUTO: 13.4 FL (ref 9.2–12.9)
POTASSIUM SERPL-SCNC: 3.6 MMOL/L (ref 3.5–5.1)
PROT SERPL-MCNC: 6 G/DL (ref 6–8.4)
PROTHROMBIN TIME: 17.9 SEC (ref 9–12.5)
RBC # BLD AUTO: 2.3 M/UL (ref 4.6–6.2)
SARS-COV-2 RDRP RESP QL NAA+PROBE: NEGATIVE
SODIUM SERPL-SCNC: 129 MMOL/L (ref 136–145)
WBC # BLD AUTO: 15.41 K/UL (ref 3.9–12.7)

## 2022-10-30 PROCEDURE — 25000003 PHARM REV CODE 250: Performed by: STUDENT IN AN ORGANIZED HEALTH CARE EDUCATION/TRAINING PROGRAM

## 2022-10-30 PROCEDURE — 99233 SBSQ HOSP IP/OBS HIGH 50: CPT | Mod: ,,, | Performed by: STUDENT IN AN ORGANIZED HEALTH CARE EDUCATION/TRAINING PROGRAM

## 2022-10-30 PROCEDURE — 80053 COMPREHEN METABOLIC PANEL: CPT | Performed by: HOSPITALIST

## 2022-10-30 PROCEDURE — 94761 N-INVAS EAR/PLS OXIMETRY MLT: CPT

## 2022-10-30 PROCEDURE — 99233 PR SUBSEQUENT HOSPITAL CARE,LEVL III: ICD-10-PCS | Mod: ,,, | Performed by: STUDENT IN AN ORGANIZED HEALTH CARE EDUCATION/TRAINING PROGRAM

## 2022-10-30 PROCEDURE — U0002 COVID-19 LAB TEST NON-CDC: HCPCS | Performed by: STUDENT IN AN ORGANIZED HEALTH CARE EDUCATION/TRAINING PROGRAM

## 2022-10-30 PROCEDURE — 36415 COLL VENOUS BLD VENIPUNCTURE: CPT | Performed by: HOSPITALIST

## 2022-10-30 PROCEDURE — 85025 COMPLETE CBC W/AUTO DIFF WBC: CPT | Performed by: HOSPITALIST

## 2022-10-30 PROCEDURE — 63600175 PHARM REV CODE 636 W HCPCS: Mod: JB | Performed by: HOSPITALIST

## 2022-10-30 PROCEDURE — 63600175 PHARM REV CODE 636 W HCPCS: Mod: JG | Performed by: STUDENT IN AN ORGANIZED HEALTH CARE EDUCATION/TRAINING PROGRAM

## 2022-10-30 PROCEDURE — 25000003 PHARM REV CODE 250: Performed by: HOSPITALIST

## 2022-10-30 PROCEDURE — 20600001 HC STEP DOWN PRIVATE ROOM

## 2022-10-30 PROCEDURE — 85610 PROTHROMBIN TIME: CPT | Performed by: HOSPITALIST

## 2022-10-30 PROCEDURE — P9047 ALBUMIN (HUMAN), 25%, 50ML: HCPCS | Mod: JG | Performed by: STUDENT IN AN ORGANIZED HEALTH CARE EDUCATION/TRAINING PROGRAM

## 2022-10-30 RX ADMIN — LACTULOSE 10 G: 20 SOLUTION ORAL at 07:10

## 2022-10-30 RX ADMIN — MIDODRINE HYDROCHLORIDE 15 MG: 5 TABLET ORAL at 05:10

## 2022-10-30 RX ADMIN — OCTREOTIDE ACETATE 100 MCG: 100 INJECTION, SOLUTION INTRAVENOUS; SUBCUTANEOUS at 05:10

## 2022-10-30 RX ADMIN — PREGABALIN 75 MG: 75 CAPSULE ORAL at 07:10

## 2022-10-30 RX ADMIN — MIDODRINE HYDROCHLORIDE 15 MG: 5 TABLET ORAL at 08:10

## 2022-10-30 RX ADMIN — SODIUM BICARBONATE 650 MG TABLET 1300 MG: at 08:10

## 2022-10-30 RX ADMIN — FOLIC ACID 1000 MCG: 1 TABLET ORAL at 07:10

## 2022-10-30 RX ADMIN — SODIUM BICARBONATE 650 MG TABLET 1300 MG: at 02:10

## 2022-10-30 RX ADMIN — Medication 100 MG: at 07:10

## 2022-10-30 RX ADMIN — Medication 1 CAPSULE: at 07:10

## 2022-10-30 RX ADMIN — OCTREOTIDE ACETATE 100 MCG: 100 INJECTION, SOLUTION INTRAVENOUS; SUBCUTANEOUS at 02:10

## 2022-10-30 RX ADMIN — MIDODRINE HYDROCHLORIDE 15 MG: 5 TABLET ORAL at 02:10

## 2022-10-30 RX ADMIN — LACTULOSE 10 G: 20 SOLUTION ORAL at 08:10

## 2022-10-30 RX ADMIN — ALBUMIN (HUMAN) 25 G: 12.5 SOLUTION INTRAVENOUS at 07:10

## 2022-10-30 RX ADMIN — OCTREOTIDE ACETATE 100 MCG: 100 INJECTION, SOLUTION INTRAVENOUS; SUBCUTANEOUS at 08:10

## 2022-10-30 RX ADMIN — PREGABALIN 75 MG: 75 CAPSULE ORAL at 08:10

## 2022-10-30 RX ADMIN — SODIUM BICARBONATE 650 MG TABLET 1300 MG: at 07:10

## 2022-10-30 NOTE — ASSESSMENT & PLAN NOTE
MELD-Na score: 37 at 10/30/2022  7:34 AM  MELD score: 36 at 10/30/2022  7:34 AM  Calculated from:  Serum Creatinine: 6.4 mg/dL (Using max of 4 mg/dL) at 10/30/2022  7:34 AM  Serum Sodium: 129 mmol/L at 10/30/2022  7:34 AM  Total Bilirubin: 13.1 mg/dL at 10/30/2022  7:34 AM  INR(ratio): 1.8 at 10/30/2022  7:34 AM  Age: 47 years  Not a transplant evaluation candidate given limited sobriety and lack of family in [place]. Patient would like to transfer to [facility] however he realizes the barriers before him - lack of [place] medicaid and [place] unlikely paying for transfer or transplant in [place]  - Continuing midodrine and octreotide for HRS, has not responded to albumin  - Continuing lactulose and rifaximin for HE  - s/p Vitamin K x3 days  - Para on admit negative for SBP

## 2022-10-30 NOTE — SUBJECTIVE & OBJECTIVE
"Interval History: No acute events overnight. Patient's BP remains stable. Reports "okay" energy. Asking about possible paracentesis today to help with distension, likely will happen tomorrow. Sister states she set up transport to [place] for Tuesday evening.    Review of Systems   Constitutional:  Negative for chills and fever.   HENT:  Negative for congestion and sore throat.    Eyes:  Negative for photophobia and visual disturbance.   Respiratory:  Negative for cough and shortness of breath.    Cardiovascular:  Negative for chest pain and palpitations.   Gastrointestinal:  Positive for abdominal distention. Negative for abdominal pain, blood in stool, constipation, diarrhea, nausea and vomiting.   Endocrine: Negative for cold intolerance and heat intolerance.   Genitourinary:  Negative for difficulty urinating, dysuria and hematuria.   Musculoskeletal:  Negative for arthralgias and myalgias.   Skin:  Positive for color change (jaundice). Negative for rash and wound.   Allergic/Immunologic: Negative for environmental allergies and food allergies.   Neurological:  Negative for seizures and numbness.   Hematological:  Negative for adenopathy. Does not bruise/bleed easily.   Psychiatric/Behavioral:  Negative for hallucinations and suicidal ideas.    Objective:     Vital Signs (Most Recent):  Temp: 98.4 °F (36.9 °C) (10/30/22 0745)  Pulse: 101 (10/30/22 0745)  Resp: 18 (10/30/22 0745)  BP: 119/64 (10/30/22 0745)  SpO2: 97 % (10/30/22 0745) Vital Signs (24h Range):  Temp:  [97.4 °F (36.3 °C)-98.4 °F (36.9 °C)] 98.4 °F (36.9 °C)  Pulse:  [100-107] 101  Resp:  [18] 18  SpO2:  [95 %-97 %] 97 %  BP: ()/(55-72) 119/64     Weight: 97.1 kg (214 lb 1.1 oz)  Body mass index is 29.03 kg/m².    Intake/Output Summary (Last 24 hours) at 10/30/2022 0822  Last data filed at 10/29/2022 1720  Gross per 24 hour   Intake 980 ml   Output 0 ml   Net 980 ml        Physical Exam  Constitutional:       Appearance: He is well-developed. " He is ill-appearing. He is not toxic-appearing.   HENT:      Head: Normocephalic and atraumatic.   Eyes:      General: Scleral icterus present.      Conjunctiva/sclera: Conjunctivae normal.      Pupils: Pupils are equal, round, and reactive to light.   Neck:      Thyroid: No thyromegaly.      Vascular: No JVD.   Cardiovascular:      Rate and Rhythm: Normal rate and regular rhythm.      Heart sounds: Normal heart sounds. No murmur heard.    No friction rub. No gallop.   Pulmonary:      Effort: Pulmonary effort is normal. No respiratory distress.      Breath sounds: Normal breath sounds. No wheezing or rales.   Abdominal:      General: Bowel sounds are normal. There is distension.      Palpations: Abdomen is soft. There is no mass.      Tenderness: There is no abdominal tenderness. There is no guarding or rebound.      Hernia: No hernia is present.   Musculoskeletal:         General: No deformity. Normal range of motion.      Cervical back: Normal range of motion and neck supple.   Skin:     General: Skin is warm and dry.      Coloration: Skin is jaundiced.   Neurological:      Mental Status: He is alert and oriented to person, place, and time.      Cranial Nerves: No cranial nerve deficit.   Psychiatric:         Behavior: Behavior normal.       Significant Labs: All pertinent labs within the past 24 hours have been reviewed.  CBC:   Recent Labs   Lab 10/29/22  0418 10/30/22  0734   WBC 17.09* 15.41*   HGB 7.6* 7.9*   HCT 22.8* 23.3*   PLT 71* 65*       CMP:   Recent Labs   Lab 10/29/22  0418 10/30/22  0734   * 129*   K 3.8 3.6   CL 96 94*   CO2 19* 19*   * 159*   BUN 63* 68*   CREATININE 5.3* 6.4*   CALCIUM 8.6* 8.9   PROT 5.8* 6.0   ALBUMIN 3.2* 3.5   BILITOT 12.9* 13.1*   ALKPHOS 134 160*   AST 56* 50*   ALT 23 21   ANIONGAP 14 16         Significant Imaging: I have reviewed all pertinent imaging results/findings within the past 24 hours.

## 2022-10-30 NOTE — PROGRESS NOTES
[facility] Medicine  Progress Note    Patient Name: Marshall Corona  MRN: [id number]  Patient Class: IP- Inpatient   Admission Date: 10/22/2022  Length of Stay: 8 days  Attending Physician: Amando Knight MD  Primary Care Provider: Primary Doctor No        Subjective:     Principal Problem:KATHE (acute kidney injury)        HPI:  TRANSFER CENTER  PHYSICIAN SUMMARY  47-year-old m with PMH HTN, GBS, diabetes, presumed alcoholic cirrhosis admitted to [facility] on 10/15 with weakness and abd distension.  Patient reports that his last drink was 2 months ago.     On admission VS unremarkable.  PEx pos for scleral icterus and abd distension with no tenderness.  He was AOX3.  WBC 12.9, Hb 7.2, Plts 142, INR 1.6 > 1. Na 132, K 3.7, CO2 21, BUN 50, Cr 1.41 (BL 0.91), bilirubin 8.8, AST 68, ALT 37, HV A/B/C neg.  COVID neg.       CT abd pos for cirrhosis with moderate volume ascites.  Also, 1.4 x 1 cm nonspecific hypoattenuating lesion in near the splenic hilum.  US abdomen pos for cirrhotic liver.  Stasis flow suggestive of portal HTN.  Moderate ascites.       On 10/18 the patient was noted to have a right axillary abscess a/w rising WBC.  The abscess was drained and patient placed on clindamycin > linezolid and CTX.  Cultures NGTD.     Hospitalization a/w increasing renal failure with  Cr 1.41 (10/15) > 1.66 (10/19) > 2 (10/21) > 3.8 (10/22) following a large volume paracentesis on 10/21.  BP fell into the mid 90s briefly following the procedure. Patient was given albumin (25 g x 4 ) and placed on midodrine     Also hosppitalization a/w increasing liver failure: T bili 8.8 > > 10.2, AST 68 > > 88. MELD Na 35     Transfer requested for liver XPL evaluation.  Transfer diagnosis acute liver failure, acute renal failure with concern for hepatorenal syndrome.     BEDSIDE EVAL    At bedside patient reports he feels ok.  Before paracentesis on 10/21, he's had one prior at a another hospital earlier this  "month where 4L was removed.  He feels after recent paracentesis he has increased abdominal swelling and pain, feels fluid has reaccumulated faster or he is more bloated.  Reports last alcohol use was a few months ago, states he quit prior to diagnosis of liver disease.     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   \  He uses smokeless tobacco [place] Dip.  He denies any hx of Intra-nasal or injection illicit drug use.       Overview/Hospital Course:  Patient with zero family in [place], majority in [place]. Social support and limited sobriety precluding him from transplant evaluation here. Patient's family requesting transfer to [facility] at this time. They are in the process of procuring [place] medicaid. Patient otherwise remains stable but is still very ill with MELD of 36      Interval History: No acute events overnight. Patient's BP remains stable. Reports "okay" energy. Asking about possible paracentesis today to help with distension, likely will happen tomorrow. Sister states she set up transport to [place] for Tuesday evening.    Review of Systems   Constitutional:  Negative for chills and fever.   HENT:  Negative for congestion and sore throat.    Eyes:  Negative for photophobia and visual disturbance.   Respiratory:  Negative for cough and shortness of breath.    Cardiovascular:  Negative for chest pain and palpitations.   Gastrointestinal:  Positive for abdominal distention. Negative for abdominal pain, blood in stool, constipation, diarrhea, nausea and vomiting.   Endocrine: Negative for cold intolerance and heat intolerance.   Genitourinary:  Negative for difficulty urinating, dysuria and hematuria.   Musculoskeletal:  Negative " for arthralgias and myalgias.   Skin:  Positive for color change (jaundice). Negative for rash and wound.   Allergic/Immunologic: Negative for environmental allergies and food allergies.   Neurological:  Negative for seizures and numbness.   Hematological:  Negative for adenopathy. Does not bruise/bleed easily.   Psychiatric/Behavioral:  Negative for hallucinations and suicidal ideas.    Objective:     Vital Signs (Most Recent):  Temp: 98.4 °F (36.9 °C) (10/30/22 0745)  Pulse: 101 (10/30/22 0745)  Resp: 18 (10/30/22 0745)  BP: 119/64 (10/30/22 0745)  SpO2: 97 % (10/30/22 0745) Vital Signs (24h Range):  Temp:  [97.4 °F (36.3 °C)-98.4 °F (36.9 °C)] 98.4 °F (36.9 °C)  Pulse:  [100-107] 101  Resp:  [18] 18  SpO2:  [95 %-97 %] 97 %  BP: ()/(55-72) 119/64     Weight: 97.1 kg (214 lb 1.1 oz)  Body mass index is 29.03 kg/m².    Intake/Output Summary (Last 24 hours) at 10/30/2022 0854  Last data filed at 10/29/2022 1720  Gross per 24 hour   Intake 980 ml   Output 0 ml   Net 980 ml        Physical Exam  Constitutional:       Appearance: He is well-developed. He is ill-appearing. He is not toxic-appearing.   HENT:      Head: Normocephalic and atraumatic.   Eyes:      General: Scleral icterus present.      Conjunctiva/sclera: Conjunctivae normal.      Pupils: Pupils are equal, round, and reactive to light.   Neck:      Thyroid: No thyromegaly.      Vascular: No JVD.   Cardiovascular:      Rate and Rhythm: Normal rate and regular rhythm.      Heart sounds: Normal heart sounds. No murmur heard.    No friction rub. No gallop.   Pulmonary:      Effort: Pulmonary effort is normal. No respiratory distress.      Breath sounds: Normal breath sounds. No wheezing or rales.   Abdominal:      General: Bowel sounds are normal. There is distension.      Palpations: Abdomen is soft. There is no mass.      Tenderness: There is no abdominal tenderness. There is no guarding or rebound.      Hernia: No hernia is present.   Musculoskeletal:          General: No deformity. Normal range of motion.      Cervical back: Normal range of motion and neck supple.   Skin:     General: Skin is warm and dry.      Coloration: Skin is jaundiced.   Neurological:      Mental Status: He is alert and oriented to person, place, and time.      Cranial Nerves: No cranial nerve deficit.   Psychiatric:         Behavior: Behavior normal.       Significant Labs: All pertinent labs within the past 24 hours have been reviewed.  CBC:   Recent Labs   Lab 10/29/22  0418 10/30/22  0734   WBC 17.09* 15.41*   HGB 7.6* 7.9*   HCT 22.8* 23.3*   PLT 71* 65*       CMP:   Recent Labs   Lab 10/29/22  0418 10/30/22  0734   * 129*   K 3.8 3.6   CL 96 94*   CO2 19* 19*   * 159*   BUN 63* 68*   CREATININE 5.3* 6.4*   CALCIUM 8.6* 8.9   PROT 5.8* 6.0   ALBUMIN 3.2* 3.5   BILITOT 12.9* 13.1*   ALKPHOS 134 160*   AST 56* 50*   ALT 23 21   ANIONGAP 14 16         Significant Imaging: I have reviewed all pertinent imaging results/findings within the past 24 hours.      Assessment/Plan:      * KATHE (acute kidney injury)  Worsening  Patient with acute kidney injury likely due to HRS vs bile cast nephropathy vs ATN KATHE is currently worsening. Labs reviewed- Renal function/electrolytes with Estimated Creatinine Clearance: 16.9 mL/min (A) (based on SCr of 6.4 mg/dL (H)). according to latest data. Monitor urine output and serial BMP and adjust therapy as needed. Avoid nephrotoxins and renally dose meds for GFR listed above.   - No emergent needs for HD at this time, K+ remains stable but BUN/Cr rising  - Nephrology following  - Cont midodrine, octreotide, albumin  - Continuing sodium bicarb for acidosis 2/2 renal function    Hypotension  Secondary to decompensated liver disease that is not improving.   - On midodrine 15mg TID  - No obvious source of ongoing infection, previously treated for abscess of axilla.   - If decompensates further, he is wanting to escalate care to ICU if needed.        Alcoholic cirrhosis  MELD-Na score: 37 at 10/30/2022  7:34 AM  MELD score: 36 at 10/30/2022  7:34 AM  Calculated from:  Serum Creatinine: 6.4 mg/dL (Using max of 4 mg/dL) at 10/30/2022  7:34 AM  Serum Sodium: 129 mmol/L at 10/30/2022  7:34 AM  Total Bilirubin: 13.1 mg/dL at 10/30/2022  7:34 AM  INR(ratio): 1.8 at 10/30/2022  7:34 AM  Age: 47 years  Not a transplant evaluation candidate given limited sobriety and lack of family in [place]. Patient would like to transfer to [facility] however he realizes the barriers before him - lack of [place] medicaid and [place] medicaid unlikely paying for transfer or transplant in [place]  - Continuing midodrine and octreotide for HRS, has not responded to albumin  - Continuing lactulose and rifaximin for HE  - s/p Vitamin K x3 days  - Para on admit negative for SBP      Decompensated hepatic cirrhosis  See alcoholic cirrhosis      Alcohol dependence in remission  - PETH negative  - reported last drink was few months ago  -continue MVI & Folic acid, add thiamine supplementation      Hyponatremia  Stable.  Secondary to underlying cirrhosis   - holding diuretics  - Oral fluid restriction         Abscess of right arm  S/p I &D at outside hospital  - patient was on Linezolid,  Ceftriaxone added here. Now off abx        Debility  Hx of Guillain barre syndrome - reports not fully recovered, left with chronic debility   -PT/OT consults.       Symptomatic anemia  Resolved, Required PRBC transfusion prior to transfer.   -trend daily CBC  -no active GI bleeding.         VTE Risk Mitigation (From admission, onward)         Ordered     Place sequential compression device  Until discontinued         10/22/22 2124                Discharge Planning   YARITZA: 11/1/2022     Code Status: Full Code   Is the patient medically ready for discharge?: No    Reason for patient still in hospital (select all that apply): Patient trending condition  Discharge Plan A: Home with  family                  Amando Knight MD  Department of Hospital Medicine   [facility] - Transplant Stepdown

## 2022-10-30 NOTE — NURSING
RN received a call from [facility] Transport arranged by pt's sister requesting     - Negative COVID within last 5 days (RN swabbing patient for result to be available)  - Copy of patient's MAR  - Face sheet    Requested to be Faxed to [phone]    Pt transport set up currently for 11/1/22 at [facility] also requesting pt's meds to be given to patient during transport. Dr Lee made aware.    Medical Transport contact phone number: [phone]

## 2022-10-30 NOTE — ASSESSMENT & PLAN NOTE
Worsening  Patient with acute kidney injury likely due to HRS vs bile cast nephropathy vs ATN KATHE is currently worsening. Labs reviewed- Renal function/electrolytes with Estimated Creatinine Clearance: 16.9 mL/min (A) (based on SCr of 6.4 mg/dL (H)). according to latest data. Monitor urine output and serial BMP and adjust therapy as needed. Avoid nephrotoxins and renally dose meds for GFR listed above.   - No emergent needs for HD at this time, K+ remains stable but BUN/Cr rising  - Nephrology following  - Cont midodrine, octreotide, albumin  - Continuing sodium bicarb for acidosis 2/2 renal function

## 2022-10-30 NOTE — PLAN OF CARE
Pt AAO x 4 throughout shift but at times delayed in responses. Pt up with 2 assistance to bedside commode. Pt free from falls and injury. Pt to be transported with medical transport set up by [facility] to [place] 11/1 at 7pm. MD robison.

## 2022-10-31 LAB
ALBUMIN FLD-MCNC: 1.5 G/DL
ALBUMIN SERPL BCP-MCNC: 3.3 G/DL (ref 3.5–5.2)
ALP SERPL-CCNC: 186 U/L (ref 55–135)
ALT SERPL W/O P-5'-P-CCNC: 23 U/L (ref 10–44)
ANION GAP SERPL CALC-SCNC: 19 MMOL/L (ref 8–16)
APPEARANCE FLD: CLEAR
AST SERPL-CCNC: 52 U/L (ref 10–40)
BACTERIA SPEC ANAEROBE CULT: NORMAL
BASOPHILS # BLD AUTO: 0.07 K/UL (ref 0–0.2)
BASOPHILS NFR BLD: 0.5 % (ref 0–1.9)
BILIRUB SERPL-MCNC: 12.8 MG/DL (ref 0.1–1)
BODY FLD TYPE: NORMAL
BODY FLUID SOURCE, LDH: NORMAL
BUN SERPL-MCNC: 67 MG/DL (ref 6–20)
CALCIUM SERPL-MCNC: 8.6 MG/DL (ref 8.7–10.5)
CHLORIDE SERPL-SCNC: 94 MMOL/L (ref 95–110)
CO2 SERPL-SCNC: 18 MMOL/L (ref 23–29)
COLOR FLD: NORMAL
CREAT SERPL-MCNC: 7.2 MG/DL (ref 0.5–1.4)
DIFFERENTIAL METHOD: ABNORMAL
EOSINOPHIL # BLD AUTO: 0.9 K/UL (ref 0–0.5)
EOSINOPHIL NFR BLD: 5.7 % (ref 0–8)
ERYTHROCYTE [DISTWIDTH] IN BLOOD BY AUTOMATED COUNT: 18.6 % (ref 11.5–14.5)
EST. GFR  (NO RACE VARIABLE): 8.6 ML/MIN/1.73 M^2
GLUCOSE SERPL-MCNC: 159 MG/DL (ref 70–110)
GRAM STN SPEC: NORMAL
GRAM STN SPEC: NORMAL
HCT VFR BLD AUTO: 23.7 % (ref 40–54)
HGB BLD-MCNC: 8.1 G/DL (ref 14–18)
IMM GRANULOCYTES # BLD AUTO: 0.09 K/UL (ref 0–0.04)
IMM GRANULOCYTES NFR BLD AUTO: 0.6 % (ref 0–0.5)
INR PPP: 1.8 (ref 0.8–1.2)
LDH FLD L TO P-CCNC: 87 U/L
LYMPHOCYTES # BLD AUTO: 2.5 K/UL (ref 1–4.8)
LYMPHOCYTES NFR BLD: 16.4 % (ref 18–48)
LYMPHOCYTES NFR FLD MANUAL: 21 %
MCH RBC QN AUTO: 34.8 PG (ref 27–31)
MCHC RBC AUTO-ENTMCNC: 34.2 G/DL (ref 32–36)
MCV RBC AUTO: 102 FL (ref 82–98)
MESOTHL CELL NFR FLD MANUAL: 10 %
MONOCYTES # BLD AUTO: 1.3 K/UL (ref 0.3–1)
MONOCYTES NFR BLD: 8.2 % (ref 4–15)
MONOS+MACROS NFR FLD MANUAL: 48 %
NEUTROPHILS # BLD AUTO: 10.4 K/UL (ref 1.8–7.7)
NEUTROPHILS NFR BLD: 68.6 % (ref 38–73)
NEUTROPHILS NFR FLD MANUAL: 21 %
NRBC BLD-RTO: 0 /100 WBC
PLATELET # BLD AUTO: 54 K/UL (ref 150–450)
PMV BLD AUTO: 13 FL (ref 9.2–12.9)
POTASSIUM SERPL-SCNC: 3.6 MMOL/L (ref 3.5–5.1)
PROT FLD-MCNC: 2.2 G/DL
PROT SERPL-MCNC: 5.7 G/DL (ref 6–8.4)
PROTHROMBIN TIME: 17.9 SEC (ref 9–12.5)
RBC # BLD AUTO: 2.33 M/UL (ref 4.6–6.2)
SODIUM SERPL-SCNC: 131 MMOL/L (ref 136–145)
SPECIMEN SOURCE: NORMAL
SPECIMEN SOURCE: NORMAL
WBC # BLD AUTO: 15.16 K/UL (ref 3.9–12.7)
WBC # FLD: 87 /CU MM

## 2022-10-31 PROCEDURE — 20600001 HC STEP DOWN PRIVATE ROOM

## 2022-10-31 PROCEDURE — 25000003 PHARM REV CODE 250: Performed by: HOSPITALIST

## 2022-10-31 PROCEDURE — P9047 ALBUMIN (HUMAN), 25%, 50ML: HCPCS | Mod: JG | Performed by: STUDENT IN AN ORGANIZED HEALTH CARE EDUCATION/TRAINING PROGRAM

## 2022-10-31 PROCEDURE — 85025 COMPLETE CBC W/AUTO DIFF WBC: CPT | Performed by: HOSPITALIST

## 2022-10-31 PROCEDURE — 97535 SELF CARE MNGMENT TRAINING: CPT

## 2022-10-31 PROCEDURE — 63600175 PHARM REV CODE 636 W HCPCS: Mod: JB | Performed by: HOSPITALIST

## 2022-10-31 PROCEDURE — 97110 THERAPEUTIC EXERCISES: CPT

## 2022-10-31 PROCEDURE — 85610 PROTHROMBIN TIME: CPT | Performed by: HOSPITALIST

## 2022-10-31 PROCEDURE — 89051 BODY FLUID CELL COUNT: CPT | Performed by: STUDENT IN AN ORGANIZED HEALTH CARE EDUCATION/TRAINING PROGRAM

## 2022-10-31 PROCEDURE — 25000003 PHARM REV CODE 250: Performed by: STUDENT IN AN ORGANIZED HEALTH CARE EDUCATION/TRAINING PROGRAM

## 2022-10-31 PROCEDURE — 83615 LACTATE (LD) (LDH) ENZYME: CPT | Performed by: STUDENT IN AN ORGANIZED HEALTH CARE EDUCATION/TRAINING PROGRAM

## 2022-10-31 PROCEDURE — 87075 CULTR BACTERIA EXCEPT BLOOD: CPT | Performed by: STUDENT IN AN ORGANIZED HEALTH CARE EDUCATION/TRAINING PROGRAM

## 2022-10-31 PROCEDURE — 80053 COMPREHEN METABOLIC PANEL: CPT | Performed by: HOSPITALIST

## 2022-10-31 PROCEDURE — 84157 ASSAY OF PROTEIN OTHER: CPT | Performed by: STUDENT IN AN ORGANIZED HEALTH CARE EDUCATION/TRAINING PROGRAM

## 2022-10-31 PROCEDURE — 63600175 PHARM REV CODE 636 W HCPCS: Mod: JG | Performed by: STUDENT IN AN ORGANIZED HEALTH CARE EDUCATION/TRAINING PROGRAM

## 2022-10-31 PROCEDURE — 99232 PR SUBSEQUENT HOSPITAL CARE,LEVL II: ICD-10-PCS | Mod: ,,, | Performed by: STUDENT IN AN ORGANIZED HEALTH CARE EDUCATION/TRAINING PROGRAM

## 2022-10-31 PROCEDURE — 87070 CULTURE OTHR SPECIMN AEROBIC: CPT | Performed by: STUDENT IN AN ORGANIZED HEALTH CARE EDUCATION/TRAINING PROGRAM

## 2022-10-31 PROCEDURE — 99232 SBSQ HOSP IP/OBS MODERATE 35: CPT | Mod: ,,, | Performed by: STUDENT IN AN ORGANIZED HEALTH CARE EDUCATION/TRAINING PROGRAM

## 2022-10-31 PROCEDURE — 87205 SMEAR GRAM STAIN: CPT | Performed by: STUDENT IN AN ORGANIZED HEALTH CARE EDUCATION/TRAINING PROGRAM

## 2022-10-31 PROCEDURE — 99233 PR SUBSEQUENT HOSPITAL CARE,LEVL III: ICD-10-PCS | Mod: ,,, | Performed by: INTERNAL MEDICINE

## 2022-10-31 PROCEDURE — 99233 SBSQ HOSP IP/OBS HIGH 50: CPT | Mod: ,,, | Performed by: INTERNAL MEDICINE

## 2022-10-31 PROCEDURE — 36415 COLL VENOUS BLD VENIPUNCTURE: CPT | Performed by: HOSPITALIST

## 2022-10-31 PROCEDURE — 82042 OTHER SOURCE ALBUMIN QUAN EA: CPT | Performed by: STUDENT IN AN ORGANIZED HEALTH CARE EDUCATION/TRAINING PROGRAM

## 2022-10-31 RX ORDER — LANOLIN ALCOHOL/MO/W.PET/CERES
100 CREAM (GRAM) TOPICAL DAILY
Qty: 30 TABLET | Refills: 2 | Status: SHIPPED | OUTPATIENT
Start: 2022-11-01 | End: 2023-01-30

## 2022-10-31 RX ORDER — ONDANSETRON 4 MG/1
8 TABLET, FILM COATED ORAL EVERY 12 HOURS PRN
Qty: 30 TABLET | Refills: 0 | Status: SHIPPED | OUTPATIENT
Start: 2022-10-31

## 2022-10-31 RX ORDER — MICONAZOLE NITRATE 2 %
POWDER (GRAM) TOPICAL 2 TIMES DAILY
Qty: 85 G | Refills: 0 | Status: SHIPPED | OUTPATIENT
Start: 2022-10-31

## 2022-10-31 RX ORDER — MIDODRINE HYDROCHLORIDE 5 MG/1
15 TABLET ORAL EVERY 8 HOURS
Qty: 270 TABLET | Refills: 11 | Status: SHIPPED | OUTPATIENT
Start: 2022-10-31 | End: 2023-10-31

## 2022-10-31 RX ORDER — ALBUMIN HUMAN 250 G/1000ML
SOLUTION INTRAVENOUS
Status: DISPENSED
Start: 2022-10-31 | End: 2022-11-01

## 2022-10-31 RX ORDER — PREGABALIN 75 MG/1
75 CAPSULE ORAL DAILY
Status: DISCONTINUED | OUTPATIENT
Start: 2022-11-01 | End: 2022-11-01

## 2022-10-31 RX ORDER — SODIUM BICARBONATE 650 MG/1
1300 TABLET ORAL 3 TIMES DAILY
Qty: 180 TABLET | Refills: 11 | Status: SHIPPED | OUTPATIENT
Start: 2022-10-31 | End: 2023-10-31

## 2022-10-31 RX ORDER — ALBUMIN HUMAN 250 G/1000ML
SOLUTION INTRAVENOUS
Status: COMPLETED | OUTPATIENT
Start: 2022-10-31 | End: 2022-10-31

## 2022-10-31 RX ORDER — PREGABALIN 75 MG/1
75 CAPSULE ORAL DAILY
Qty: 30 CAPSULE | Refills: 0 | Status: SHIPPED | OUTPATIENT
Start: 2022-11-01 | End: 2022-11-01 | Stop reason: HOSPADM

## 2022-10-31 RX ADMIN — MIDODRINE HYDROCHLORIDE 15 MG: 5 TABLET ORAL at 06:10

## 2022-10-31 RX ADMIN — LACTULOSE 10 G: 20 SOLUTION ORAL at 08:10

## 2022-10-31 RX ADMIN — SODIUM BICARBONATE 650 MG TABLET 1300 MG: at 08:10

## 2022-10-31 RX ADMIN — PREGABALIN 75 MG: 75 CAPSULE ORAL at 08:10

## 2022-10-31 RX ADMIN — OCTREOTIDE ACETATE 100 MCG: 100 INJECTION, SOLUTION INTRAVENOUS; SUBCUTANEOUS at 06:10

## 2022-10-31 RX ADMIN — OCTREOTIDE ACETATE 100 MCG: 100 INJECTION, SOLUTION INTRAVENOUS; SUBCUTANEOUS at 03:10

## 2022-10-31 RX ADMIN — MIDODRINE HYDROCHLORIDE 15 MG: 5 TABLET ORAL at 03:10

## 2022-10-31 RX ADMIN — FOLIC ACID 1000 MCG: 1 TABLET ORAL at 08:10

## 2022-10-31 RX ADMIN — MIDODRINE HYDROCHLORIDE 15 MG: 5 TABLET ORAL at 08:10

## 2022-10-31 RX ADMIN — Medication 100 MG: at 08:10

## 2022-10-31 RX ADMIN — Medication 1 CAPSULE: at 08:10

## 2022-10-31 RX ADMIN — OCTREOTIDE ACETATE 100 MCG: 100 INJECTION, SOLUTION INTRAVENOUS; SUBCUTANEOUS at 08:10

## 2022-10-31 RX ADMIN — SODIUM BICARBONATE 650 MG TABLET 1300 MG: at 03:10

## 2022-10-31 RX ADMIN — ALBUMIN (HUMAN) 37.5 G: 25 SOLUTION INTRAVENOUS at 02:10

## 2022-10-31 NOTE — PROGRESS NOTES
"[facility] - Transplant Stepdown  Nephrology  Progress Note    Patient Name: Marshall Corona  MRN: [id number]  Admission Date: 10/22/2022  Hospital Length of Stay: 9 days  Attending Provider: Amando Knight MD   Primary Care Physician: Primary Doctor No  Principal Problem:KATHE (acute kidney injury)    Subjective:     HPI: Marshall Corona is a 47-year-old male with hypertension, diabetes, alcoholic cirrhosis who presented to OSH with weakness and abdominal distention. Patient was subsequently transferred to [facility] for liver transplant evaluation.  During hospitalization at OSH patient noted to have right axillary abscess which was drained and he was subsequently started on clindamycin and transitioned to linezolid.  Additionally patient with worsening renal function during admission with creatinine of 1.4 on admit with subsequent worsening up to 3.8 at the time of transfer.  Outside records noting worsening renal function following paracentesis and associated hypotension for which he received albumin and was started on midodrine.  Nephrology was consulted for the management of KATHE.      Interval History: NAEON. Patient continues to have worsening renal function. Anuric x72 hours. Worsening abdominal pain and distension. Will need RRT in the near future. Patient pending dispo and transport to [place] tomorrow. If patient ends up staying will plan for line placement and HD.     Review of patient's allergies indicates:   Allergen Reactions    Codeine Itching, Anaphylaxis and Hives    Hydrocodone-acetaminophen      "Agitation"    Influenza virus vaccines Other (See Comments)    Oxycodone-acetaminophen      "Agitation"     Current Facility-Administered Medications   Medication Frequency    acetaminophen tablet 650 mg Q8H PRN    albumin human 25% 25 % bottle     dextrose 10% bolus 125 mL PRN    dextrose 10% bolus 250 mL PRN    folic acid tablet 1,000 mcg Daily    Lactobacillus rhamnosus GG capsule 1 capsule Daily    " lactulose 20 gram/30 mL solution Soln 10 g BID    miconazole NITRATE 2 % top powder BID    miconazole nitrate 2% ointment PRN    midodrine tablet 15 mg Q8H    octreotide injection 100 mcg Q8H    ondansetron injection 4 mg Q6H PRN    [START ON 11/1/2022] pregabalin capsule 75 mg Daily    prochlorperazine injection Soln 5 mg Q6H PRN    sodium bicarbonate tablet 1,300 mg TID    thiamine tablet 100 mg Daily       Objective:     Vital Signs (Most Recent):  Temp: 97.6 °F (36.4 °C) (10/31/22 1154)  Pulse: 98 (10/31/22 1425)  Resp: 16 (10/31/22 1425)  BP: 102/63 (10/31/22 1425)  SpO2: 96 % (10/31/22 1425)  O2 Device (Oxygen Therapy): room air (10/31/22 0712) Vital Signs (24h Range):  Temp:  [97.6 °F (36.4 °C)-97.9 °F (36.6 °C)] 97.6 °F (36.4 °C)  Pulse:  [] 98  Resp:  [16-18] 16  SpO2:  [92 %-98 %] 96 %  BP: (100-126)/(53-81) 102/63     Weight: 97.1 kg (214 lb 1.1 oz) (10/29/22 0545)  Body mass index is 29.03 kg/m².  Body surface area is 2.22 meters squared.    I/O last 3 completed shifts:  In: 1800 [P.O.:1800]  Out: -     Physical Exam  Constitutional:       Appearance: Normal appearance.   Eyes:      General: Scleral icterus present.      Conjunctiva/sclera: Conjunctivae normal.   Cardiovascular:      Rate and Rhythm: Normal rate and regular rhythm.      Pulses: Normal pulses.      Heart sounds: Normal heart sounds.   Pulmonary:      Effort: Pulmonary effort is normal. No respiratory distress.      Breath sounds: Normal breath sounds.   Abdominal:      General: Bowel sounds are normal. There is distension (worse today).      Palpations: Abdomen is soft.      Tenderness: There is abdominal tenderness.   Musculoskeletal:      Right lower leg: Edema (trace pitting) present.      Left lower leg: Edema (trace pitting) present.   Skin:     General: Skin is warm and dry.      Coloration: Skin is jaundiced.      Findings: No bruising.   Neurological:      Mental Status: He is alert and oriented to person, place,  and time.       Significant Labs:  CBC:   Recent Labs   Lab 10/31/22  0706   WBC 15.16*   RBC 2.33*   HGB 8.1*   HCT 23.7*   PLT 54*   *   MCH 34.8*   MCHC 34.2       CMP:   Recent Labs   Lab 10/31/22  0706   *   CALCIUM 8.6*   ALBUMIN 3.3*   PROT 5.7*   *   K 3.6   CO2 18*   CL 94*   BUN 67*   CREATININE 7.2*   ALKPHOS 186*   ALT 23   AST 52*   BILITOT 12.8*       All labs within the past 24 hours have been reviewed.     Significant Imaging:       Assessment/Plan:     * KATHE (acute kidney injury)  Patient with baseline creatinine of 1.0 which worsened to creatinine of 1.4 at the time of admission to OSH and subsequently 3.8 upon transfer to [facility].  Notably, creatinine 2.4 upon repeat so suspect 3.8 was erroneous. In the setting of cirrhosis, there was some concern for hepatorenal syndrome so patient was started on albumin and midodrine.  Urine sodium (<10) consistent with hepatorenal syndrome but patient with robust blood pressures so less likely.  Lower suspicion for abdominal compartment syndrome given recent paracentesis.  Bilirubin elevated.  Suspect multifactorial including HRS c/b ATN v bilirubin tubulopathy.     Patient with steadily worsening renal function during admission. Anuric since 10/28. Cr 7.2 on 10/31. No significant acidosis, electrolyte abnormality noted, though patient with worsening abdominal pain and distension s/o ascites. Anticipate he will need RRT in the near future. Patient planning on discharging to [facility] via medical transport tomorrow 11/1. If patient does not discharge, will plan for line placement and HD.     Recommendations:   - Line placement for HD tomorrow if patient does not discharge   - okay to continue HRS regimen at this time  - s/p albumin protocol for volume expansion  - strict intake and output  - maintain map > 85 if treating for HRS  - renally dose medications and avoid nephrotoxins when possible          Thank you for your consult. I will  follow-up with patient. Please contact us if you have any additional questions.    Duy Hdz MD  Nephrology  [facility] - Transplant Stepdown

## 2022-10-31 NOTE — SUBJECTIVE & OBJECTIVE
"Interval History: NAEON. Patient continues to have worsening renal function. Anuric x72 hours. Worsening abdominal pain and distension. Will need RRT in the near future. Patient pending dispo and transport to [place] tomorrow. If patient ends up staying will plan for line placement and HD.     Review of patient's allergies indicates:   Allergen Reactions    Codeine Itching, Anaphylaxis and Hives    Hydrocodone-acetaminophen      "Agitation"    Influenza virus vaccines Other (See Comments)    Oxycodone-acetaminophen      "Agitation"     Current Facility-Administered Medications   Medication Frequency    acetaminophen tablet 650 mg Q8H PRN    albumin human 25% 25 % bottle     dextrose 10% bolus 125 mL PRN    dextrose 10% bolus 250 mL PRN    folic acid tablet 1,000 mcg Daily    Lactobacillus rhamnosus GG capsule 1 capsule Daily    lactulose 20 gram/30 mL solution Soln 10 g BID    miconazole NITRATE 2 % top powder BID    miconazole nitrate 2% ointment PRN    midodrine tablet 15 mg Q8H    octreotide injection 100 mcg Q8H    ondansetron injection 4 mg Q6H PRN    [START ON 11/1/2022] pregabalin capsule 75 mg Daily    prochlorperazine injection Soln 5 mg Q6H PRN    sodium bicarbonate tablet 1,300 mg TID    thiamine tablet 100 mg Daily       Objective:     Vital Signs (Most Recent):  Temp: 97.6 °F (36.4 °C) (10/31/22 1154)  Pulse: 98 (10/31/22 1425)  Resp: 16 (10/31/22 1425)  BP: 102/63 (10/31/22 1425)  SpO2: 96 % (10/31/22 1425)  O2 Device (Oxygen Therapy): room air (10/31/22 0712) Vital Signs (24h Range):  Temp:  [97.6 °F (36.4 °C)-97.9 °F (36.6 °C)] 97.6 °F (36.4 °C)  Pulse:  [] 98  Resp:  [16-18] 16  SpO2:  [92 %-98 %] 96 %  BP: (100-126)/(53-81) 102/63     Weight: 97.1 kg (214 lb 1.1 oz) (10/29/22 0545)  Body mass index is 29.03 kg/m².  Body surface area is 2.22 meters squared.    I/O last 3 completed shifts:  In: 1800 [P.O.:1800]  Out: -     Physical Exam  Constitutional:       Appearance: Normal appearance. "   Eyes:      General: Scleral icterus present.      Conjunctiva/sclera: Conjunctivae normal.   Cardiovascular:      Rate and Rhythm: Normal rate and regular rhythm.      Pulses: Normal pulses.      Heart sounds: Normal heart sounds.   Pulmonary:      Effort: Pulmonary effort is normal. No respiratory distress.      Breath sounds: Normal breath sounds.   Abdominal:      General: Bowel sounds are normal. There is distension (worse today).      Palpations: Abdomen is soft.      Tenderness: There is abdominal tenderness.   Musculoskeletal:      Right lower leg: Edema (trace pitting) present.      Left lower leg: Edema (trace pitting) present.   Skin:     General: Skin is warm and dry.      Coloration: Skin is jaundiced.      Findings: No bruising.   Neurological:      Mental Status: He is alert and oriented to person, place, and time.       Significant Labs:  CBC:   Recent Labs   Lab 10/31/22  0706   WBC 15.16*   RBC 2.33*   HGB 8.1*   HCT 23.7*   PLT 54*   *   MCH 34.8*   MCHC 34.2       CMP:   Recent Labs   Lab 10/31/22  0706   *   CALCIUM 8.6*   ALBUMIN 3.3*   PROT 5.7*   *   K 3.6   CO2 18*   CL 94*   BUN 67*   CREATININE 7.2*   ALKPHOS 186*   ALT 23   AST 52*   BILITOT 12.8*       All labs within the past 24 hours have been reviewed.     Significant Imaging:

## 2022-10-31 NOTE — PLAN OF CARE
Problem: Occupational Therapy  Goal: Occupational Therapy Goal  Description: Goals to be met by: 10/24/22     Patient will increase functional independence with ADLs by performing:    UE Dressing with Modified [place].  LE Dressing with Modified [place].  Grooming while standing at sink with Modified [place].  Toileting from toilet with Modified [place] for hygiene and clothing management.   Toilet transfer to toilet with Modified [place].    Outcome: Ongoing, Progressing

## 2022-10-31 NOTE — PT/OT/SLP PROGRESS
Physical Therapy  Continue Current Plan of Care     Patient Name:  Marshall Corona   MRN:  [id number]  Admitting Diagnosis:  KATHE (acute kidney injury)   Recent Surgery: * No surgery found *    Admit Date: 10/22/2022  Length of Stay: 9 days    Patient not seen on this date, however per chart review pt continues to benefit from acute PT services. PT to follow up as POC allows. Please continue progressive mobility as appropriate.    Discharge Disposition Recommendation: Rehab    MARLY Marie  10/31/2022  Pager: [phone]

## 2022-10-31 NOTE — PROCEDURES
Radiology Post-Procedure Note    Pre Op Diagnosis: Ascites  Post Op Diagnosis: Same    Procedure: Ultrasound Guided Paracentesis    Procedure performed by: Shelia Jaramillo PA-C    Written Informed Consent Obtained: Yes  Specimen Removed: YES Yellow, Clear  Estimated Blood Loss: Minimal    Findings:   Successful paracentesis.  Albumin administered PRN per protocol.    Patient tolerated procedure well.    Shelia Jaramillo PA-C  Interventional Radiology  [phone]

## 2022-10-31 NOTE — ASSESSMENT & PLAN NOTE
Secondary to decompensated liver disease that is not improving.   - On midodrine 15mg TID  - No obvious source of ongoing infection, previously treated for abscess of axilla.   - Currently hemodynamically stable

## 2022-10-31 NOTE — SUBJECTIVE & OBJECTIVE
Interval History: No acute events overnight. Patient went for paracentesis today with 7.2L removed. Current plans are for patient to go home via medical transportation set up by family for him to come to [place] as he has no family here. He and family are aware that he will need HD soon but they want to make that decision one he arrives in [place].    Review of Systems   Constitutional:  Negative for chills and fever.   HENT:  Negative for congestion and sore throat.    Eyes:  Negative for photophobia and visual disturbance.   Respiratory:  Negative for cough and shortness of breath.    Cardiovascular:  Negative for chest pain and palpitations.   Gastrointestinal:  Positive for abdominal distention. Negative for abdominal pain, blood in stool, constipation, diarrhea, nausea and vomiting.   Endocrine: Negative for cold intolerance and heat intolerance.   Genitourinary:  Negative for difficulty urinating, dysuria and hematuria.   Musculoskeletal:  Negative for arthralgias and myalgias.   Skin:  Positive for color change (jaundice). Negative for rash and wound.   Allergic/Immunologic: Negative for environmental allergies and food allergies.   Neurological:  Negative for seizures and numbness.   Hematological:  Negative for adenopathy. Does not bruise/bleed easily.   Psychiatric/Behavioral:  Negative for hallucinations and suicidal ideas.    Objective:     Vital Signs (Most Recent):  Temp: 97.6 °F (36.4 °C) (10/31/22 1154)  Pulse: 97 (10/31/22 1243)  Resp: 16 (10/31/22 1243)  BP: 105/61 (10/31/22 1243)  SpO2: 97 % (10/31/22 1243) Vital Signs (24h Range):  Temp:  [97.6 °F (36.4 °C)-97.9 °F (36.6 °C)] 97.6 °F (36.4 °C)  Pulse:  [] 97  Resp:  [16-18] 16  SpO2:  [92 %-98 %] 97 %  BP: (100-126)/(53-81) 105/61     Weight: 97.1 kg (214 lb 1.1 oz)  Body mass index is 29.03 kg/m².    Intake/Output Summary (Last 24 hours) at 10/31/2022 1323  Last data filed at 10/31/2022 1204  Gross per 24 hour   Intake 2040 ml   Output --    Net 2040 ml        Physical Exam  Constitutional:       Appearance: He is well-developed. He is ill-appearing. He is not toxic-appearing.   HENT:      Head: Normocephalic and atraumatic.   Eyes:      General: Scleral icterus present.      Conjunctiva/sclera: Conjunctivae normal.      Pupils: Pupils are equal, round, and reactive to light.   Neck:      Thyroid: No thyromegaly.      Vascular: No JVD.   Cardiovascular:      Rate and Rhythm: Normal rate and regular rhythm.      Heart sounds: Normal heart sounds. No murmur heard.    No friction rub. No gallop.   Pulmonary:      Effort: Pulmonary effort is normal. No respiratory distress.      Breath sounds: Normal breath sounds. No wheezing or rales.   Abdominal:      General: Bowel sounds are normal. There is distension.      Palpations: Abdomen is soft. There is no mass.      Tenderness: There is no abdominal tenderness. There is no guarding or rebound.      Hernia: No hernia is present.   Musculoskeletal:         General: No deformity. Normal range of motion.      Cervical back: Normal range of motion and neck supple.   Skin:     General: Skin is warm and dry.      Coloration: Skin is jaundiced.   Neurological:      Mental Status: He is alert and oriented to person, place, and time.      Cranial Nerves: No cranial nerve deficit.   Psychiatric:         Behavior: Behavior normal.       Significant Labs: All pertinent labs within the past 24 hours have been reviewed.  CBC:   Recent Labs   Lab 10/30/22  0734 10/31/22  0706   WBC 15.41* 15.16*   HGB 7.9* 8.1*   HCT 23.3* 23.7*   PLT 65* 54*       CMP:   Recent Labs   Lab 10/30/22  0734 10/31/22  0706   * 131*   K 3.6 3.6   CL 94* 94*   CO2 19* 18*   * 159*   BUN 68* 67*   CREATININE 6.4* 7.2*   CALCIUM 8.9 8.6*   PROT 6.0 5.7*   ALBUMIN 3.5 3.3*   BILITOT 13.1* 12.8*   ALKPHOS 160* 186*   AST 50* 52*   ALT 21 23   ANIONGAP 16 19*         Significant Imaging: I have reviewed all pertinent imaging  results/findings within the past 24 hours.

## 2022-10-31 NOTE — PROGRESS NOTES
AAOx4. VSS. Patient up w/ 2x assist to BSC. Patient had 1 BM so far this shift. Patient oliguric - makes minimal nasreen urine - nephrology following. Cr 7.2 (increased from 6.4 prior) on AM labs. Wound care done to R axillary - cleaned w/ wound cleanser, apply aquacel ag rope, and cover w/ mepilex foam dressing. Para done today 10/31 - 7.2L removed. Bed in low position. Non-skid socks on. Call light within reach. Plan to D/C to home tomorrow in [place] - transportation set up by patient's sister.

## 2022-10-31 NOTE — ASSESSMENT & PLAN NOTE
MELD-Na score: 37 at 10/31/2022  7:06 AM  MELD score: 36 at 10/31/2022  7:06 AM  Calculated from:  Serum Creatinine: 7.2 mg/dL (Using max of 4 mg/dL) at 10/31/2022  7:06 AM  Serum Sodium: 131 mmol/L at 10/31/2022  7:06 AM  Total Bilirubin: 12.8 mg/dL at 10/31/2022  7:06 AM  INR(ratio): 1.8 at 10/31/2022  7:06 AM  Age: 47 years  Not a transplant evaluation candidate given limited sobriety and lack of family in [place]. Patient would like to transfer to [facility] however he realizes the barriers before him - lack of [place] medicaid and [place] medicaid unlikely paying for transfer or transplant in [place]  - Continuing midodrine and octreotide for HRS, has not responded to albumin  - Continuing lactulose and rifaximin for HE  - s/p Vitamin K x3 days  - Para on admit negative for SBP

## 2022-10-31 NOTE — ASSESSMENT & PLAN NOTE
Worsening  Patient with acute kidney injury likely due to HRS vs bile cast nephropathy vs ATN KATHE is currently worsening. Labs reviewed- Renal function/electrolytes with Estimated Creatinine Clearance: 15 mL/min (A) (based on SCr of 7.2 mg/dL (H)). according to latest data. Monitor urine output and serial BMP and adjust therapy as needed. Avoid nephrotoxins and renally dose meds for GFR listed above.   - No emergent needs for HD at this time, K+ remains stable but BUN/Cr rising. Per Nephro, will need HD in the coming days but they are aware of patient wanting to go home with family via medical transportation to [place] and that he has not committed to HD at this time but may at a later time.   - Nephrology following  - Cont midodrine, octreotide while admitted.  - Continuing sodium bicarb for acidosis 2/2 renal function

## 2022-10-31 NOTE — PROGRESS NOTES
[facility] - Transplant [facility] Medicine  Progress Note    Patient Name: Marshall Corona  MRN: [id number]  Patient Class: IP- Inpatient   Admission Date: 10/22/2022  Length of Stay: 9 days  Attending Physician: Amando Knight MD  Primary Care Provider: Primary Doctor No        Subjective:     Principal Problem:KATHE (acute kidney injury)        HPI:  TRANSFER CENTER  PHYSICIAN SUMMARY  47-year-old m with PMH HTN, GBS, diabetes, presumed alcoholic cirrhosis admitted to [facility] on 10/15 with weakness and abd distension.  Patient reports that his last drink was 2 months ago.     On admission VS unremarkable.  PEx pos for scleral icterus and abd distension with no tenderness.  He was AOX3.  WBC 12.9, Hb 7.2, Plts 142, INR 1.6 > 1. Na 132, K 3.7, CO2 21, BUN 50, Cr 1.41 (BL 0.91), bilirubin 8.8, AST 68, ALT 37, HV A/B/C neg.  COVID neg.       CT abd pos for cirrhosis with moderate volume ascites.  Also, 1.4 x 1 cm nonspecific hypoattenuating lesion in near the splenic hilum.  US abdomen pos for cirrhotic liver.  Stasis flow suggestive of portal HTN.  Moderate ascites.       On 10/18 the patient was noted to have a right axillary abscess a/w rising WBC.  The abscess was drained and patient placed on clindamycin > linezolid and CTX.  Cultures NGTD.     Hospitalization a/w increasing renal failure with  Cr 1.41 (10/15) > 1.66 (10/19) > 2 (10/21) > 3.8 (10/22) following a large volume paracentesis on 10/21.  BP fell into the mid 90s briefly following the procedure. Patient was given albumin (25 g x 4 ) and placed on midodrine     Also hosppitalization a/w increasing liver failure: T bili 8.8 > > 10.2, AST 68 > > 88. MELD Na 35     Transfer requested for liver XPL evaluation.  Transfer diagnosis acute liver failure, acute renal failure with concern for hepatorenal syndrome.     BEDSIDE EVAL    At bedside patient reports he feels ok.  Before paracentesis on 10/21, he's had one prior at a another hospital earlier this  month where 4L was removed.  He feels after recent paracentesis he has increased abdominal swelling and pain, feels fluid has reaccumulated faster or he is more bloated.  Reports last alcohol use was a few months ago, states he quit prior to diagnosis of liver disease.     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   \  He uses smokeless tobacco [place].  He denies any hx of Intra-nasal or injection illicit drug use.       Overview/Hospital Course:  Patient with zero family in [place], majority in [place]. Social support and limited sobriety precluding him from transplant evaluation here. Patient's family organizing transportation for him to be in [place] with them at home. He and family are aware that his renal function continues to decline and that he likely will require dialysis soon, but they want to either pursue hospice care or HD when he arrives at his sister's house and they discuss it further. Furthermore, he must be a resident of [place] to be eligible for [place] medicaid which his sister has been working on arranging. Patient otherwise remains hemodynamically stable but is still very ill with MELD of 36      Interval History: No acute events overnight. Patient went for paracentesis today with 7.2L removed. Current plans are for patient to go home via medical transportation set up by family for him to come to [place] as he has no family here. He and family are aware that he will need HD soon but they want to make that decision one he arrives in [place].    Review of Systems   Constitutional:  Negative for chills and fever.   HENT:  Negative for congestion and sore throat.    Eyes:  Negative for photophobia and visual disturbance.    Respiratory:  Negative for cough and shortness of breath.    Cardiovascular:  Negative for chest pain and palpitations.   Gastrointestinal:  Positive for abdominal distention. Negative for abdominal pain, blood in stool, constipation, diarrhea, nausea and vomiting.   Endocrine: Negative for cold intolerance and heat intolerance.   Genitourinary:  Negative for difficulty urinating, dysuria and hematuria.   Musculoskeletal:  Negative for arthralgias and myalgias.   Skin:  Positive for color change (jaundice). Negative for rash and wound.   Allergic/Immunologic: Negative for environmental allergies and food allergies.   Neurological:  Negative for seizures and numbness.   Hematological:  Negative for adenopathy. Does not bruise/bleed easily.   Psychiatric/Behavioral:  Negative for hallucinations and suicidal ideas.    Objective:     Vital Signs (Most Recent):  Temp: 97.6 °F (36.4 °C) (10/31/22 1154)  Pulse: 97 (10/31/22 1243)  Resp: 16 (10/31/22 1243)  BP: 105/61 (10/31/22 1243)  SpO2: 97 % (10/31/22 1243) Vital Signs (24h Range):  Temp:  [97.6 °F (36.4 °C)-97.9 °F (36.6 °C)] 97.6 °F (36.4 °C)  Pulse:  [] 97  Resp:  [16-18] 16  SpO2:  [92 %-98 %] 97 %  BP: (100-126)/(53-81) 105/61     Weight: 97.1 kg (214 lb 1.1 oz)  Body mass index is 29.03 kg/m².    Intake/Output Summary (Last 24 hours) at 10/31/2022 1323  Last data filed at 10/31/2022 1204  Gross per 24 hour   Intake 2040 ml   Output --   Net 2040 ml        Physical Exam  Constitutional:       Appearance: He is well-developed. He is ill-appearing. He is not toxic-appearing.   HENT:      Head: Normocephalic and atraumatic.   Eyes:      General: Scleral icterus present.      Conjunctiva/sclera: Conjunctivae normal.      Pupils: Pupils are equal, round, and reactive to light.   Neck:      Thyroid: No thyromegaly.      Vascular: No JVD.   Cardiovascular:      Rate and Rhythm: Normal rate and regular rhythm.      Heart sounds: Normal heart sounds. No murmur  heard.    No friction rub. No gallop.   Pulmonary:      Effort: Pulmonary effort is normal. No respiratory distress.      Breath sounds: Normal breath sounds. No wheezing or rales.   Abdominal:      General: Bowel sounds are normal. There is distension.      Palpations: Abdomen is soft. There is no mass.      Tenderness: There is no abdominal tenderness. There is no guarding or rebound.      Hernia: No hernia is present.   Musculoskeletal:         General: No deformity. Normal range of motion.      Cervical back: Normal range of motion and neck supple.   Skin:     General: Skin is warm and dry.      Coloration: Skin is jaundiced.   Neurological:      Mental Status: He is alert and oriented to person, place, and time.      Cranial Nerves: No cranial nerve deficit.   Psychiatric:         Behavior: Behavior normal.       Significant Labs: All pertinent labs within the past 24 hours have been reviewed.  CBC:   Recent Labs   Lab 10/30/22  0734 10/31/22  0706   WBC 15.41* 15.16*   HGB 7.9* 8.1*   HCT 23.3* 23.7*   PLT 65* 54*       CMP:   Recent Labs   Lab 10/30/22  0734 10/31/22  0706   * 131*   K 3.6 3.6   CL 94* 94*   CO2 19* 18*   * 159*   BUN 68* 67*   CREATININE 6.4* 7.2*   CALCIUM 8.9 8.6*   PROT 6.0 5.7*   ALBUMIN 3.5 3.3*   BILITOT 13.1* 12.8*   ALKPHOS 160* 186*   AST 50* 52*   ALT 21 23   ANIONGAP 16 19*         Significant Imaging: I have reviewed all pertinent imaging results/findings within the past 24 hours.      Assessment/Plan:      * KATHE (acute kidney injury)  Worsening  Patient with acute kidney injury likely due to HRS vs bile cast nephropathy vs ATN KATHE is currently worsening. Labs reviewed- Renal function/electrolytes with Estimated Creatinine Clearance: 15 mL/min (A) (based on SCr of 7.2 mg/dL (H)). according to latest data. Monitor urine output and serial BMP and adjust therapy as needed. Avoid nephrotoxins and renally dose meds for GFR listed above.   - No emergent needs for HD at  this time, K+ remains stable but BUN/Cr rising. Per Nephro, will need HD in the coming days but they are aware of patient wanting to go home with family via medical transportation to [place] and that he has not committed to HD at this time but may at a later time.   - Nephrology following  - Cont midodrine, octreotide while admitted.  - Continuing sodium bicarb for acidosis 2/2 renal function    Hypotension  Secondary to decompensated liver disease that is not improving.   - On midodrine 15mg TID  - No obvious source of ongoing infection, previously treated for abscess of axilla.   - Currently hemodynamically stable      Alcoholic cirrhosis  MELD-Na score: 37 at 10/31/2022  7:06 AM  MELD score: 36 at 10/31/2022  7:06 AM  Calculated from:  Serum Creatinine: 7.2 mg/dL (Using max of 4 mg/dL) at 10/31/2022  7:06 AM  Serum Sodium: 131 mmol/L at 10/31/2022  7:06 AM  Total Bilirubin: 12.8 mg/dL at 10/31/2022  7:06 AM  INR(ratio): 1.8 at 10/31/2022  7:06 AM  Age: 47 years  Not a transplant evaluation candidate given limited sobriety and lack of family in [place]. Patient would like to transfer to [facility] however he realizes the barriers before him - lack of [place] medicaid and [place] medicaid unlikely paying for transfer or transplant in [place]  - Continuing midodrine and octreotide for HRS, has not responded to albumin  - Continuing lactulose and rifaximin for HE  - s/p Vitamin K x3 days  - Para on admit negative for SBP      Decompensated hepatic cirrhosis  See alcoholic cirrhosis      Alcohol dependence in remission  - PETH negative  - reported last drink was few months ago  -continue MVI & Folic acid, add thiamine supplementation      Hyponatremia  Stable.  Secondary to underlying cirrhosis   - holding diuretics  - Oral fluid restriction         Abscess of right arm  S/p I &D at outside hospital  - patient was on Linezolid,  Ceftriaxone added here. Now off abx        Debility  Hx of  Guillain [place]re syndrome - reports not fully recovered, left with chronic debility   -PT/OT consults.       Symptomatic anemia  Resolved, Required PRBC transfusion prior to transfer.   -trend daily CBC  -no active GI bleeding.         VTE Risk Mitigation (From admission, onward)         Ordered     Place sequential compression device  Until discontinued         10/22/22 2124                Discharge Planning   YARITZA: 11/1/2022     Code Status: Full Code   Is the patient medically ready for discharge?: No    Reason for patient still in hospital (select all that apply): Patient trending condition  Discharge Plan A: Home with family                  Amando Knight MD  Department of Hospital Medicine   [facility] - Transplant Stepdown

## 2022-10-31 NOTE — H&P
Inpatient Radiology Pre-procedure Note    History of Present Illness:  Marshall Corona is a 47 y.o. male who presents for US guided Paracentesis.    Admission H&P reviewed.  Past Medical History:   Diagnosis Date    Alcohol abuse 10/23/2022    Allergy     Guillain[place] syndrome     Hypertension     Peripheral venous insufficiency 6/21/2021    Pulmonary hypertension 8/3/2021    Thrombosis of left saphenous vein 11/26/2019    Uncomplicated alcohol dependence 10/28/2019    Vitamin B12 deficiency (non anemic) 4/30/2020     Past Surgical History:   Procedure Laterality Date    ARTHROSCOPY OF KNEE  2001    Right knee scope    EYE SURGERY      bilateral lasic    INCISION AND DRAINAGE OF ABSCESS Right 10/18/2022    Procedure: INCISION AND DRAINAGE, ABSCESS-AXILLA;  Surgeon: Geovanni Lee MD;  Location: [facility] OR;  Service: General;  Laterality: Right;       Review of Systems:   As documented in primary team H&P    Home Meds:   Prior to Admission medications    Medication Sig Start Date End Date Taking? Authorizing Provider   amitriptyline (ELAVIL) 50 MG tablet Take 50 mg by mouth every evening.   Yes Historical Provider   benazepriL (LOTENSIN) 20 MG tablet Take 20 mg by mouth 2 (two) times daily. 8/16/22  Yes Historical Provider   bisoprolol (ZEBETA) 5 MG tablet Take 5 mg by mouth once daily. 7/15/22  Yes Historical Provider   folic acid (FOLVITE) 1 MG tablet Take 1,000 mcg by mouth once daily. 10/12/22  Yes Historical Provider   lactulose (CHRONULAC) 10 gram/15 mL solution Take 15 mLs by mouth 2 (two) times daily.   Yes Historical Provider   nystatin (MYCOSTATIN) cream Apply 1 g topically 2 (two) times daily. 10/12/22  Yes Historical Provider   pantoprazole (PROTONIX) 40 MG tablet Take 40 mg by mouth 2 (two) times daily. 10/12/22  Yes Historical Provider   pregabalin (LYRICA) 100 MG capsule Take 100 mg by mouth 3 (three) times daily.   Yes Historical Provider   fluticasone propionate (FLONASE) 50 mcg/actuation nasal  "spray 1 spray (50 mcg total) by Each Nostril route 2 (two) times daily. 3/24/21   Daniele Woodard MD   multivitamin (ONE DAILY MULTIVITAMIN) per tablet Take 1 tablet by mouth once daily.    Historical Provider     Scheduled Meds:    folic acid  1,000 mcg Oral Daily    Lactobacillus rhamnosus GG  1 capsule Oral Daily    lactulose  10 g Oral BID    miconazole NITRATE 2 %   Topical (Top) BID    midodrine  15 mg Oral Q8H    octreotide  100 mcg Subcutaneous Q8H    [START ON 11/1/2022] pregabalin  75 mg Oral Daily    sodium bicarbonate  1,300 mg Oral TID    thiamine  100 mg Oral Daily     Continuous Infusions:   PRN Meds:acetaminophen, dextrose 10%, dextrose 10%, miconazole nitrate 2%, ondansetron, prochlorperazine  Anticoagulants/Antiplatelets: no anticoagulation    Allergies:   Review of patient's allergies indicates:   Allergen Reactions    Codeine Itching, Anaphylaxis and Hives    Hydrocodone-acetaminophen      "Agitation"    Influenza virus vaccines Other (See Comments)    Oxycodone-acetaminophen      "Agitation"     Sedation Hx: have not been any systemic reactions    Vitals:  Temp: 97.6 °F (36.4 °C) (10/31/22 1154)  Pulse: 97 (10/31/22 1243)  Resp: 16 (10/31/22 1243)  BP: 105/61 (10/31/22 1243)  SpO2: 97 % (10/31/22 1243)     Physical Exam:  ASA: 3  Mallampati: n/a    General: no acute distress  Mental Status: alert and oriented to person, place and time  HEENT: normocephalic, atraumatic  Chest: unlabored breathing  Heart: regular heart rate  Abdomen: nondistended  Extremity: moves all extremities    Plan: US guided Paracentesis  Sedation Plan: Local  Shelia Jaramillo PA-C  Interventional Radiology  [phone]      "

## 2022-10-31 NOTE — ASSESSMENT & PLAN NOTE
Patient with baseline creatinine of 1.0 which worsened to creatinine of 1.4 at the time of admission to OSH and subsequently 3.8 upon transfer to [facility].  Notably, creatinine 2.4 upon repeat so suspect 3.8 was erroneous. In the setting of cirrhosis, there was some concern for hepatorenal syndrome so patient was started on albumin and midodrine.  Urine sodium (<10) consistent with hepatorenal syndrome but patient with robust blood pressures so less likely.  Lower suspicion for abdominal compartment syndrome given recent paracentesis.  Bilirubin elevated.  Suspect multifactorial including HRS c/b ATN v bilirubin tubulopathy.     Patient with steadily worsening renal function during admission. Anuric since 10/28. Cr 7.2 on 10/31. No significant acidosis, electrolyte abnormality noted, though patient with worsening abdominal pain and distension s/o ascites. Anticipate he will need RRT in the near future. Patient planning on discharging to [facility] via medical transport tomorrow 11/1. If patient does not discharge, will plan for line placement and HD.     Recommendations:   - Line placement for HD tomorrow if patient does not discharge   - okay to continue HRS regimen at this time  - s/p albumin protocol for volume expansion  - strict intake and output  - maintain map > 85 if treating for HRS  - renally dose medications and avoid nephrotoxins when possible     Constricted

## 2022-10-31 NOTE — SEDATION DOCUMENTATION
Paracentesis complete. 7200 mLs peritoneal fluid drained. Pt tolerated well. Dressing to Right abd clean, dry, and intact. Albumin 25% given 150 mLs. Specimens sent per lab order. Report called to floor nurse.

## 2022-10-31 NOTE — PT/OT/SLP PROGRESS
Occupational Therapy   Treatment    Name: Marshall Corona  MRN: [id number]  Admitting Diagnosis:  KATHE (acute kidney injury)       Recommendations:     Discharge Recommendations: rehabilitation facility  Discharge Equipment Recommendations:  bedside commode  Barriers to discharge:  None    Assessment:     Marshall Corona is a 47 y.o. male with a medical diagnosis of KATHE (acute kidney injury).  He presents with lethargy and agitation. Pt presents with increased fluid retention that has exacerbated pain. Pt declined getting out of bed and was educated on the importance of OOB activities and self care. Pt performed AROM exercises with 1 set of 10 repetitions focused on BUE/BLE. Pt followed commands and agreed to work on OOB activities once returned from echo. On second attempt PCT asked for (A) and pt required Max (A) x1 for bed <> stand pivot transfer.    Performance deficits affecting function are weakness, impaired endurance, impaired self care skills, impaired functional mobility, gait instability, impaired balance, decreased safety awareness, pain.     Rehab Prognosis:  Fair; patient would benefit from acute skilled OT services to address these deficits and reach maximum level of function.       Plan:     Patient to be seen 3 x/week to address the above listed problems via self-care/home management, therapeutic activities, therapeutic exercises, neuromuscular re-education  Plan of Care Expires: 11/30/22  Plan of Care Reviewed with: patient    Subjective     Pain/Comfort:  Pain Rating 1: 9/10  Location - Side 1: Bilateral  Location - Orientation 1: lower  Location 1: back  Pain Addressed 1: Reposition, Distraction  Pain Rating Post-Intervention 1: 9/10    Objective:     Communicated with: nurse prior to session.  Patient found HOB elevated with  (No active lines) upon OT entry to room.    General Precautions: Standard, fall   Orthopedic Precautions:N/A   Braces: N/A  Respiratory Status: Room air     Occupational  Performance:     Bed Mobility:    Patient completed Rolling/Turning to Left with  minimum assistance  Patient completed Rolling/Turning to Right with minimum assistance  Patient completed Scooting/Bridging with minimum assistance  Patient completed Supine to Sit with minimum assistance  Patient completed Sit to Supine with minimum assistance     Functional Mobility/Transfers:  Patient completed Toilet Transfer Stand Pivot technique with maximal assistance with  no AD  Functional Mobility: Pt performed 5 lateral steps to reach [facility]    Activities of Daily Living:  Toileting: contact guard assistance Pt required Max (A) for transfer, but was able to perform natasha care.      [facility] 6 Click ADL: 18    Treatment & Education:  Pt educated on role of OT/POC  Pt. Educated on safety precautions   Pt provided self-care/ADL re-education  Pt reviewed bed mobility/functional mobility  Pt completed 1 sets of 10 reps of B UE AROM exercise program sitting unsupported EOB in order to work towards increasing UB strength/endurance and to maximize safety and (I) with mobility and self-care skills.  Pt completed the following exercises with initial demonstration from therapist: shld flexion/extension, elbow flexion/extension, chest press. Pt instructed to perform B UEs daily in order to improve B UE function and maintain joint integrity.      Patient left HOB elevated with all lines intact, call button in reach, and nurse notified    GOALS:   Multidisciplinary Problems       Occupational Therapy Goals          Problem: Occupational Therapy    Goal Priority Disciplines Outcome Interventions   Occupational Therapy Goal     OT, PT/OT Ongoing, Progressing    Description: Goals to be met by: 10/24/22     Patient will increase functional independence with ADLs by performing:    UE Dressing with Modified [place].  LE Dressing with Modified [place].  Grooming while standing at sink with Modified [place].  Toileting from toilet with  Modified [place] for hygiene and clothing management.   Toilet transfer to toilet with Modified [place].                         Time Tracking:     OT Date of Treatment: 10/31/22  OT Start Time: 0840  OT Stop Time: 0854 (Seconds visit 11:14 - 11:24)  OT Total Time (min): 24 billed minutes     Billable Minutes:Self Care/Home Management 14  Therapeutic Exercise 10    OT/TOM: OT     TOM Visit Number: 0    10/31/2022

## 2022-11-01 VITALS
TEMPERATURE: 98 F | RESPIRATION RATE: 16 BRPM | HEIGHT: 72 IN | WEIGHT: 205.25 LBS | OXYGEN SATURATION: 97 % | DIASTOLIC BLOOD PRESSURE: 31 MMHG | BODY MASS INDEX: 27.8 KG/M2 | SYSTOLIC BLOOD PRESSURE: 83 MMHG | HEART RATE: 94 BPM

## 2022-11-01 PROBLEM — D64.9 SYMPTOMATIC ANEMIA: Status: RESOLVED | Noted: 2022-10-15 | Resolved: 2022-11-01

## 2022-11-01 LAB
ALBUMIN SERPL BCP-MCNC: 3.2 G/DL (ref 3.5–5.2)
ALP SERPL-CCNC: 179 U/L (ref 55–135)
ALT SERPL W/O P-5'-P-CCNC: 20 U/L (ref 10–44)
ANION GAP SERPL CALC-SCNC: 18 MMOL/L (ref 8–16)
AST SERPL-CCNC: 46 U/L (ref 10–40)
BASOPHILS # BLD AUTO: 0.08 K/UL (ref 0–0.2)
BASOPHILS NFR BLD: 0.4 % (ref 0–1.9)
BILIRUB SERPL-MCNC: 12.7 MG/DL (ref 0.1–1)
BUN SERPL-MCNC: 72 MG/DL (ref 6–20)
CALCIUM SERPL-MCNC: 8.6 MG/DL (ref 8.7–10.5)
CHLORIDE SERPL-SCNC: 91 MMOL/L (ref 95–110)
CO2 SERPL-SCNC: 18 MMOL/L (ref 23–29)
CREAT SERPL-MCNC: 7.8 MG/DL (ref 0.5–1.4)
DIFFERENTIAL METHOD: ABNORMAL
EOSINOPHIL # BLD AUTO: 0.5 K/UL (ref 0–0.5)
EOSINOPHIL NFR BLD: 2.6 % (ref 0–8)
ERYTHROCYTE [DISTWIDTH] IN BLOOD BY AUTOMATED COUNT: 18.8 % (ref 11.5–14.5)
EST. GFR  (NO RACE VARIABLE): 7.8 ML/MIN/1.73 M^2
GLUCOSE SERPL-MCNC: 154 MG/DL (ref 70–110)
HCT VFR BLD AUTO: 23.7 % (ref 40–54)
HGB BLD-MCNC: 8.1 G/DL (ref 14–18)
IMM GRANULOCYTES # BLD AUTO: 0.08 K/UL (ref 0–0.04)
IMM GRANULOCYTES NFR BLD AUTO: 0.4 % (ref 0–0.5)
INR PPP: 1.9 (ref 0.8–1.2)
LYMPHOCYTES # BLD AUTO: 2.2 K/UL (ref 1–4.8)
LYMPHOCYTES NFR BLD: 10.7 % (ref 18–48)
MCH RBC QN AUTO: 34.2 PG (ref 27–31)
MCHC RBC AUTO-ENTMCNC: 34.2 G/DL (ref 32–36)
MCV RBC AUTO: 100 FL (ref 82–98)
MONOCYTES # BLD AUTO: 1.2 K/UL (ref 0.3–1)
MONOCYTES NFR BLD: 5.7 % (ref 4–15)
NEUTROPHILS # BLD AUTO: 16.7 K/UL (ref 1.8–7.7)
NEUTROPHILS NFR BLD: 80.2 % (ref 38–73)
NRBC BLD-RTO: 0 /100 WBC
PLATELET # BLD AUTO: 49 K/UL (ref 150–450)
PMV BLD AUTO: ABNORMAL FL (ref 9.2–12.9)
POTASSIUM SERPL-SCNC: 3.7 MMOL/L (ref 3.5–5.1)
PROT SERPL-MCNC: 5.5 G/DL (ref 6–8.4)
PROTHROMBIN TIME: 18.9 SEC (ref 9–12.5)
RBC # BLD AUTO: 2.37 M/UL (ref 4.6–6.2)
SODIUM SERPL-SCNC: 127 MMOL/L (ref 136–145)
WBC # BLD AUTO: 20.81 K/UL (ref 3.9–12.7)

## 2022-11-01 PROCEDURE — 85610 PROTHROMBIN TIME: CPT | Performed by: HOSPITALIST

## 2022-11-01 PROCEDURE — 85025 COMPLETE CBC W/AUTO DIFF WBC: CPT | Performed by: HOSPITALIST

## 2022-11-01 PROCEDURE — P9047 ALBUMIN (HUMAN), 25%, 50ML: HCPCS | Mod: JG | Performed by: STUDENT IN AN ORGANIZED HEALTH CARE EDUCATION/TRAINING PROGRAM

## 2022-11-01 PROCEDURE — 99239 HOSP IP/OBS DSCHRG MGMT >30: CPT | Mod: ,,, | Performed by: STUDENT IN AN ORGANIZED HEALTH CARE EDUCATION/TRAINING PROGRAM

## 2022-11-01 PROCEDURE — 25000003 PHARM REV CODE 250: Performed by: STUDENT IN AN ORGANIZED HEALTH CARE EDUCATION/TRAINING PROGRAM

## 2022-11-01 PROCEDURE — 36415 COLL VENOUS BLD VENIPUNCTURE: CPT | Performed by: HOSPITALIST

## 2022-11-01 PROCEDURE — 63600175 PHARM REV CODE 636 W HCPCS: Mod: JB | Performed by: HOSPITALIST

## 2022-11-01 PROCEDURE — 80053 COMPREHEN METABOLIC PANEL: CPT | Performed by: HOSPITALIST

## 2022-11-01 PROCEDURE — 99233 SBSQ HOSP IP/OBS HIGH 50: CPT | Mod: ,,, | Performed by: INTERNAL MEDICINE

## 2022-11-01 PROCEDURE — 99233 PR SUBSEQUENT HOSPITAL CARE,LEVL III: ICD-10-PCS | Mod: ,,, | Performed by: INTERNAL MEDICINE

## 2022-11-01 PROCEDURE — 25000003 PHARM REV CODE 250: Performed by: HOSPITALIST

## 2022-11-01 PROCEDURE — 99239 PR HOSPITAL DISCHARGE DAY,>30 MIN: ICD-10-PCS | Mod: ,,, | Performed by: STUDENT IN AN ORGANIZED HEALTH CARE EDUCATION/TRAINING PROGRAM

## 2022-11-01 PROCEDURE — 63600175 PHARM REV CODE 636 W HCPCS: Mod: JG | Performed by: STUDENT IN AN ORGANIZED HEALTH CARE EDUCATION/TRAINING PROGRAM

## 2022-11-01 RX ORDER — LACTULOSE 10 G/15ML
20 SOLUTION ORAL; RECTAL 2 TIMES DAILY
Qty: 1800 ML | Refills: 2 | Status: SHIPPED | OUTPATIENT
Start: 2022-11-01 | End: 2022-12-01

## 2022-11-01 RX ORDER — ALBUMIN HUMAN 250 G/1000ML
50 SOLUTION INTRAVENOUS ONCE
Status: COMPLETED | OUTPATIENT
Start: 2022-11-01 | End: 2022-11-01

## 2022-11-01 RX ORDER — LACTULOSE 10 G/15ML
20 SOLUTION ORAL 2 TIMES DAILY
Status: DISCONTINUED | OUTPATIENT
Start: 2022-11-01 | End: 2022-11-01 | Stop reason: HOSPADM

## 2022-11-01 RX ORDER — SODIUM CHLORIDE 9 MG/ML
INJECTION, SOLUTION INTRAVENOUS CONTINUOUS
Status: ACTIVE | OUTPATIENT
Start: 2022-11-01 | End: 2022-11-01

## 2022-11-01 RX ADMIN — MIDODRINE HYDROCHLORIDE 15 MG: 5 TABLET ORAL at 05:11

## 2022-11-01 RX ADMIN — ALBUMIN (HUMAN) 50 G: 12.5 SOLUTION INTRAVENOUS at 01:11

## 2022-11-01 RX ADMIN — OCTREOTIDE ACETATE 100 MCG: 100 INJECTION, SOLUTION INTRAVENOUS; SUBCUTANEOUS at 06:11

## 2022-11-01 RX ADMIN — FOLIC ACID 1000 MCG: 1 TABLET ORAL at 09:11

## 2022-11-01 RX ADMIN — MIDODRINE HYDROCHLORIDE 15 MG: 5 TABLET ORAL at 12:11

## 2022-11-01 RX ADMIN — OCTREOTIDE ACETATE 100 MCG: 100 INJECTION, SOLUTION INTRAVENOUS; SUBCUTANEOUS at 05:11

## 2022-11-01 RX ADMIN — OCTREOTIDE ACETATE 100 MCG: 100 INJECTION, SOLUTION INTRAVENOUS; SUBCUTANEOUS at 01:11

## 2022-11-01 RX ADMIN — Medication 100 MG: at 09:11

## 2022-11-01 RX ADMIN — LACTULOSE 20 G: 20 SOLUTION ORAL at 09:11

## 2022-11-01 RX ADMIN — SODIUM BICARBONATE 650 MG TABLET 1300 MG: at 01:11

## 2022-11-01 RX ADMIN — Medication 1 CAPSULE: at 09:11

## 2022-11-01 RX ADMIN — MICONAZOLE NITRATE 2 % TOPICAL POWDER: at 09:11

## 2022-11-01 RX ADMIN — SODIUM BICARBONATE 650 MG TABLET 1300 MG: at 09:11

## 2022-11-01 RX ADMIN — SODIUM CHLORIDE: 0.9 INJECTION, SOLUTION INTRAVENOUS at 09:11

## 2022-11-01 NOTE — PLAN OF CARE
[facility] - Transplant Stepdown  Discharge Reassessment    Primary Care Provider: Primary Doctor No    Expected Discharge Date: 11/1/2022    Reassessment (most recent)       Discharge Reassessment - 11/01/22 0612          Discharge Reassessment    Assessment Type Discharge Planning Reassessment     Did the patient's condition or plan change since previous assessment? No     Discharge Plan discussed with: Patient     Discharge Plan A --   TBD    Discharge Plan B --   TBD    DME Needed Upon Discharge  none     Discharge Barriers Identified No family/friends to help;Transportation     Why the patient remains in the hospital Requires continued medical care

## 2022-11-01 NOTE — NURSING
"Pt AAOx4 throughout the day, afebrile, on room air. BP dropped midday to 70-80s/40s; patient asymptomatic. Dr. Lee notified. Midodrine given early. Albumin given. BP increased to 90s/50s this afternoon. Dr. Lee to bedside to speak to patient regarding discharge; I was also at bedside for the conversation. Pt stated that does not want to go to ICU now or if BP drops more. He stated that he is aware that it is possible for BP to drop again on long ride home, which could be a medical emergency. He said, "I want to go home by my family no matter which way this goes." Appetite is poor. He is up with 2 assist. Transport scheduled to pickup patient between 6-7.   BP dropped to 80/40s this evening; Dr. Lee notified; midodrine given per Dr. Knight's recs. Octreotide also given prior to transport. At time of shift change, pt transport not yet here. Discharge instructions given to patient. Sister also aware to go to hospital as soon as patient arrives. Patient's belongings and delivered meds packed.  Lyrica discontinued after it was delivered to pt. He was educated on why he can not take it with his kidney function, and medication was disposed of per pharmacy recs.  "

## 2022-11-01 NOTE — PLAN OF CARE
11/01/22 1204   Post-Acute Status   Post-Acute Authorization Placement   Post-Acute Placement Status Patient declined/refused       The patient's plan is to go to [place] with family.  The patient's medical transportation has been scheduled for today 11-1-22.     Morgan Travis LMSW  Case Management [facility]  Ext: [phone]

## 2022-11-01 NOTE — SUBJECTIVE & OBJECTIVE
"Interval History: NAEON. Patient continues to have worsening renal function. Anuric x4 days. Underwent paracentesis yesterday. Patient discharging today to go to [place].     Review of patient's allergies indicates:   Allergen Reactions    Codeine Itching, Anaphylaxis and Hives    Hydrocodone-acetaminophen      "Agitation"    Influenza virus vaccines Other (See Comments)    Oxycodone-acetaminophen      "Agitation"     Current Facility-Administered Medications   Medication Frequency    acetaminophen tablet 650 mg Q8H PRN    dextrose 10% bolus 125 mL PRN    dextrose 10% bolus 250 mL PRN    folic acid tablet 1,000 mcg Daily    Lactobacillus rhamnosus GG capsule 1 capsule Daily    lactulose 20 gram/30 mL solution Soln 20 g BID    miconazole NITRATE 2 % top powder BID    miconazole nitrate 2% ointment PRN    midodrine tablet 15 mg Q8H    octreotide injection 100 mcg Q8H    ondansetron injection 4 mg Q6H PRN    prochlorperazine injection Soln 5 mg Q6H PRN    sodium bicarbonate tablet 1,300 mg TID    thiamine tablet 100 mg Daily       Objective:     Vital Signs (Most Recent):  Temp: 97.4 °F (36.3 °C) (11/01/22 1624)  Pulse: 95 (11/01/22 1624)  Resp: 20 (11/01/22 0900)  BP: (!) 83/41 (11/01/22 1624)  SpO2: 97 % (11/01/22 1624)  O2 Device (Oxygen Therapy): room air (11/01/22 1203) Vital Signs (24h Range):  Temp:  [97.4 °F (36.3 °C)-97.9 °F (36.6 °C)] 97.4 °F (36.3 °C)  Pulse:  [] 95  Resp:  [18-20] 20  SpO2:  [94 %-98 %] 97 %  BP: ()/(41-65) 83/41     Weight: 93.1 kg (205 lb 4 oz) (11/01/22 0530)  Body mass index is 27.84 kg/m².  Body surface area is 2.17 meters squared.    I/O last 3 completed shifts:  In: 360 [P.O.:360]  Out: -     Physical Exam  Constitutional:       Appearance: Normal appearance.   Eyes:      General: Scleral icterus present.      Conjunctiva/sclera: Conjunctivae normal.   Cardiovascular:      Rate and Rhythm: Normal rate and regular rhythm.      Pulses: Normal pulses.      Heart sounds: " Normal heart sounds.   Pulmonary:      Effort: Pulmonary effort is normal. No respiratory distress.      Breath sounds: Normal breath sounds.   Abdominal:      General: Bowel sounds are normal. There is distension (improved after para).      Palpations: Abdomen is soft.      Tenderness: There is abdominal tenderness.   Musculoskeletal:      Right lower leg: Edema (trace pitting) present.      Left lower leg: Edema (trace pitting) present.   Skin:     General: Skin is warm and dry.      Coloration: Skin is jaundiced.      Findings: No bruising.   Neurological:      Mental Status: He is alert and oriented to person, place, and time.       Significant Labs:  CBC:   Recent Labs   Lab 11/01/22  0621   WBC 20.81*   RBC 2.37*   HGB 8.1*   HCT 23.7*   PLT 49*   *   MCH 34.2*   MCHC 34.2       CMP:   Recent Labs   Lab 11/01/22  0621   *   CALCIUM 8.6*   ALBUMIN 3.2*   PROT 5.5*   *   K 3.7   CO2 18*   CL 91*   BUN 72*   CREATININE 7.8*   ALKPHOS 179*   ALT 20   AST 46*   BILITOT 12.7*       All labs within the past 24 hours have been reviewed.     Significant Imaging:      4 = No assist / stand by assistance

## 2022-11-01 NOTE — DISCHARGE SUMMARY
[facility] Medicine  Discharge Summary      Patient Name: Marshall Corona  MRN: [id number]  Patient Class: IP- Inpatient  Admission Date: 10/22/2022  Hospital Length of Stay: 10 days  Discharge Date and Time:  11/01/2022 11:49 AM  Attending Physician: Amando Knight MD   Discharging Provider: Amando Knight MD  Primary Care Provider: Primary Doctor [facility] Medicine Team: [facility] MED  Amando Knight MD    HPI:   TRANSFER CENTER  PHYSICIAN SUMMARY  47-year-old m with PMH HTN, GBS, diabetes, presumed alcoholic cirrhosis admitted to [facility] on 10/15 with weakness and abd distension.  Patient reports that his last drink was 2 months ago.     On admission VS unremarkable.  PEx pos for scleral icterus and abd distension with no tenderness.  He was AOX3.  WBC 12.9, Hb 7.2, Plts 142, INR 1.6 > 1. Na 132, K 3.7, CO2 21, BUN 50, Cr 1.41 (BL 0.91), bilirubin 8.8, AST 68, ALT 37, HV A/B/C neg.  COVID neg.       CT abd pos for cirrhosis with moderate volume ascites.  Also, 1.4 x 1 cm nonspecific hypoattenuating lesion in near the splenic hilum.  US abdomen pos for cirrhotic liver.  Stasis flow suggestive of portal HTN.  Moderate ascites.       On 10/18 the patient was noted to have a right axillary abscess a/w rising WBC.  The abscess was drained and patient placed on clindamycin > linezolid and CTX.  Cultures NGTD.     Hospitalization a/w increasing renal failure with  Cr 1.41 (10/15) > 1.66 (10/19) > 2 (10/21) > 3.8 (10/22) following a large volume paracentesis on 10/21.  BP fell into the mid 90s briefly following the procedure. Patient was given albumin (25 g x 4 ) and placed on midodrine     Also hosppitalization a/w increasing liver failure: T bili 8.8 > > 10.2, AST 68 > > 88. MELD Na 35     Transfer requested for liver XPL evaluation.  Transfer diagnosis acute liver failure, acute renal failure with concern for hepatorenal syndrome.     BEDSIDE EVAL    At bedside patient reports he  feels ok.  Before paracentesis on 10/21, he's had one prior at a another hospital earlier this month where 4L was removed.  He feels after recent paracentesis he has increased abdominal swelling and pain, feels fluid has reaccumulated faster or he is more bloated.  Reports last alcohol use was a few months ago, states he quit prior to diagnosis of liver disease.     Reports having Guillain [place] syndrome in 2019 after an insect bite, stated that he has not fully recovered from this, after his discharge and receiving rehab, continued as an outpatient but when pandemic started in 2020, could not continue prior rehab efforts and reports chronic debility as a result.   \  He uses smokeless tobacco [place] Dip.  He denies any hx of Intra-nasal or injection illicit drug use.       * No surgery found *      Hospital Course:   Patient with zero family in [place], majority in [place]. Social support and limited sobriety precluding him from transplant evaluation here. Patient's family organizing transportation for him to be in [place] with them at home. He and family are aware that his renal function continues to decline and that he likely will require dialysis soon, but they want to either pursue hospice care or HD when he arrives at his sister's house and they discuss it further. Furthermore, he must be a resident of [place] to be eligible for [place] medicaid which his sister has been working on arranging. Patient otherwise remains hemodynamically stable but is still very ill with MELD of 36       Goals of Care Treatment Preferences:  Code Status: Full Code    Health care agent: Lisa Jamil  [facility] care agent number: [phone]          What is most important right now is to focus on curative/life-prolongation (regardless of treatment burdens).  Accordingly, we have decided that the best plan to meet the patient's goals includes continuing with treatment.      Consults:   Consults (From admission, onward)           Status Ordering Provider     Inpatient consult to Palliative Care  Once        Provider:  (Not yet assigned)    Completed SHASTA HENRY     Inpatient consult to Registered Dietitian/Nutritionist  Once        Provider:  (Not yet assigned)    Completed SHASTA HENRY     Inpatient consult to Psychiatry  Once        Provider:  (Not yet assigned)    Completed SHASTA HENRY     Inpatient consult to Hepatology  Once        Provider:  (Not yet assigned)    Completed FERNANDO CORONADO     Inpatient consult to Nephrology  Once        Provider:  (Not yet assigned)    Completed FERNANDO CORONADO            * KATHE (acute kidney injury)  Worsening  Patient with acute kidney injury likely due to HRS vs bile cast nephropathy vs ATN KATHE is currently worsening. Labs reviewed- Renal function/electrolytes with Estimated Creatinine Clearance: 12.6 mL/min (A) (based on SCr of 7.8 mg/dL (H)). according to latest data. Monitor urine output and serial BMP and adjust therapy as needed. Avoid nephrotoxins and renally dose meds for GFR listed above.   - No emergent needs for HD at this time, K+ remains stable but BUN/Cr rising. Per Nephro, will need HD in the coming days but they are aware of patient wanting to go home with family via medical transportation to [place] and that he has not committed to HD at this time but may at a later time.   - Nephrology following  - Cont midodrine, octreotide while admitted.  - Continuing sodium bicarb for acidosis 2/2 renal function    Hypotension  Secondary to decompensated liver disease that is not improving. Patient aware that his BP places him at risk for further decompensation and that he may not survive the trip to [place] via medical transportation. He and sister both accept the risk that he may pass en route, but he wants to be with family at this time.   - On midodrine 15mg TID  - No obvious source of ongoing infection, previously treated for abscess of axilla.   - Currently  hemodynamically stable      Alcoholic cirrhosis  MELD-Na score: 37 at 11/1/2022  6:21 AM  MELD score: 36 at 11/1/2022  6:21 AM  Calculated from:  Serum Creatinine: 7.8 mg/dL (Using max of 4 mg/dL) at 11/1/2022  6:21 AM  Serum Sodium: 127 mmol/L at 11/1/2022  6:21 AM  Total Bilirubin: 12.7 mg/dL at 11/1/2022  6:21 AM  INR(ratio): 1.9 at 11/1/2022  6:21 AM  Age: 47 years  Not a transplant evaluation candidate given limited sobriety and lack of family in [place] in addition to alcohol use despite knowledge of liver disease. Last PETH negative however  - Continuing midodrine and octreotide for HRS while admitted, will discharge on midodrine 15mg TID  - Continuing lactulose for HE  - s/p Vitamin K x3 days  - Repeat para negative for SBP      Hyponatremia  Stable.  Secondary to underlying cirrhosis             Debility  Hx of Guillain barre syndrome - reports not fully recovered, left with chronic debility   - PT/OT consults.       Final Active Diagnoses:    Diagnosis Date Noted POA    PRINCIPAL PROBLEM:  KATHE (acute kidney injury) [N17.9] 10/20/2022 Yes    Alcoholic cirrhosis [K70.30] 10/15/2022 Yes    Hypotension [I95.9] 10/20/2021 Yes    Encounter for palliative care [Z51.5] 10/26/2022 Not Applicable    Counseling regarding advanced care planning and goals of care [Z71.89] 10/26/2022 Not Applicable    Alcohol dependence in remission [F10.21] 10/23/2022 Yes     Chronic    Decompensated hepatic cirrhosis [K72.90, K74.60] 10/23/2022 Yes    Abscess of right arm [L02.413] 10/18/2022 Yes    Hyponatremia [E87.1] 10/18/2022 Yes    Debility [R53.81] 10/16/2022 Yes      Problems Resolved During this Admission:    Diagnosis Date Noted Date Resolved POA    Benign essential HTN [I10] 10/15/2022 10/27/2022 Yes    Symptomatic anemia [D64.9] 10/15/2022 11/01/2022 Yes       Discharged Condition: poor    Disposition: Home or Self Care    Follow Up:    Patient Instructions:   No discharge procedures on file.    Significant Diagnostic  Studies: Labs:   CMP   Recent Labs   Lab 10/31/22  0706 11/01/22  0621   * 127*   K 3.6 3.7   CL 94* 91*   CO2 18* 18*   * 154*   BUN 67* 72*   CREATININE 7.2* 7.8*   CALCIUM 8.6* 8.6*   PROT 5.7* 5.5*   ALBUMIN 3.3* 3.2*   BILITOT 12.8* 12.7*   ALKPHOS 186* 179*   AST 52* 46*   ALT 23 20   ANIONGAP 19* 18*    and CBC   Recent Labs   Lab 10/31/22  0706 11/01/22  0621   WBC 15.16* 20.81*   HGB 8.1* 8.1*   HCT 23.7* 23.7*   PLT 54* 49*     Radiology:   Ultrasound liver 10/23  Impression:     Hepatomegaly, nodular contour of the liver, ascites and splenomegaly consistent with known cirrhosis.     Reversal of flow in the main, left, and anterior right portal veins consistent with portal hypertension.     Partial thrombosis of the main portal vein.  Flow could not be identified within the splenic vein or SMV although these are poorly visualized.  Additional thrombosis in these vessels cis not excluded.    Ultrasound Kidney 10/23  Impression:     Mild renal cortical thinning suggesting chronic medical renal disease     Moderate ascites and nodular liver suggesting cirrhosis are incidentally noted.      Pending Diagnostic Studies:       Procedure Component Value Units Date/Time    IR Paracentesis with Imaging [[id number]] Resulted: 10/31/22 1451    Order Status: Sent Lab Status: In process Updated: 10/31/22 1453           Medications:  Reconciled Home Medications:      Medication List        START taking these medications      midodrine 5 MG Tab  Commonly known as: PROAMATINE  Take 3 tablets (15 mg total) by mouth every 8 (eight) hours.     ondansetron 4 MG tablet  Commonly known as: ZOFRAN  Take 2 tablets (8 mg total) by mouth every 12 (twelve) hours as needed for Nausea.     Remedy Phytoplex AntifungaL 2 % top powder  Generic drug: miconazole NITRATE 2 %  Apply topically 2 (two) times daily.     sodium bicarbonate 650 MG tablet  Take 2 tablets (1,300 mg total) by mouth 3 (three) times daily.     thiamine 100  MG tablet  Take 1 tablet (100 mg total) by mouth once daily.            CHANGE how you take these medications      lactulose 10 gram/15 mL solution  Commonly known as: CHRONULAC  Take 30 mLs (20 g total) by mouth 2 (two) times daily.  What changed: how much to take            CONTINUE taking these medications      fluticasone propionate 50 mcg/actuation nasal spray  Commonly known as: FLONASE  1 spray (50 mcg total) by Each Nostril route 2 (two) times daily.     folic acid 1 MG tablet  Commonly known as: FOLVITE  Take 1,000 mcg by mouth once daily.            STOP taking these medications      amitriptyline 50 MG tablet  Commonly known as: ELAVIL     benazepriL 20 MG tablet  Commonly known as: LOTENSIN     bisoprolol 5 MG tablet  Commonly known as: ZEBETA     nystatin cream  Commonly known as: MYCOSTATIN     ONE DAILY MULTIVITAMIN per tablet  Generic drug: multivitamin     pantoprazole 40 MG tablet  Commonly known as: PROTONIX     pregabalin 100 MG capsule  Commonly known as: LYRICA              Indwelling Lines/Drains at time of discharge:   Lines/Drains/Airways       None                   Time spent on the discharge of patient: 35 minutes         Amando Knight MD  Department of Hospital Medicine  [facility] - Transplant [facility]

## 2022-11-01 NOTE — ASSESSMENT & PLAN NOTE
Worsening  Patient with acute kidney injury likely due to HRS vs bile cast nephropathy vs ATN KATHE is currently worsening. Labs reviewed- Renal function/electrolytes with Estimated Creatinine Clearance: 12.6 mL/min (A) (based on SCr of 7.8 mg/dL (H)). according to latest data. Monitor urine output and serial BMP and adjust therapy as needed. Avoid nephrotoxins and renally dose meds for GFR listed above.   - No emergent needs for HD at this time, K+ remains stable but BUN/Cr rising. Per Nephro, will need HD in the coming days but they are aware of patient wanting to go home with family via medical transportation to [place] and that he has not committed to HD at this time but may at a later time.   - Nephrology following  - Cont midodrine, octreotide while admitted.  - Continuing sodium bicarb for acidosis 2/2 renal function

## 2022-11-01 NOTE — ASSESSMENT & PLAN NOTE
Hx of Guillain barre syndrome - reports not fully recovered, left with chronic debility   - PT/OT consults.

## 2022-11-01 NOTE — PROGRESS NOTES
"[facility]  Nephrology  Progress Note    Patient Name: Marshall Corona  MRN: [id number]  Admission Date: 10/22/2022  Hospital Length of Stay: 10 days  Attending Provider: Amando Knight MD   Primary Care Physician: Primary Doctor No  Principal Problem:KATHE (acute kidney injury)    Subjective:     HPI: Marshall Corona is a 47-year-old male with hypertension, diabetes, alcoholic cirrhosis who presented to OSH with weakness and abdominal distention. Patient was subsequently transferred to [facility] for liver transplant evaluation.  During hospitalization at OSH patient noted to have right axillary abscess which was drained and he was subsequently started on clindamycin and transitioned to linezolid.  Additionally patient with worsening renal function during admission with creatinine of 1.4 on admit with subsequent worsening up to 3.8 at the time of transfer.  Outside records noting worsening renal function following paracentesis and associated hypotension for which he received albumin and was started on midodrine.  Nephrology was consulted for the management of KATHE.      Interval History: NAEON. Patient continues to have worsening renal function. Anuric x4 days. Underwent paracentesis yesterday. Patient discharging today to go to [place].     Review of patient's allergies indicates:   Allergen Reactions    Codeine Itching, Anaphylaxis and Hives    Hydrocodone-acetaminophen      "Agitation"    Influenza virus vaccines Other (See Comments)    Oxycodone-acetaminophen      "Agitation"     Current Facility-Administered Medications   Medication Frequency    acetaminophen tablet 650 mg Q8H PRN    dextrose 10% bolus 125 mL PRN    dextrose 10% bolus 250 mL PRN    folic acid tablet 1,000 mcg Daily    Lactobacillus rhamnosus GG capsule 1 capsule Daily    lactulose 20 gram/30 mL solution Soln 20 g BID    miconazole NITRATE 2 % top powder BID    miconazole nitrate 2% ointment PRN    midodrine tablet 15 mg " Q8H    octreotide injection 100 mcg Q8H    ondansetron injection 4 mg Q6H PRN    prochlorperazine injection Soln 5 mg Q6H PRN    sodium bicarbonate tablet 1,300 mg TID    thiamine tablet 100 mg Daily       Objective:     Vital Signs (Most Recent):  Temp: 97.4 °F (36.3 °C) (11/01/22 1624)  Pulse: 95 (11/01/22 1624)  Resp: 20 (11/01/22 0900)  BP: (!) 83/41 (11/01/22 1624)  SpO2: 97 % (11/01/22 1624)  O2 Device (Oxygen Therapy): room air (11/01/22 1203) Vital Signs (24h Range):  Temp:  [97.4 °F (36.3 °C)-97.9 °F (36.6 °C)] 97.4 °F (36.3 °C)  Pulse:  [] 95  Resp:  [18-20] 20  SpO2:  [94 %-98 %] 97 %  BP: ()/(41-65) 83/41     Weight: 93.1 kg (205 lb 4 oz) (11/01/22 0530)  Body mass index is 27.84 kg/m².  Body surface area is 2.17 meters squared.    I/O last 3 completed shifts:  In: 360 [P.O.:360]  Out: -     Physical Exam  Constitutional:       Appearance: Normal appearance.   Eyes:      General: Scleral icterus present.      Conjunctiva/sclera: Conjunctivae normal.   Cardiovascular:      Rate and Rhythm: Normal rate and regular rhythm.      Pulses: Normal pulses.      Heart sounds: Normal heart sounds.   Pulmonary:      Effort: Pulmonary effort is normal. No respiratory distress.      Breath sounds: Normal breath sounds.   Abdominal:      General: Bowel sounds are normal. There is distension (improved after para).      Palpations: Abdomen is soft.      Tenderness: There is abdominal tenderness.   Musculoskeletal:      Right lower leg: Edema (trace pitting) present.      Left lower leg: Edema (trace pitting) present.   Skin:     General: Skin is warm and dry.      Coloration: Skin is jaundiced.      Findings: No bruising.   Neurological:      Mental Status: He is alert and oriented to person, place, and time.       Significant Labs:  CBC:   Recent Labs   Lab 11/01/22  0621   WBC 20.81*   RBC 2.37*   HGB 8.1*   HCT 23.7*   PLT 49*   *   MCH 34.2*   MCHC 34.2       CMP:   Recent Labs   Lab  11/01/22  0621   *   CALCIUM 8.6*   ALBUMIN 3.2*   PROT 5.5*   *   K 3.7   CO2 18*   CL 91*   BUN 72*   CREATININE 7.8*   ALKPHOS 179*   ALT 20   AST 46*   BILITOT 12.7*       All labs within the past 24 hours have been reviewed.     Significant Imaging:       Assessment/Plan:     * KATHE (acute kidney injury)  Patient with baseline creatinine of 1.0 which worsened to creatinine of 1.4 at the time of admission to OSH and subsequently 3.8 upon transfer to [facility].  Notably, creatinine 2.4 upon repeat so suspect 3.8 was erroneous. In the setting of cirrhosis, there was some concern for hepatorenal syndrome so patient was started on albumin and midodrine.  Urine sodium (<10) consistent with hepatorenal syndrome but patient with robust blood pressures so less likely.  Lower suspicion for abdominal compartment syndrome given recent paracentesis.  Bilirubin elevated.  Suspect multifactorial including HRS c/b ATN v bilirubin tubulopathy.     Patient with steadily worsening renal function during admission. Anuric since 10/28. Cr 7.2 on 10/31. No significant acidosis, electrolyte abnormality noted, though patient with worsening abdominal pain and distension s/o ascites. Anticipate he will need RRT in the near future. Patient discharging today 11/1 to go to [place]. He will undergo HD at that institution.     Recommendations:   - Line placement for HD tomorrow if patient does not discharge   - okay to continue HRS regimen at this time  - s/p albumin protocol for volume expansion  - strict intake and output  - maintain map > 85 if treating for HRS  - renally dose medications and avoid nephrotoxins when possible          Thank you for your consult. I will sign off. Please contact us if you have any additional questions.    Duy Hdz MD  Nephrology  [facility]

## 2022-11-01 NOTE — ASSESSMENT & PLAN NOTE
Secondary to decompensated liver disease that is not improving. Patient aware that his BP places him at risk for further decompensation and that he may not survive the trip to [place] via medical transportation. He and sister both accept the risk that he may pass en route, but he wants to be with family at this time.   - On midodrine 15mg TID  - No obvious source of ongoing infection, previously treated for abscess of axilla.   - Currently hemodynamically stable

## 2022-11-01 NOTE — PT/OT/SLP PROGRESS
Physical Therapy  Pt Not Seen    Patient Name:  Marshall Corona   MRN:  [id number]    Patient not seen today secondary to  Other (Comment) (Discharge orders in place). Will follow-up on next scheduled visit. If pt not discharged from hospital    Ni Hancock, PTA  11/1/2022

## 2022-11-01 NOTE — ASSESSMENT & PLAN NOTE
MELD-Na score: 37 at 11/1/2022  6:21 AM  MELD score: 36 at 11/1/2022  6:21 AM  Calculated from:  Serum Creatinine: 7.8 mg/dL (Using max of 4 mg/dL) at 11/1/2022  6:21 AM  Serum Sodium: 127 mmol/L at 11/1/2022  6:21 AM  Total Bilirubin: 12.7 mg/dL at 11/1/2022  6:21 AM  INR(ratio): 1.9 at 11/1/2022  6:21 AM  Age: 47 years  Not a transplant evaluation candidate given limited sobriety and lack of family in [place] in addition to alcohol use despite knowledge of liver disease. Last PETH negative however  - Continuing midodrine and octreotide for HRS while admitted, will discharge on midodrine 15mg TID  - Continuing lactulose for HE  - s/p Vitamin K x3 days  - Repeat para negative for SBP

## 2022-11-01 NOTE — ASSESSMENT & PLAN NOTE
Patient with baseline creatinine of 1.0 which worsened to creatinine of 1.4 at the time of admission to OSH and subsequently 3.8 upon transfer to [facility].  Notably, creatinine 2.4 upon repeat so suspect 3.8 was erroneous. In the setting of cirrhosis, there was some concern for hepatorenal syndrome so patient was started on albumin and midodrine.  Urine sodium (<10) consistent with hepatorenal syndrome but patient with robust blood pressures so less likely.  Lower suspicion for abdominal compartment syndrome given recent paracentesis.  Bilirubin elevated.  Suspect multifactorial including HRS c/b ATN v bilirubin tubulopathy.     Patient with steadily worsening renal function during admission. Anuric since 10/28. Cr 7.2 on 10/31. No significant acidosis, electrolyte abnormality noted, though patient with worsening abdominal pain and distension s/o ascites. Anticipate he will need RRT in the near future. Patient discharging today 11/1 to go to [place]. He will undergo HD at that institution.     Recommendations:   - Line placement for HD tomorrow if patient does not discharge   - okay to continue HRS regimen at this time  - s/p albumin protocol for volume expansion  - strict intake and output  - maintain map > 85 if treating for HRS  - renally dose medications and avoid nephrotoxins when possible

## 2022-11-02 NOTE — PLAN OF CARE
[facility] - Transplant Stepdown  Discharge Final Note    Primary Care Provider: Primary Doctor No    Expected Discharge Date: 11/1/2022    Final Discharge Note (most recent)       Final Note - 11/02/22 0839          Final Note    Assessment Type Final Discharge Note     Anticipated Discharge Disposition Home or Self Care        Post-Acute Status    Post-Acute Authorization Other     Other Status No Post-Acute Service Needs                     Important Message from Medicare Kinyada Foots, LMSW  Case Management [facility]  Ext: [phone]

## 2022-11-02 NOTE — NURSING
"Transport arrived at this time to take pt back home to [place] to be with family.  Pt vitals were stable prior to transport ex BP 83/31.  Patient verbalized that he is not interested in going to ICU for his low BP and that he is ready to get back home to his family.  Pt is AAO4 and states he feels "great".  I removed his PIV.  Pt left with discharge paperwork and personal belongings.   "

## 2022-11-03 LAB — BACTERIA SPEC AEROBE CULT: NO GROWTH

## 2022-11-07 LAB — BACTERIA SPEC ANAEROBE CULT: NORMAL

## 2023-01-23 PROBLEM — N17.9 AKI (ACUTE KIDNEY INJURY): Status: RESOLVED | Noted: 2022-10-20 | Resolved: 2023-01-23
